# Patient Record
Sex: FEMALE | Race: BLACK OR AFRICAN AMERICAN | NOT HISPANIC OR LATINO
[De-identification: names, ages, dates, MRNs, and addresses within clinical notes are randomized per-mention and may not be internally consistent; named-entity substitution may affect disease eponyms.]

---

## 2017-08-30 ENCOUNTER — OTHER (OUTPATIENT)
Age: 77
End: 2017-08-30

## 2017-08-30 DIAGNOSIS — M25.561 PAIN IN RIGHT KNEE: ICD-10-CM

## 2017-08-30 DIAGNOSIS — M25.562 PAIN IN RIGHT KNEE: ICD-10-CM

## 2017-09-07 ENCOUNTER — APPOINTMENT (OUTPATIENT)
Dept: ORTHOPEDIC SURGERY | Facility: CLINIC | Age: 77
End: 2017-09-07
Payer: MEDICARE

## 2017-09-07 VITALS
SYSTOLIC BLOOD PRESSURE: 177 MMHG | DIASTOLIC BLOOD PRESSURE: 92 MMHG | WEIGHT: 146.5 LBS | HEART RATE: 93 BPM | HEIGHT: 61.5 IN | BODY MASS INDEX: 27.31 KG/M2

## 2017-09-07 DIAGNOSIS — Z86.39 PERSONAL HISTORY OF OTHER ENDOCRINE, NUTRITIONAL AND METABOLIC DISEASE: ICD-10-CM

## 2017-09-07 DIAGNOSIS — M17.12 UNILATERAL PRIMARY OSTEOARTHRITIS, LEFT KNEE: ICD-10-CM

## 2017-09-07 DIAGNOSIS — M17.11 UNILATERAL PRIMARY OSTEOARTHRITIS, RIGHT KNEE: ICD-10-CM

## 2017-09-07 DIAGNOSIS — Z86.79 PERSONAL HISTORY OF OTHER DISEASES OF THE CIRCULATORY SYSTEM: ICD-10-CM

## 2017-09-07 DIAGNOSIS — Z87.39 PERSONAL HISTORY OF OTHER DISEASES OF THE MUSCULOSKELETAL SYSTEM AND CONNECTIVE TISSUE: ICD-10-CM

## 2017-09-07 DIAGNOSIS — Z82.61 FAMILY HISTORY OF ARTHRITIS: ICD-10-CM

## 2017-09-07 DIAGNOSIS — Z80.8 FAMILY HISTORY OF MALIGNANT NEOPLASM OF OTHER ORGANS OR SYSTEMS: ICD-10-CM

## 2017-09-07 DIAGNOSIS — Z78.9 OTHER SPECIFIED HEALTH STATUS: ICD-10-CM

## 2017-09-07 DIAGNOSIS — Z87.09 PERSONAL HISTORY OF OTHER DISEASES OF THE RESPIRATORY SYSTEM: ICD-10-CM

## 2017-09-07 PROCEDURE — 73564 X-RAY EXAM KNEE 4 OR MORE: CPT | Mod: LT

## 2017-09-07 PROCEDURE — 99205 OFFICE O/P NEW HI 60 MIN: CPT

## 2017-09-07 RX ORDER — MECLIZINE HYDROCHLORIDE 12.5 MG/1
12.5 TABLET ORAL
Refills: 0 | Status: ACTIVE | COMMUNITY

## 2017-09-07 RX ORDER — FLUTICASONE PROPIONATE AND SALMETEROL 500; 50 UG/1; UG/1
POWDER RESPIRATORY (INHALATION)
Refills: 0 | Status: ACTIVE | COMMUNITY

## 2017-09-07 RX ORDER — FEXOFENADINE HYDROCHLORIDE 180 MG/1
180 TABLET ORAL
Refills: 0 | Status: ACTIVE | COMMUNITY

## 2017-09-07 RX ORDER — ALBUTEROL 90 MCG
AEROSOL (GRAM) INHALATION
Refills: 0 | Status: ACTIVE | COMMUNITY

## 2017-09-07 RX ORDER — MULTIVIT-MIN/FA/LYCOPEN/LUTEIN .4-300-25
TABLET ORAL
Refills: 0 | Status: ACTIVE | COMMUNITY

## 2017-09-07 RX ORDER — LOSARTAN POTASSIUM 100 MG/1
100 TABLET, FILM COATED ORAL
Refills: 0 | Status: ACTIVE | COMMUNITY

## 2017-09-17 ENCOUNTER — TRANSCRIPTION ENCOUNTER (OUTPATIENT)
Age: 77
End: 2017-09-17

## 2017-09-21 ENCOUNTER — OUTPATIENT (OUTPATIENT)
Dept: OUTPATIENT SERVICES | Facility: HOSPITAL | Age: 77
LOS: 1 days | End: 2017-09-21
Payer: COMMERCIAL

## 2017-09-21 VITALS
HEIGHT: 61.5 IN | WEIGHT: 149.91 LBS | SYSTOLIC BLOOD PRESSURE: 156 MMHG | DIASTOLIC BLOOD PRESSURE: 81 MMHG | TEMPERATURE: 98 F | HEART RATE: 71 BPM | RESPIRATION RATE: 16 BRPM

## 2017-09-21 DIAGNOSIS — I10 ESSENTIAL (PRIMARY) HYPERTENSION: ICD-10-CM

## 2017-09-21 DIAGNOSIS — M17.12 UNILATERAL PRIMARY OSTEOARTHRITIS, LEFT KNEE: ICD-10-CM

## 2017-09-21 DIAGNOSIS — Z90.89 ACQUIRED ABSENCE OF OTHER ORGANS: Chronic | ICD-10-CM

## 2017-09-21 DIAGNOSIS — Z01.818 ENCOUNTER FOR OTHER PREPROCEDURAL EXAMINATION: ICD-10-CM

## 2017-09-21 DIAGNOSIS — Z90.710 ACQUIRED ABSENCE OF BOTH CERVIX AND UTERUS: Chronic | ICD-10-CM

## 2017-09-21 DIAGNOSIS — J45.909 UNSPECIFIED ASTHMA, UNCOMPLICATED: ICD-10-CM

## 2017-09-21 DIAGNOSIS — E11.9 TYPE 2 DIABETES MELLITUS WITHOUT COMPLICATIONS: ICD-10-CM

## 2017-09-21 DIAGNOSIS — Z98.41 CATARACT EXTRACTION STATUS, RIGHT EYE: Chronic | ICD-10-CM

## 2017-09-21 LAB
ANION GAP SERPL CALC-SCNC: 15 MMOL/L — SIGNIFICANT CHANGE UP (ref 5–17)
APTT BLD: 36.5 SEC — SIGNIFICANT CHANGE UP (ref 27.5–37.4)
BASOPHILS # BLD AUTO: 0 K/UL — SIGNIFICANT CHANGE UP (ref 0–0.2)
BASOPHILS NFR BLD AUTO: 0.1 % — SIGNIFICANT CHANGE UP (ref 0–2)
BLD GP AB SCN SERPL QL: SIGNIFICANT CHANGE UP
BUN SERPL-MCNC: 8 MG/DL — SIGNIFICANT CHANGE UP (ref 8–20)
CALCIUM SERPL-MCNC: 9.9 MG/DL — SIGNIFICANT CHANGE UP (ref 8.6–10.2)
CHLORIDE SERPL-SCNC: 100 MMOL/L — SIGNIFICANT CHANGE UP (ref 98–107)
CO2 SERPL-SCNC: 26 MMOL/L — SIGNIFICANT CHANGE UP (ref 22–29)
CREAT SERPL-MCNC: 0.49 MG/DL — LOW (ref 0.5–1.3)
EOSINOPHIL # BLD AUTO: 0.3 K/UL — SIGNIFICANT CHANGE UP (ref 0–0.5)
EOSINOPHIL NFR BLD AUTO: 4.7 % — SIGNIFICANT CHANGE UP (ref 0–6)
GLUCOSE SERPL-MCNC: 80 MG/DL — SIGNIFICANT CHANGE UP (ref 70–115)
HBA1C BLD-MCNC: 5.5 % — SIGNIFICANT CHANGE UP (ref 4–5.6)
HCT VFR BLD CALC: 38.3 % — SIGNIFICANT CHANGE UP (ref 37–47)
HGB BLD-MCNC: 13.1 G/DL — SIGNIFICANT CHANGE UP (ref 12–16)
INR BLD: 0.99 RATIO — SIGNIFICANT CHANGE UP (ref 0.88–1.16)
LYMPHOCYTES # BLD AUTO: 2.3 K/UL — SIGNIFICANT CHANGE UP (ref 1–4.8)
LYMPHOCYTES # BLD AUTO: 32.3 % — SIGNIFICANT CHANGE UP (ref 20–55)
MCHC RBC-ENTMCNC: 32.2 PG — HIGH (ref 27–31)
MCHC RBC-ENTMCNC: 34.2 G/DL — SIGNIFICANT CHANGE UP (ref 32–36)
MCV RBC AUTO: 94.1 FL — SIGNIFICANT CHANGE UP (ref 81–99)
MONOCYTES # BLD AUTO: 0.6 K/UL — SIGNIFICANT CHANGE UP (ref 0–0.8)
MONOCYTES NFR BLD AUTO: 8.9 % — SIGNIFICANT CHANGE UP (ref 3–10)
MRSA PCR RESULT.: SIGNIFICANT CHANGE UP
NEUTROPHILS # BLD AUTO: 3.9 K/UL — SIGNIFICANT CHANGE UP (ref 1.8–8)
NEUTROPHILS NFR BLD AUTO: 53.9 % — SIGNIFICANT CHANGE UP (ref 37–73)
PLATELET # BLD AUTO: 390 K/UL — SIGNIFICANT CHANGE UP (ref 150–400)
POTASSIUM SERPL-MCNC: 3.6 MMOL/L — SIGNIFICANT CHANGE UP (ref 3.5–5.3)
POTASSIUM SERPL-SCNC: 3.6 MMOL/L — SIGNIFICANT CHANGE UP (ref 3.5–5.3)
PROTHROM AB SERPL-ACNC: 10.9 SEC — SIGNIFICANT CHANGE UP (ref 9.8–12.7)
RBC # BLD: 4.07 M/UL — LOW (ref 4.4–5.2)
RBC # FLD: 14.6 % — SIGNIFICANT CHANGE UP (ref 11–15.6)
S AUREUS DNA NOSE QL NAA+PROBE: SIGNIFICANT CHANGE UP
SODIUM SERPL-SCNC: 141 MMOL/L — SIGNIFICANT CHANGE UP (ref 135–145)
TYPE + AB SCN PNL BLD: SIGNIFICANT CHANGE UP
WBC # BLD: 7.2 K/UL — SIGNIFICANT CHANGE UP (ref 4.8–10.8)
WBC # FLD AUTO: 7.2 K/UL — SIGNIFICANT CHANGE UP (ref 4.8–10.8)

## 2017-09-21 PROCEDURE — 87640 STAPH A DNA AMP PROBE: CPT

## 2017-09-21 PROCEDURE — 86900 BLOOD TYPING SEROLOGIC ABO: CPT

## 2017-09-21 PROCEDURE — 83036 HEMOGLOBIN GLYCOSYLATED A1C: CPT

## 2017-09-21 PROCEDURE — 80048 BASIC METABOLIC PNL TOTAL CA: CPT

## 2017-09-21 PROCEDURE — 87641 MR-STAPH DNA AMP PROBE: CPT

## 2017-09-21 PROCEDURE — 86901 BLOOD TYPING SEROLOGIC RH(D): CPT

## 2017-09-21 PROCEDURE — 36415 COLL VENOUS BLD VENIPUNCTURE: CPT

## 2017-09-21 PROCEDURE — 85027 COMPLETE CBC AUTOMATED: CPT

## 2017-09-21 PROCEDURE — 85730 THROMBOPLASTIN TIME PARTIAL: CPT

## 2017-09-21 PROCEDURE — 86850 RBC ANTIBODY SCREEN: CPT

## 2017-09-21 PROCEDURE — 93005 ELECTROCARDIOGRAM TRACING: CPT

## 2017-09-21 PROCEDURE — 85610 PROTHROMBIN TIME: CPT

## 2017-09-21 PROCEDURE — 93010 ELECTROCARDIOGRAM REPORT: CPT

## 2017-09-21 PROCEDURE — G0463: CPT

## 2017-09-21 RX ORDER — GABAPENTIN 400 MG/1
1 CAPSULE ORAL
Qty: 0 | Refills: 0 | COMMUNITY

## 2017-09-21 RX ORDER — METFORMIN HYDROCHLORIDE 850 MG/1
1 TABLET ORAL
Qty: 0 | Refills: 0 | COMMUNITY

## 2017-09-21 RX ORDER — SODIUM CHLORIDE 9 MG/ML
3 INJECTION INTRAMUSCULAR; INTRAVENOUS; SUBCUTANEOUS EVERY 8 HOURS
Qty: 0 | Refills: 0 | Status: DISCONTINUED | OUTPATIENT
Start: 2017-10-06 | End: 2017-10-07

## 2017-09-21 NOTE — PATIENT PROFILE ADULT. - FAMILY HISTORY
Mother  Still living? No  Family history of bone cancer, Age at diagnosis: 71-80     Father  Still living? No  Family history of prostate cancer, Age at diagnosis:   Family history of dementia, Age at diagnosis: Age Unknown     Sibling  Still living? No  Family history of AIDS, Age at diagnosis: 21-30     Child  Still living? Yes, Estimated age: Age Unknown  Family history of cerebral palsy, Age at diagnosis: Age Unknown

## 2017-09-21 NOTE — H&P PST ADULT - ASSESSMENT
77F PMH HTN, Asthma, DM, Colon Polyps, Neuropathy, Fibromyalgia, GERD, Glaucoma and Anemia with left knee osteoarthritis for Left Knee Total Joint Replacement.

## 2017-09-21 NOTE — PATIENT PROFILE ADULT. - LEARNING ASSESSMENT (PATIENT) ADDITIONAL COMMENTS
Educated on pain scale and management.  Tips for safer surgery  and pre op instructions given.  Verbalized understanding.

## 2017-09-21 NOTE — H&P PST ADULT - PMH
Anemia    Asthma    Colon polyps    Diabetes mellitus    Fibromyalgia    GERD (gastroesophageal reflux disease)    Glaucoma    Hypertension    Neuropathy    Osteoarthritis

## 2017-09-21 NOTE — H&P PST ADULT - NSANTHOSAYNRD_GEN_A_CORE
No. NALLELY screening performed.  STOP BANG Legend: 0-2 = LOW Risk; 3-4 = INTERMEDIATE Risk; 5-8 = HIGH Risk

## 2017-09-21 NOTE — H&P PST ADULT - PSH
History of bilateral cataract extraction    History of hysterectomy    S/P tonsillectomy and adenoidectomy

## 2017-09-26 RX ORDER — GABAPENTIN 400 MG/1
300 CAPSULE ORAL ONCE
Qty: 0 | Refills: 0 | Status: COMPLETED | OUTPATIENT
Start: 2017-10-06 | End: 2017-10-06

## 2017-09-26 RX ORDER — OXYCODONE HYDROCHLORIDE 5 MG/1
10 TABLET ORAL ONCE
Qty: 0 | Refills: 0 | Status: DISCONTINUED | OUTPATIENT
Start: 2017-10-06 | End: 2017-10-06

## 2017-09-26 RX ORDER — CELECOXIB 200 MG/1
400 CAPSULE ORAL ONCE
Qty: 0 | Refills: 0 | Status: COMPLETED | OUTPATIENT
Start: 2017-10-06 | End: 2017-10-06

## 2017-09-26 RX ORDER — SCOPALAMINE 1 MG/3D
1.5 PATCH, EXTENDED RELEASE TRANSDERMAL ONCE
Qty: 0 | Refills: 0 | Status: COMPLETED | OUTPATIENT
Start: 2017-10-06 | End: 2017-10-06

## 2017-10-05 ENCOUNTER — FORM ENCOUNTER (OUTPATIENT)
Age: 77
End: 2017-10-05

## 2017-10-06 ENCOUNTER — RESULT REVIEW (OUTPATIENT)
Age: 77
End: 2017-10-06

## 2017-10-06 ENCOUNTER — APPOINTMENT (OUTPATIENT)
Dept: ORTHOPEDIC SURGERY | Facility: HOSPITAL | Age: 77
End: 2017-10-06

## 2017-10-06 ENCOUNTER — TRANSCRIPTION ENCOUNTER (OUTPATIENT)
Age: 77
End: 2017-10-06

## 2017-10-06 ENCOUNTER — INPATIENT (INPATIENT)
Facility: HOSPITAL | Age: 77
LOS: 0 days | Discharge: HOME | DRG: 470 | End: 2017-10-07
Attending: ORTHOPAEDIC SURGERY | Admitting: ORTHOPAEDIC SURGERY
Payer: COMMERCIAL

## 2017-10-06 VITALS
OXYGEN SATURATION: 97 % | WEIGHT: 149.91 LBS | SYSTOLIC BLOOD PRESSURE: 141 MMHG | DIASTOLIC BLOOD PRESSURE: 73 MMHG | HEART RATE: 74 BPM | HEIGHT: 61.5 IN | RESPIRATION RATE: 14 BRPM | TEMPERATURE: 98 F

## 2017-10-06 DIAGNOSIS — Z90.89 ACQUIRED ABSENCE OF OTHER ORGANS: Chronic | ICD-10-CM

## 2017-10-06 DIAGNOSIS — M17.12 UNILATERAL PRIMARY OSTEOARTHRITIS, LEFT KNEE: ICD-10-CM

## 2017-10-06 DIAGNOSIS — Z90.710 ACQUIRED ABSENCE OF BOTH CERVIX AND UTERUS: Chronic | ICD-10-CM

## 2017-10-06 DIAGNOSIS — Z98.41 CATARACT EXTRACTION STATUS, RIGHT EYE: Chronic | ICD-10-CM

## 2017-10-06 LAB — ABO RH CONFIRMATION: SIGNIFICANT CHANGE UP

## 2017-10-06 PROCEDURE — 88305 TISSUE EXAM BY PATHOLOGIST: CPT | Mod: 26

## 2017-10-06 PROCEDURE — 88311 DECALCIFY TISSUE: CPT | Mod: 26

## 2017-10-06 PROCEDURE — 73560 X-RAY EXAM OF KNEE 1 OR 2: CPT | Mod: 26,LT

## 2017-10-06 PROCEDURE — 20985 CPTR-ASST DIR MS PX: CPT

## 2017-10-06 PROCEDURE — 20985 CPTR-ASST DIR MS PX: CPT | Mod: AS

## 2017-10-06 PROCEDURE — 27447 TOTAL KNEE ARTHROPLASTY: CPT | Mod: AS,LT

## 2017-10-06 PROCEDURE — 27447 TOTAL KNEE ARTHROPLASTY: CPT | Mod: LT

## 2017-10-06 RX ORDER — THEOPHYLLINE ANHYDROUS 200 MG
300 TABLET, EXTENDED RELEASE 12 HR ORAL EVERY 12 HOURS
Qty: 0 | Refills: 0 | Status: DISCONTINUED | OUTPATIENT
Start: 2017-10-06 | End: 2017-10-07

## 2017-10-06 RX ORDER — DEXTROSE 50 % IN WATER 50 %
15 SYRINGE (ML) INTRAVENOUS ONCE
Qty: 0 | Refills: 0 | Status: COMPLETED | OUTPATIENT
Start: 2017-10-06 | End: 2017-10-06

## 2017-10-06 RX ORDER — DEXTROSE 50 % IN WATER 50 %
25 SYRINGE (ML) INTRAVENOUS ONCE
Qty: 0 | Refills: 0 | Status: DISCONTINUED | OUTPATIENT
Start: 2017-10-06 | End: 2017-10-07

## 2017-10-06 RX ORDER — CEFAZOLIN SODIUM 1 G
2000 VIAL (EA) INJECTION ONCE
Qty: 0 | Refills: 0 | Status: DISCONTINUED | OUTPATIENT
Start: 2017-10-06 | End: 2017-10-06

## 2017-10-06 RX ORDER — KETOROLAC TROMETHAMINE 30 MG/ML
15 SYRINGE (ML) INJECTION EVERY 6 HOURS
Qty: 0 | Refills: 0 | Status: DISCONTINUED | OUTPATIENT
Start: 2017-10-06 | End: 2017-10-07

## 2017-10-06 RX ORDER — SODIUM CHLORIDE 9 MG/ML
1000 INJECTION, SOLUTION INTRAVENOUS
Qty: 0 | Refills: 0 | Status: DISCONTINUED | OUTPATIENT
Start: 2017-10-06 | End: 2017-10-07

## 2017-10-06 RX ORDER — LOSARTAN POTASSIUM 100 MG/1
100 TABLET, FILM COATED ORAL DAILY
Qty: 0 | Refills: 0 | Status: DISCONTINUED | OUTPATIENT
Start: 2017-10-07 | End: 2017-10-07

## 2017-10-06 RX ORDER — LORATADINE 10 MG/1
10 TABLET ORAL DAILY
Qty: 0 | Refills: 0 | Status: DISCONTINUED | OUTPATIENT
Start: 2017-10-06 | End: 2017-10-07

## 2017-10-06 RX ORDER — OXYCODONE HYDROCHLORIDE 5 MG/1
5 TABLET ORAL
Qty: 0 | Refills: 0 | Status: DISCONTINUED | OUTPATIENT
Start: 2017-10-06 | End: 2017-10-07

## 2017-10-06 RX ORDER — TRANEXAMIC ACID 100 MG/ML
700 INJECTION, SOLUTION INTRAVENOUS ONCE
Qty: 0 | Refills: 0 | Status: DISCONTINUED | OUTPATIENT
Start: 2017-10-06 | End: 2017-10-06

## 2017-10-06 RX ORDER — DEXTROSE 50 % IN WATER 50 %
SYRINGE (ML) INTRAVENOUS
Qty: 0 | Refills: 0 | Status: DISCONTINUED | OUTPATIENT
Start: 2017-10-06 | End: 2017-10-06

## 2017-10-06 RX ORDER — HYDROMORPHONE HYDROCHLORIDE 2 MG/ML
0.5 INJECTION INTRAMUSCULAR; INTRAVENOUS; SUBCUTANEOUS EVERY 4 HOURS
Qty: 0 | Refills: 0 | Status: DISCONTINUED | OUTPATIENT
Start: 2017-10-06 | End: 2017-10-07

## 2017-10-06 RX ORDER — ACETAMINOPHEN 500 MG
1000 TABLET ORAL ONCE
Qty: 0 | Refills: 0 | Status: DISCONTINUED | OUTPATIENT
Start: 2017-10-06 | End: 2017-10-06

## 2017-10-06 RX ORDER — VANCOMYCIN HCL 1 G
1000 VIAL (EA) INTRAVENOUS ONCE
Qty: 0 | Refills: 0 | Status: COMPLETED | OUTPATIENT
Start: 2017-10-06 | End: 2017-10-06

## 2017-10-06 RX ORDER — GLUCAGON INJECTION, SOLUTION 0.5 MG/.1ML
1 INJECTION, SOLUTION SUBCUTANEOUS ONCE
Qty: 0 | Refills: 0 | Status: DISCONTINUED | OUTPATIENT
Start: 2017-10-06 | End: 2017-10-07

## 2017-10-06 RX ORDER — VANCOMYCIN HCL 1 G
1000 VIAL (EA) INTRAVENOUS
Qty: 0 | Refills: 0 | Status: COMPLETED | OUTPATIENT
Start: 2017-10-06 | End: 2017-10-06

## 2017-10-06 RX ORDER — DEXTROSE 50 % IN WATER 50 %
10 SYRINGE (ML) INTRAVENOUS ONCE
Qty: 0 | Refills: 0 | Status: DISCONTINUED | OUTPATIENT
Start: 2017-10-06 | End: 2017-10-06

## 2017-10-06 RX ORDER — SODIUM CHLORIDE 9 MG/ML
1000 INJECTION INTRAMUSCULAR; INTRAVENOUS; SUBCUTANEOUS
Qty: 0 | Refills: 0 | Status: DISCONTINUED | OUTPATIENT
Start: 2017-10-06 | End: 2017-10-06

## 2017-10-06 RX ORDER — ALBUTEROL 90 UG/1
2 AEROSOL, METERED ORAL EVERY 6 HOURS
Qty: 0 | Refills: 0 | Status: DISCONTINUED | OUTPATIENT
Start: 2017-10-06 | End: 2017-10-07

## 2017-10-06 RX ORDER — DEXTROSE 50 % IN WATER 50 %
1 SYRINGE (ML) INTRAVENOUS ONCE
Qty: 0 | Refills: 0 | Status: DISCONTINUED | OUTPATIENT
Start: 2017-10-06 | End: 2017-10-07

## 2017-10-06 RX ORDER — SENNA PLUS 8.6 MG/1
2 TABLET ORAL AT BEDTIME
Qty: 0 | Refills: 0 | Status: DISCONTINUED | OUTPATIENT
Start: 2017-10-06 | End: 2017-10-07

## 2017-10-06 RX ORDER — DOCUSATE SODIUM 100 MG
100 CAPSULE ORAL THREE TIMES A DAY
Qty: 0 | Refills: 0 | Status: DISCONTINUED | OUTPATIENT
Start: 2017-10-06 | End: 2017-10-07

## 2017-10-06 RX ORDER — DEXTROSE 50 % IN WATER 50 %
12.5 SYRINGE (ML) INTRAVENOUS ONCE
Qty: 0 | Refills: 0 | Status: DISCONTINUED | OUTPATIENT
Start: 2017-10-06 | End: 2017-10-07

## 2017-10-06 RX ORDER — MAGNESIUM HYDROXIDE 400 MG/1
30 TABLET, CHEWABLE ORAL DAILY
Qty: 0 | Refills: 0 | Status: DISCONTINUED | OUTPATIENT
Start: 2017-10-06 | End: 2017-10-07

## 2017-10-06 RX ORDER — ACETAMINOPHEN 500 MG
975 TABLET ORAL EVERY 8 HOURS
Qty: 0 | Refills: 0 | Status: DISCONTINUED | OUTPATIENT
Start: 2017-10-06 | End: 2017-10-07

## 2017-10-06 RX ORDER — ONDANSETRON 8 MG/1
4 TABLET, FILM COATED ORAL EVERY 6 HOURS
Qty: 0 | Refills: 0 | Status: DISCONTINUED | OUTPATIENT
Start: 2017-10-06 | End: 2017-10-07

## 2017-10-06 RX ORDER — MECLIZINE HCL 12.5 MG
12.5 TABLET ORAL THREE TIMES A DAY
Qty: 0 | Refills: 0 | Status: DISCONTINUED | OUTPATIENT
Start: 2017-10-06 | End: 2017-10-07

## 2017-10-06 RX ORDER — CEFAZOLIN SODIUM 1 G
2000 VIAL (EA) INJECTION
Qty: 0 | Refills: 0 | Status: COMPLETED | OUTPATIENT
Start: 2017-10-06 | End: 2017-10-07

## 2017-10-06 RX ORDER — HYDROMORPHONE HYDROCHLORIDE 2 MG/ML
0.5 INJECTION INTRAMUSCULAR; INTRAVENOUS; SUBCUTANEOUS
Qty: 0 | Refills: 0 | Status: DISCONTINUED | OUTPATIENT
Start: 2017-10-06 | End: 2017-10-06

## 2017-10-06 RX ORDER — ENOXAPARIN SODIUM 100 MG/ML
30 INJECTION SUBCUTANEOUS EVERY 12 HOURS
Qty: 0 | Refills: 0 | Status: DISCONTINUED | OUTPATIENT
Start: 2017-10-07 | End: 2017-10-07

## 2017-10-06 RX ORDER — ACETAMINOPHEN 500 MG
650 TABLET ORAL EVERY 6 HOURS
Qty: 0 | Refills: 0 | Status: DISCONTINUED | OUTPATIENT
Start: 2017-10-06 | End: 2017-10-07

## 2017-10-06 RX ORDER — INSULIN LISPRO 100/ML
VIAL (ML) SUBCUTANEOUS
Qty: 0 | Refills: 0 | Status: DISCONTINUED | OUTPATIENT
Start: 2017-10-06 | End: 2017-10-07

## 2017-10-06 RX ORDER — OXYCODONE HYDROCHLORIDE 5 MG/1
10 TABLET ORAL EVERY 12 HOURS
Qty: 0 | Refills: 0 | Status: DISCONTINUED | OUTPATIENT
Start: 2017-10-06 | End: 2017-10-07

## 2017-10-06 RX ORDER — POLYETHYLENE GLYCOL 3350 17 G/17G
17 POWDER, FOR SOLUTION ORAL DAILY
Qty: 0 | Refills: 0 | Status: DISCONTINUED | OUTPATIENT
Start: 2017-10-06 | End: 2017-10-07

## 2017-10-06 RX ORDER — OXYCODONE HYDROCHLORIDE 5 MG/1
10 TABLET ORAL
Qty: 0 | Refills: 0 | Status: DISCONTINUED | OUTPATIENT
Start: 2017-10-06 | End: 2017-10-07

## 2017-10-06 RX ORDER — BUDESONIDE AND FORMOTEROL FUMARATE DIHYDRATE 160; 4.5 UG/1; UG/1
2 AEROSOL RESPIRATORY (INHALATION)
Qty: 0 | Refills: 0 | Status: DISCONTINUED | OUTPATIENT
Start: 2017-10-06 | End: 2017-10-07

## 2017-10-06 RX ORDER — ONDANSETRON 8 MG/1
4 TABLET, FILM COATED ORAL ONCE
Qty: 0 | Refills: 0 | Status: DISCONTINUED | OUTPATIENT
Start: 2017-10-06 | End: 2017-10-06

## 2017-10-06 RX ADMIN — SODIUM CHLORIDE 100 MILLILITER(S): 9 INJECTION, SOLUTION INTRAVENOUS at 22:03

## 2017-10-06 RX ADMIN — GABAPENTIN 300 MILLIGRAM(S): 400 CAPSULE ORAL at 11:27

## 2017-10-06 RX ADMIN — SCOPALAMINE 1.5 MILLIGRAM(S): 1 PATCH, EXTENDED RELEASE TRANSDERMAL at 11:29

## 2017-10-06 RX ADMIN — OXYCODONE HYDROCHLORIDE 10 MILLIGRAM(S): 5 TABLET ORAL at 11:27

## 2017-10-06 RX ADMIN — Medication 15 MILLIGRAM(S): at 23:51

## 2017-10-06 RX ADMIN — ALBUTEROL 2 PUFF(S): 90 AEROSOL, METERED ORAL at 18:47

## 2017-10-06 RX ADMIN — Medication 250 MILLIGRAM(S): at 11:28

## 2017-10-06 RX ADMIN — Medication 250 MILLIGRAM(S): at 23:50

## 2017-10-06 RX ADMIN — BUDESONIDE AND FORMOTEROL FUMARATE DIHYDRATE 2 PUFF(S): 160; 4.5 AEROSOL RESPIRATORY (INHALATION) at 20:07

## 2017-10-06 RX ADMIN — Medication 15 MILLILITER(S): at 14:08

## 2017-10-06 RX ADMIN — Medication 100 MILLIGRAM(S): at 22:03

## 2017-10-06 RX ADMIN — CELECOXIB 400 MILLIGRAM(S): 200 CAPSULE ORAL at 11:27

## 2017-10-06 RX ADMIN — SODIUM CHLORIDE 3 MILLILITER(S): 9 INJECTION INTRAMUSCULAR; INTRAVENOUS; SUBCUTANEOUS at 22:02

## 2017-10-06 NOTE — BRIEF OPERATIVE NOTE - PROCEDURE
<<-----Click on this checkbox to enter Procedure Total knee arthroplasty  10/06/2017  Left computer assisted TKR  Active  ALL

## 2017-10-06 NOTE — DISCHARGE NOTE ADULT - CARE PLAN
Goal:	PT  Instructions for follow-up, activity and diet:	The patient will be seen in the office in 2 weeks for wound check. Tape will be removed at that time. Patient may shower after post-op day #5. The dressing is to be removed on 7. IF THE DRESSING BECOMES SOILED BEFORE THE REMOVAL DATE, CHANGE WITH A SIMILAR DRESSING. IF THE DRESSING BECOMES STAINED WITH DISCHARGE, CONTACT THE OFFICE FOR FURTHER DIRECTIONS. The patient will contact the office if the wound becomes red, has increasing pain, develops bleeding or discharge, an injury occurs, or has other concerns. The patient will continue PT consistent with total knee replacement. The patient will continue LOVENOX for 2 weeks and then begin ASPIRIN 325mg TWICE daily for 4 weeks for blood clot prevention. +++ The patient will take OXYCODONE AND TYLENOL for pain control and titrate according to prescription and patient needs. The patient will take Colace while taking oxycodone to prevent narcotic associated constipation.  Additionally, increase water intake (drink at least 8 glasses of water daily) and try adding fiber to the diet by eating fruits, vegetables and foods that are rich in grains. If constipation is experienced, contact the medical/primary care provider to discuss further treatment options. The patient is FULL weight bearing. Elevation of the lower leg is recommended to reduce swelling. Principal Discharge DX:	Primary osteoarthritis of left knee  Goal:	PT  Instructions for follow-up, activity and diet:	The patient will be seen in the office in 2 weeks for wound check. Patient may shower after post-op day #3. The dressing is to be removed on 7. IF THE DRESSING BECOMES SOILED BEFORE THE REMOVAL DATE, CHANGE WITH A SIMILAR DRESSING. IF THE DRESSING BECOMES STAINED WITH DISCHARGE, CONTACT THE OFFICE FOR FURTHER DIRECTIONS. The patient will contact the office if the wound becomes red, has increasing pain, develops bleeding or discharge, an injury occurs, or has other concerns. The patient will continue PT consistent with total knee replacement. The patient will continue LOVENOX for 4 weeks for blood clot prevention. +++ The patient will take OXYCODONE AND TYLENOL for pain control and titrate according to prescription and patient needs. The patient will take Colace while taking oxycodone to prevent narcotic associated constipation.  Additionally, increase water intake (drink at least 8 glasses of water daily) and try adding fiber to the diet by eating fruits, vegetables and foods that are rich in grains. If constipation is experienced, contact the medical/primary care provider to discuss further treatment options. The patient is FULL weight bearing. Elevation of the lower leg is recommended to reduce swelling.    ***All prescriptions have been sent to [facility] in [place] on [address]

## 2017-10-06 NOTE — DISCHARGE NOTE ADULT - HOSPITAL COURSE
The patient underwent a Left TOTAL KNEE REPLACEMENT on 10/6/17. The patient received antibiotics consistent with SCIP guidelines. The patient underwent the procedure and had no intra-operative complications. Post-operatively, the patient was seen by medicine and PT. The patient received LOVENOX for DVTP. The patient received pain medications per orthopedic pain management protocol and the pain was appropriately controlled. The patient did not have any post-operative medical complications. The patient was discharged in stable condition.

## 2017-10-06 NOTE — DISCHARGE NOTE ADULT - ADDITIONAL INSTRUCTIONS
For Constipation :   • Increase your water intake. Drink at least 8 glasses of water daily.  • Try adding fiber to your diet by eating fruits, vegetables and foods that are rich in grains.  • If you do experience constipation, you may take an over-the-counter stool softener/laxative such as Suzanne Colace, [place] or  Milk of Magnesia.

## 2017-10-06 NOTE — DISCHARGE NOTE ADULT - PLAN OF CARE
PT The patient will be seen in the office in 2 weeks for wound check. Tape will be removed at that time. Patient may shower after post-op day #5. The dressing is to be removed on 7. IF THE DRESSING BECOMES SOILED BEFORE THE REMOVAL DATE, CHANGE WITH A SIMILAR DRESSING. IF THE DRESSING BECOMES STAINED WITH DISCHARGE, CONTACT THE OFFICE FOR FURTHER DIRECTIONS. The patient will contact the office if the wound becomes red, has increasing pain, develops bleeding or discharge, an injury occurs, or has other concerns. The patient will continue PT consistent with total knee replacement. The patient will continue LOVENOX for 2 weeks and then begin ASPIRIN 325mg TWICE daily for 4 weeks for blood clot prevention. +++ The patient will take OXYCODONE AND TYLENOL for pain control and titrate according to prescription and patient needs. The patient will take Colace while taking oxycodone to prevent narcotic associated constipation.  Additionally, increase water intake (drink at least 8 glasses of water daily) and try adding fiber to the diet by eating fruits, vegetables and foods that are rich in grains. If constipation is experienced, contact the medical/primary care provider to discuss further treatment options. The patient is FULL weight bearing. Elevation of the lower leg is recommended to reduce swelling. The patient will be seen in the office in 2 weeks for wound check. Patient may shower after post-op day #3. The dressing is to be removed on 7. IF THE DRESSING BECOMES SOILED BEFORE THE REMOVAL DATE, CHANGE WITH A SIMILAR DRESSING. IF THE DRESSING BECOMES STAINED WITH DISCHARGE, CONTACT THE OFFICE FOR FURTHER DIRECTIONS. The patient will contact the office if the wound becomes red, has increasing pain, develops bleeding or discharge, an injury occurs, or has other concerns. The patient will continue PT consistent with total knee replacement. The patient will continue LOVENOX for 4 weeks for blood clot prevention. +++ The patient will take OXYCODONE AND TYLENOL for pain control and titrate according to prescription and patient needs. The patient will take Colace while taking oxycodone to prevent narcotic associated constipation.  Additionally, increase water intake (drink at least 8 glasses of water daily) and try adding fiber to the diet by eating fruits, vegetables and foods that are rich in grains. If constipation is experienced, contact the medical/primary care provider to discuss further treatment options. The patient is FULL weight bearing. Elevation of the lower leg is recommended to reduce swelling.    ***All prescriptions have been sent to [facility] in [place] on [address]

## 2017-10-06 NOTE — DISCHARGE NOTE ADULT - MEDICATION SUMMARY - MEDICATIONS TO TAKE
I will START or STAY ON the medications listed below when I get home from the hospital:    oxyCODONE 5 mg oral tablet  -- 1 tab(s) by mouth every 3 hours, As needed, Mild to moderate pain control Pain-2 MDD:eight  -- Indication: For Pain    Tylenol 325 mg oral tablet  -- 3 tab(s) by mouth every 8 hours for supplemental pain control  -- Indication: For Pain    losartan 100 mg oral tablet  -- 1 tab(s) by mouth once a day  -- Indication: For Home med    enoxaparin 30 mg/0.3 mL injectable solution  -- 30 milligram(s) injectable every 12 hours     DISP: 60 injections  -- Indication: For Clot prevention    gabapentin 300 mg oral capsule  -- 1 cap(s) by mouth once a day (at bedtime)  -- Indication: For Home med    metFORMIN 500 mg oral tablet  -- 1 tab(s) by mouth once a day (at bedtime)  -- Indication: For Home med    meclizine 12.5 mg oral tablet  -- 1 tab(s) by mouth once a day, As Needed  -- Indication: For Home med    fexofenadine 180 mg oral tablet  -- 1 tab(s) by mouth once a day, As Needed - for allergy symptoms  -- Indication: For Home med    theophylline 300 mg oral tablet, extended release  -- 1 tab(s) by mouth every 12 hours  -- Indication: For Home med    Advair Diskus 250 mcg-50 mcg inhalation powder  -- 1 puff(s) inhaled 2 times a day, As Needed - for shortness of breath and/or wheezing  -- Indication: For Home med    Proventil HFA 90 mcg/inh inhalation aerosol  -- 2 puff(s) inhaled 4 times a day, As Needed - for shortness of breath and/or wheezing  -- Indication: For Home med    docusate sodium 100 mg oral capsule  -- 1 cap(s) by mouth 3 times a day  -- Indication: For Constipation    Citrucel 2 g/19 g oral powder for reconstitution  -- 1 dose(s) by mouth once a day  -- Indication: For Home med    azelastine 137 mcg/inh (0.1%) nasal spray  -- 2 spray(s) into nose 2 times a day, As Needed  -- Indication: For Home med    Centrum Silver oral tablet  -- 1 tab(s) by mouth once a day  -- Indication: For Home med    Calcium 600+D oral tablet  -- 1 tab(s) by mouth once a day  -- Indication: For Home med    Vitamin B-12 1000 mcg oral tablet  -- 1 tab(s) by mouth once a day  -- Indication: For Home med

## 2017-10-06 NOTE — DISCHARGE NOTE ADULT - PATIENT PORTAL LINK FT
“You can access the [employer] Patient Portal, offered by [facility], by registering with the following website: [email or web address]”

## 2017-10-07 VITALS
TEMPERATURE: 99 F | OXYGEN SATURATION: 96 % | DIASTOLIC BLOOD PRESSURE: 62 MMHG | RESPIRATION RATE: 18 BRPM | HEART RATE: 71 BPM | SYSTOLIC BLOOD PRESSURE: 119 MMHG

## 2017-10-07 LAB
ANION GAP SERPL CALC-SCNC: 10 MMOL/L — SIGNIFICANT CHANGE UP (ref 5–17)
BUN SERPL-MCNC: 11 MG/DL — SIGNIFICANT CHANGE UP (ref 8–20)
CALCIUM SERPL-MCNC: 8.4 MG/DL — LOW (ref 8.6–10.2)
CHLORIDE SERPL-SCNC: 104 MMOL/L — SIGNIFICANT CHANGE UP (ref 98–107)
CO2 SERPL-SCNC: 26 MMOL/L — SIGNIFICANT CHANGE UP (ref 22–29)
CREAT SERPL-MCNC: 0.46 MG/DL — LOW (ref 0.5–1.3)
GLUCOSE SERPL-MCNC: 108 MG/DL — SIGNIFICANT CHANGE UP (ref 70–115)
HCT VFR BLD CALC: 36.2 % — LOW (ref 37–47)
HGB BLD-MCNC: 12 G/DL — SIGNIFICANT CHANGE UP (ref 12–16)
MCHC RBC-ENTMCNC: 31.6 PG — HIGH (ref 27–31)
MCHC RBC-ENTMCNC: 33.1 G/DL — SIGNIFICANT CHANGE UP (ref 32–36)
MCV RBC AUTO: 95.3 FL — SIGNIFICANT CHANGE UP (ref 81–99)
PLATELET # BLD AUTO: 267 K/UL — SIGNIFICANT CHANGE UP (ref 150–400)
POTASSIUM SERPL-MCNC: 3.8 MMOL/L — SIGNIFICANT CHANGE UP (ref 3.5–5.3)
POTASSIUM SERPL-SCNC: 3.8 MMOL/L — SIGNIFICANT CHANGE UP (ref 3.5–5.3)
RBC # BLD: 3.8 M/UL — LOW (ref 4.4–5.2)
RBC # FLD: 14.9 % — SIGNIFICANT CHANGE UP (ref 11–15.6)
SODIUM SERPL-SCNC: 140 MMOL/L — SIGNIFICANT CHANGE UP (ref 135–145)
WBC # BLD: 11 K/UL — HIGH (ref 4.8–10.8)
WBC # FLD AUTO: 11 K/UL — HIGH (ref 4.8–10.8)

## 2017-10-07 PROCEDURE — 88311 DECALCIFY TISSUE: CPT

## 2017-10-07 PROCEDURE — 99222 1ST HOSP IP/OBS MODERATE 55: CPT

## 2017-10-07 PROCEDURE — 85027 COMPLETE CBC AUTOMATED: CPT

## 2017-10-07 PROCEDURE — C1776: CPT

## 2017-10-07 PROCEDURE — 73560 X-RAY EXAM OF KNEE 1 OR 2: CPT

## 2017-10-07 PROCEDURE — 94640 AIRWAY INHALATION TREATMENT: CPT

## 2017-10-07 PROCEDURE — 97116 GAIT TRAINING THERAPY: CPT

## 2017-10-07 PROCEDURE — 88305 TISSUE EXAM BY PATHOLOGIST: CPT

## 2017-10-07 PROCEDURE — 97110 THERAPEUTIC EXERCISES: CPT

## 2017-10-07 PROCEDURE — 80048 BASIC METABOLIC PNL TOTAL CA: CPT

## 2017-10-07 PROCEDURE — 36415 COLL VENOUS BLD VENIPUNCTURE: CPT

## 2017-10-07 PROCEDURE — C1713: CPT

## 2017-10-07 PROCEDURE — 97163 PT EVAL HIGH COMPLEX 45 MIN: CPT

## 2017-10-07 RX ORDER — ACETAMINOPHEN 500 MG
3 TABLET ORAL
Qty: 0 | Refills: 0 | COMMUNITY
Start: 2017-10-07

## 2017-10-07 RX ORDER — OXYCODONE HYDROCHLORIDE 5 MG/1
1 TABLET ORAL
Qty: 50 | Refills: 0 | OUTPATIENT
Start: 2017-10-07

## 2017-10-07 RX ORDER — ACETAMINOPHEN 500 MG
2 TABLET ORAL
Qty: 0 | Refills: 0 | COMMUNITY

## 2017-10-07 RX ORDER — ENOXAPARIN SODIUM 100 MG/ML
30 INJECTION SUBCUTANEOUS
Qty: 60 | Refills: 0 | OUTPATIENT
Start: 2017-10-07 | End: 2017-12-16

## 2017-10-07 RX ORDER — DOCUSATE SODIUM 100 MG
1 CAPSULE ORAL
Qty: 30 | Refills: 0 | OUTPATIENT
Start: 2017-10-07 | End: 2017-10-17

## 2017-10-07 RX ORDER — SODIUM CHLORIDE 9 MG/ML
1000 INJECTION, SOLUTION INTRAVENOUS
Qty: 0 | Refills: 0 | Status: DISCONTINUED | OUTPATIENT
Start: 2017-10-07 | End: 2017-10-07

## 2017-10-07 RX ORDER — ENOXAPARIN SODIUM 100 MG/ML
30 INJECTION SUBCUTANEOUS
Qty: 60 | Refills: 0 | OUTPATIENT
Start: 2017-10-07 | End: 2017-11-06

## 2017-10-07 RX ADMIN — Medication 300 MILLIGRAM(S): at 05:47

## 2017-10-07 RX ADMIN — Medication 100 MILLIGRAM(S): at 05:47

## 2017-10-07 RX ADMIN — SODIUM CHLORIDE 3 MILLILITER(S): 9 INJECTION INTRAMUSCULAR; INTRAVENOUS; SUBCUTANEOUS at 13:16

## 2017-10-07 RX ADMIN — Medication 975 MILLIGRAM(S): at 13:12

## 2017-10-07 RX ADMIN — OXYCODONE HYDROCHLORIDE 10 MILLIGRAM(S): 5 TABLET ORAL at 05:50

## 2017-10-07 RX ADMIN — LOSARTAN POTASSIUM 100 MILLIGRAM(S): 100 TABLET, FILM COATED ORAL at 05:47

## 2017-10-07 RX ADMIN — Medication 15 MILLIGRAM(S): at 05:47

## 2017-10-07 RX ADMIN — POLYETHYLENE GLYCOL 3350 17 GRAM(S): 17 POWDER, FOR SOLUTION ORAL at 13:12

## 2017-10-07 RX ADMIN — Medication 15 MILLIGRAM(S): at 06:05

## 2017-10-07 RX ADMIN — SODIUM CHLORIDE 3 MILLILITER(S): 9 INJECTION INTRAMUSCULAR; INTRAVENOUS; SUBCUTANEOUS at 05:34

## 2017-10-07 RX ADMIN — LORATADINE 10 MILLIGRAM(S): 10 TABLET ORAL at 13:12

## 2017-10-07 RX ADMIN — Medication 100 MILLIGRAM(S): at 13:13

## 2017-10-07 RX ADMIN — BUDESONIDE AND FORMOTEROL FUMARATE DIHYDRATE 2 PUFF(S): 160; 4.5 AEROSOL RESPIRATORY (INHALATION) at 09:18

## 2017-10-07 RX ADMIN — OXYCODONE HYDROCHLORIDE 10 MILLIGRAM(S): 5 TABLET ORAL at 06:50

## 2017-10-07 RX ADMIN — Medication 975 MILLIGRAM(S): at 14:00

## 2017-10-07 NOTE — CONSULT NOTE ADULT - ASSESSMENT
77F PMH HTN, Asthma, DM, Colon Polyps, Neuropathy, Fibromyalgia, GERD, Glaucoma and Anemia with left knee osteoarthritis , now s/p L TKA , POD # 1     Problem/Plan - 1:  ·  Problem: Unilateral primary osteoarthritis, left knee.  Plan: s/p L TKA ,  PT/OT/pain mgmt  DVT prophylaxis- as per ortho  Abx as per SCIP  Incentive spirometry  Prophylaxis of opioid  induced constipation       Problem/Plan - 2:  ·  Problem: Diabetes mellitus - type 2 .  Plan: continue ADA diet , accu check , may restart home meds on discharge      Problem/Plan - 3:  ·  Problem: Hypertension.  Plan: continue home meds with parameters      Problem/Plan - 4:  ·  Problem: Asthma.  Plan: Stable - continue home meds 77F PMH HTN, Asthma, DM, Colon Polyps, Neuropathy, Fibromyalgia, GERD, Glaucoma and Anemia with left knee osteoarthritis , now s/p L TKA , POD # 1     Problem/Plan - 1:  ·  Problem: Unilateral primary osteoarthritis, left knee.  Plan: s/p L TKA , POD # 1  PT/OT/pain mgmt  DVT prophylaxis- as per ortho  Abx as per SCIP  Incentive spirometry  Prophylaxis of opioid  induced constipation       Problem/Plan - 2:  ·  Problem: Diabetes mellitus - type 2 .  Plan: continue ADA diet , accu check , may restart home meds on discharge      Problem/Plan - 3:  ·  Problem: Hypertension.  Plan: continue home meds with parameters      Problem/Plan - 4:  ·  Problem: Asthma.  Plan: Stable - continue home meds   Problem / Plan - -5: Mild post op leukocytosis - no signs or symptoms of infection - likely reactive     Problem / Plan - 5: Hx of chronic anemia - stable .

## 2017-10-07 NOTE — PROGRESS NOTE ADULT - SUBJECTIVE AND OBJECTIVE BOX
JAIME HODGE  [id number]    History: 77y Female is status post left total knee arthroplasty on 10/6/2017, POD # 1. Patient is doing well and is comfortable. The patient's pain is controlled using the prescribed pain medications. The patient has been out of bed to bathroom. Denies nausea, vomiting, chest pain, shortness of breath, abdominal pain or fever. No new complaints.                          12.0   11.0  )-----------( 267      ( 07 Oct 2017 05:13 )             36.2     10-07    140  |  104  |  11.0  ----------------------------<  108  3.8   |  26.0  |  0.46<L>    Ca    8.4<L>      07 Oct 2017 05:14        MEDICATIONS  (STANDING):  acetaminophen   Tablet. 975 milliGRAM(s) Oral every 8 hours  buDESOnide 160 MICROgram(s)/formoterol 4.5 MICROgram(s) Inhaler 2 Puff(s) Inhalation two times a day  dextrose 5%. 1000 milliLiter(s) (50 mL/Hr) IV Continuous <Continuous>  dextrose 50% Injectable 12.5 Gram(s) IV Push once  dextrose 50% Injectable 25 Gram(s) IV Push once  dextrose 50% Injectable 25 Gram(s) IV Push once  docusate sodium 100 milliGRAM(s) Oral three times a day  enoxaparin Injectable 30 milliGRAM(s) SubCutaneous every 12 hours  insulin lispro (HumaLOG) corrective regimen sliding scale   SubCutaneous three times a day before meals  lactated ringers. 1000 milliLiter(s) (100 mL/Hr) IV Continuous <Continuous>  loratadine 10 milliGRAM(s) Oral daily  losartan 100 milliGRAM(s) Oral daily  oxyCODONE  ER Tablet 10 milliGRAM(s) Oral every 12 hours  polyethylene glycol 3350 17 Gram(s) Oral daily  sodium chloride 0.9% lock flush 3 milliLiter(s) IV Push every 8 hours  theophylline SR Tablet 300 milliGRAM(s) Oral every 12 hours    MEDICATIONS  (PRN):  acetaminophen   Tablet 650 milliGRAM(s) Oral every 6 hours PRN For Temp over 38.3 C (100.94 F)  ALBUTerol    90 MICROgram(s) HFA Inhaler 2 Puff(s) Inhalation every 6 hours PRN for shortness of breath and/or wheezing  aluminum hydroxide/magnesium hydroxide/simethicone Suspension 30 milliLiter(s) Oral four times a day PRN Indigestion  dextrose Gel 1 Dose(s) Oral once PRN Blood Glucose LESS THAN 70 milliGRAM(s)/deciliter  glucagon  Injectable 1 milliGRAM(s) IntraMuscular once PRN Glucose LESS THAN 70 milligrams/deciliter  HYDROmorphone  Injectable 0.5 milliGRAM(s) IV Push every 4 hours PRN breakthrough  magnesium hydroxide Suspension 30 milliLiter(s) Oral daily PRN Constipation  meclizine 12.5 milliGRAM(s) Oral three times a day PRN Dizziness  ondansetron Injectable 4 milliGRAM(s) IV Push every 6 hours PRN Nausea and/or Vomiting  oxyCODONE    IR 5 milliGRAM(s) Oral every 3 hours PRN Mild Pain  oxyCODONE    IR 10 milliGRAM(s) Oral every 3 hours PRN Moderate Pain  senna 2 Tablet(s) Oral at bedtime PRN Constipation      Physical exam: The left knee dressing is clean, dry and intact. No drainage or discharge. No erythema is noted. No blistering. No ecchymosis. The calf is supple nontender. Passive range of motion is acceptable to due postoperative pain. No calf tenderness. Sensation to light touch is grossly intact distally. Motor function distally is 5/5. Extensor hallucis longus and flexor hallucis longus are intact. No foot drop. 2+ dorsalis pedis pulse. Capillary refill is less than 2 seconds. No cyanosis.    Primary Orthopedic Assessment:  • s/p LEFT total knee replacement    Secondary  Orthopedic Assessment(s):   •     Secondary  Medical Assessment(s):   Asthma: Asthma  Hypertension: Hypertension  Diabetes mellitus: Diabetes mellitus        Plan:   • DVT prophylaxis as prescribed, including use of compression devices and ankle pumps  • Continue physical therapy  • Weightbearing as tolerated of the left lower extremity with assistance of a walker  • Incentive spirometry encouraged  • Pain control as clinically indicated  • Discharge planning – anticipated discharge is Home TODAY or TOMORROW
Orthopedic PA Postop Note  Patient S/P Left TKA  Patient in bed comfortable   Left Leg  Dressing C/D/I   Pulse intact   Calf Soft NT  Dorsi/Plantar Flexion intact     Vital Signs Last 24 Hrs  T(C): 36.9 (06 Oct 2017 10:39), Max: 36.9 (06 Oct 2017 10:39)  T(F): 98.4 (06 Oct 2017 10:39), Max: 98.4 (06 Oct 2017 10:39)  HR: 44 (06 Oct 2017 17:46) (43 - 74)  BP: 128/51 (06 Oct 2017 17:46) (128/51 - 146/58)  BP(mean): --  RR: 12 (06 Oct 2017 17:46) (12 - 18)  SpO2: 100% (06 Oct 2017 17:30) (97% - 100%)

## 2017-10-13 LAB — SURGICAL PATHOLOGY FINAL REPORT - CH: SIGNIFICANT CHANGE UP

## 2017-10-16 ENCOUNTER — OTHER (OUTPATIENT)
Age: 77
End: 2017-10-16

## 2017-10-23 ENCOUNTER — APPOINTMENT (OUTPATIENT)
Dept: ORTHOPEDIC SURGERY | Facility: CLINIC | Age: 77
End: 2017-10-23
Payer: MEDICARE

## 2017-10-23 VITALS
TEMPERATURE: 98.4 F | WEIGHT: 146 LBS | DIASTOLIC BLOOD PRESSURE: 83 MMHG | SYSTOLIC BLOOD PRESSURE: 148 MMHG | BODY MASS INDEX: 27.21 KG/M2 | HEIGHT: 61.5 IN | HEART RATE: 88 BPM

## 2017-10-23 PROCEDURE — 99214 OFFICE O/P EST MOD 30 MIN: CPT | Mod: 24

## 2017-10-23 PROCEDURE — 73562 X-RAY EXAM OF KNEE 3: CPT | Mod: LT

## 2017-11-01 ENCOUNTER — OUTPATIENT (OUTPATIENT)
Dept: OUTPATIENT SERVICES | Facility: HOSPITAL | Age: 77
LOS: 1 days | End: 2017-11-01
Payer: COMMERCIAL

## 2017-11-01 VITALS
WEIGHT: 142.42 LBS | TEMPERATURE: 98 F | HEIGHT: 61 IN | SYSTOLIC BLOOD PRESSURE: 141 MMHG | RESPIRATION RATE: 16 BRPM | DIASTOLIC BLOOD PRESSURE: 77 MMHG | HEART RATE: 87 BPM

## 2017-11-01 DIAGNOSIS — Z90.710 ACQUIRED ABSENCE OF BOTH CERVIX AND UTERUS: Chronic | ICD-10-CM

## 2017-11-01 DIAGNOSIS — Z90.89 ACQUIRED ABSENCE OF OTHER ORGANS: Chronic | ICD-10-CM

## 2017-11-01 DIAGNOSIS — Z98.41 CATARACT EXTRACTION STATUS, RIGHT EYE: Chronic | ICD-10-CM

## 2017-11-01 DIAGNOSIS — Z98.890 OTHER SPECIFIED POSTPROCEDURAL STATES: Chronic | ICD-10-CM

## 2017-11-01 DIAGNOSIS — Z96.652 PRESENCE OF LEFT ARTIFICIAL KNEE JOINT: Chronic | ICD-10-CM

## 2017-11-01 DIAGNOSIS — M17.11 UNILATERAL PRIMARY OSTEOARTHRITIS, RIGHT KNEE: ICD-10-CM

## 2017-11-01 DIAGNOSIS — I10 ESSENTIAL (PRIMARY) HYPERTENSION: ICD-10-CM

## 2017-11-01 DIAGNOSIS — E11.9 TYPE 2 DIABETES MELLITUS WITHOUT COMPLICATIONS: ICD-10-CM

## 2017-11-01 DIAGNOSIS — Z01.818 ENCOUNTER FOR OTHER PREPROCEDURAL EXAMINATION: ICD-10-CM

## 2017-11-01 LAB
ANION GAP SERPL CALC-SCNC: 13 MMOL/L — SIGNIFICANT CHANGE UP (ref 5–17)
APTT BLD: 40.1 SEC — HIGH (ref 27.5–37.4)
BLD GP AB SCN SERPL QL: SIGNIFICANT CHANGE UP
BUN SERPL-MCNC: 7 MG/DL — LOW (ref 8–20)
CALCIUM SERPL-MCNC: 9.9 MG/DL — SIGNIFICANT CHANGE UP (ref 8.6–10.2)
CHLORIDE SERPL-SCNC: 104 MMOL/L — SIGNIFICANT CHANGE UP (ref 98–107)
CO2 SERPL-SCNC: 27 MMOL/L — SIGNIFICANT CHANGE UP (ref 22–29)
CREAT SERPL-MCNC: 0.47 MG/DL — LOW (ref 0.5–1.3)
GLUCOSE SERPL-MCNC: 148 MG/DL — HIGH (ref 70–115)
HBA1C BLD-MCNC: 5.4 % — SIGNIFICANT CHANGE UP (ref 4–5.6)
HCT VFR BLD CALC: 39.3 % — SIGNIFICANT CHANGE UP (ref 37–47)
HGB BLD-MCNC: 13.3 G/DL — SIGNIFICANT CHANGE UP (ref 12–16)
INR BLD: 1.03 RATIO — SIGNIFICANT CHANGE UP (ref 0.88–1.16)
MCHC RBC-ENTMCNC: 31.8 PG — HIGH (ref 27–31)
MCHC RBC-ENTMCNC: 33.8 G/DL — SIGNIFICANT CHANGE UP (ref 32–36)
MCV RBC AUTO: 94 FL — SIGNIFICANT CHANGE UP (ref 81–99)
MRSA PCR RESULT.: SIGNIFICANT CHANGE UP
PLATELET # BLD AUTO: 396 K/UL — SIGNIFICANT CHANGE UP (ref 150–400)
POTASSIUM SERPL-MCNC: 4.1 MMOL/L — SIGNIFICANT CHANGE UP (ref 3.5–5.3)
POTASSIUM SERPL-SCNC: 4.1 MMOL/L — SIGNIFICANT CHANGE UP (ref 3.5–5.3)
PROTHROM AB SERPL-ACNC: 11.3 SEC — SIGNIFICANT CHANGE UP (ref 9.8–12.7)
RBC # BLD: 4.18 M/UL — LOW (ref 4.4–5.2)
RBC # FLD: 15.6 % — SIGNIFICANT CHANGE UP (ref 11–15.6)
S AUREUS DNA NOSE QL NAA+PROBE: SIGNIFICANT CHANGE UP
SODIUM SERPL-SCNC: 144 MMOL/L — SIGNIFICANT CHANGE UP (ref 135–145)
TYPE + AB SCN PNL BLD: SIGNIFICANT CHANGE UP
WBC # BLD: 5.9 K/UL — SIGNIFICANT CHANGE UP (ref 4.8–10.8)
WBC # FLD AUTO: 5.9 K/UL — SIGNIFICANT CHANGE UP (ref 4.8–10.8)

## 2017-11-01 PROCEDURE — 86901 BLOOD TYPING SEROLOGIC RH(D): CPT

## 2017-11-01 PROCEDURE — G0463: CPT

## 2017-11-01 PROCEDURE — 86850 RBC ANTIBODY SCREEN: CPT

## 2017-11-01 PROCEDURE — 85027 COMPLETE CBC AUTOMATED: CPT

## 2017-11-01 PROCEDURE — 80048 BASIC METABOLIC PNL TOTAL CA: CPT

## 2017-11-01 PROCEDURE — 86900 BLOOD TYPING SEROLOGIC ABO: CPT

## 2017-11-01 PROCEDURE — 85610 PROTHROMBIN TIME: CPT

## 2017-11-01 PROCEDURE — 85730 THROMBOPLASTIN TIME PARTIAL: CPT

## 2017-11-01 PROCEDURE — 83036 HEMOGLOBIN GLYCOSYLATED A1C: CPT

## 2017-11-01 PROCEDURE — 87640 STAPH A DNA AMP PROBE: CPT

## 2017-11-01 PROCEDURE — 36415 COLL VENOUS BLD VENIPUNCTURE: CPT

## 2017-11-01 RX ORDER — SODIUM CHLORIDE 9 MG/ML
3 INJECTION INTRAMUSCULAR; INTRAVENOUS; SUBCUTANEOUS EVERY 8 HOURS
Qty: 0 | Refills: 0 | Status: DISCONTINUED | OUTPATIENT
Start: 2017-11-15 | End: 2017-11-16

## 2017-11-01 NOTE — H&P PST ADULT - HISTORY OF PRESENT ILLNESS
This is a 77 y.o female who presents to PST today.  The pt reports a chronic history of right knee pain.  She has undergone prior injections and PT with some improvement.  She is scheduled for a right knee replacement in the near future.

## 2017-11-01 NOTE — PATIENT PROFILE ADULT. - LEARNING ASSESSMENT (PATIENT) ADDITIONAL COMMENTS
pre surgical, surgical scrub instructions, pain management education given - verbalized understanding.

## 2017-11-01 NOTE — H&P PST ADULT - RS GEN PE MLT RESP DETAILS PC
respirations non-labored/diminished breath sounds, L/normal/airway patent/diminished breath sounds, R

## 2017-11-01 NOTE — H&P PST ADULT - FAMILY HISTORY
Mother  Still living? No  Family history of bone cancer, Age at diagnosis: 71-80     Father  Still living? No  Family history of prostate cancer, Age at diagnosis:   Family history of dementia, Age at diagnosis: Age Unknown  Family history of hypertension, Age at diagnosis: Age Unknown     Sibling  Still living? No  Family history of AIDS, Age at diagnosis: 21-30     Child  Still living? Yes, Estimated age: Age Unknown  Family history of cerebral palsy, Age at diagnosis: Age Unknown

## 2017-11-01 NOTE — H&P PST ADULT - MUSCULOSKELETAL COMMENTS
Bilateral knee pain Pt has scar to left knee secondary to recent left knee replacement October 6, 2017

## 2017-11-01 NOTE — H&P PST ADULT - PSH
History of bilateral cataract extraction    History of carpal tunnel surgery of left wrist    History of hysterectomy    History of left knee replacement    S/P tonsillectomy and adenoidectomy

## 2017-11-02 ENCOUNTER — OTHER (OUTPATIENT)
Age: 77
End: 2017-11-02

## 2017-11-06 ENCOUNTER — OTHER (OUTPATIENT)
Age: 77
End: 2017-11-06

## 2017-11-07 RX ORDER — OXYCODONE HYDROCHLORIDE 5 MG/1
10 TABLET ORAL ONCE
Qty: 0 | Refills: 0 | Status: DISCONTINUED | OUTPATIENT
Start: 2017-11-15 | End: 2017-11-15

## 2017-11-07 RX ORDER — GABAPENTIN 400 MG/1
300 CAPSULE ORAL ONCE
Qty: 0 | Refills: 0 | Status: COMPLETED | OUTPATIENT
Start: 2017-11-15 | End: 2017-11-15

## 2017-11-07 RX ORDER — CELECOXIB 200 MG/1
400 CAPSULE ORAL ONCE
Qty: 0 | Refills: 0 | Status: COMPLETED | OUTPATIENT
Start: 2017-11-15 | End: 2017-11-15

## 2017-11-07 RX ORDER — SCOPALAMINE 1 MG/3D
1.5 PATCH, EXTENDED RELEASE TRANSDERMAL ONCE
Qty: 0 | Refills: 0 | Status: COMPLETED | OUTPATIENT
Start: 2017-11-15 | End: 2017-11-15

## 2017-11-13 ENCOUNTER — APPOINTMENT (OUTPATIENT)
Dept: ORTHOPEDIC SURGERY | Facility: CLINIC | Age: 77
End: 2017-11-13
Payer: MEDICARE

## 2017-11-13 VITALS
HEIGHT: 61.5 IN | SYSTOLIC BLOOD PRESSURE: 177 MMHG | HEART RATE: 73 BPM | DIASTOLIC BLOOD PRESSURE: 83 MMHG | BODY MASS INDEX: 27.21 KG/M2 | WEIGHT: 146 LBS

## 2017-11-13 DIAGNOSIS — Z96.652 AFTERCARE FOLLOWING JOINT REPLACEMENT SURGERY: ICD-10-CM

## 2017-11-13 DIAGNOSIS — Z47.1 AFTERCARE FOLLOWING JOINT REPLACEMENT SURGERY: ICD-10-CM

## 2017-11-13 PROCEDURE — 99024 POSTOP FOLLOW-UP VISIT: CPT

## 2017-11-13 PROCEDURE — 73562 X-RAY EXAM OF KNEE 3: CPT | Mod: LT

## 2017-11-13 RX ORDER — DOCUSATE SODIUM 100 MG/1
100 CAPSULE, LIQUID FILLED ORAL
Qty: 30 | Refills: 0 | Status: DISCONTINUED | COMMUNITY
Start: 2017-10-07 | End: 2017-11-13

## 2017-11-13 RX ORDER — ENOXAPARIN SODIUM 100 MG/ML
30 INJECTION SUBCUTANEOUS
Qty: 60 | Refills: 0 | Status: DISCONTINUED | COMMUNITY
Start: 2017-10-07 | End: 2017-11-13

## 2017-11-13 RX ORDER — OXYCODONE 5 MG/1
5 TABLET ORAL
Qty: 50 | Refills: 0 | Status: DISCONTINUED | COMMUNITY
Start: 2017-10-07 | End: 2017-11-13

## 2017-11-15 ENCOUNTER — TRANSCRIPTION ENCOUNTER (OUTPATIENT)
Age: 77
End: 2017-11-15

## 2017-11-15 ENCOUNTER — RESULT REVIEW (OUTPATIENT)
Age: 77
End: 2017-11-15

## 2017-11-15 ENCOUNTER — INPATIENT (INPATIENT)
Facility: HOSPITAL | Age: 77
LOS: 0 days | Discharge: HOME | DRG: 470 | End: 2017-11-16
Attending: ORTHOPAEDIC SURGERY | Admitting: ORTHOPAEDIC SURGERY
Payer: COMMERCIAL

## 2017-11-15 ENCOUNTER — APPOINTMENT (OUTPATIENT)
Dept: ORTHOPEDIC SURGERY | Facility: HOSPITAL | Age: 77
End: 2017-11-15

## 2017-11-15 VITALS
HEIGHT: 61 IN | TEMPERATURE: 98 F | DIASTOLIC BLOOD PRESSURE: 65 MMHG | RESPIRATION RATE: 16 BRPM | OXYGEN SATURATION: 98 % | WEIGHT: 142.42 LBS | SYSTOLIC BLOOD PRESSURE: 169 MMHG | HEART RATE: 65 BPM

## 2017-11-15 DIAGNOSIS — Z98.890 OTHER SPECIFIED POSTPROCEDURAL STATES: Chronic | ICD-10-CM

## 2017-11-15 DIAGNOSIS — Z98.41 CATARACT EXTRACTION STATUS, RIGHT EYE: Chronic | ICD-10-CM

## 2017-11-15 DIAGNOSIS — Z90.89 ACQUIRED ABSENCE OF OTHER ORGANS: Chronic | ICD-10-CM

## 2017-11-15 DIAGNOSIS — Z96.652 PRESENCE OF LEFT ARTIFICIAL KNEE JOINT: Chronic | ICD-10-CM

## 2017-11-15 DIAGNOSIS — Z90.710 ACQUIRED ABSENCE OF BOTH CERVIX AND UTERUS: Chronic | ICD-10-CM

## 2017-11-15 DIAGNOSIS — M17.11 UNILATERAL PRIMARY OSTEOARTHRITIS, RIGHT KNEE: ICD-10-CM

## 2017-11-15 LAB
APTT BLD: 35.8 SEC — SIGNIFICANT CHANGE UP (ref 27.5–37.4)
BLD GP AB SCN SERPL QL: SIGNIFICANT CHANGE UP
GLUCOSE BLDC GLUCOMTR-MCNC: 120 MG/DL — HIGH (ref 70–99)
GLUCOSE BLDC GLUCOMTR-MCNC: 126 MG/DL — HIGH (ref 70–99)
GLUCOSE BLDC GLUCOMTR-MCNC: 140 MG/DL — HIGH (ref 70–99)
GLUCOSE BLDC GLUCOMTR-MCNC: 87 MG/DL — SIGNIFICANT CHANGE UP (ref 70–99)
INR BLD: 1 RATIO — SIGNIFICANT CHANGE UP (ref 0.88–1.16)
PROTHROM AB SERPL-ACNC: 11 SEC — SIGNIFICANT CHANGE UP (ref 9.8–12.7)
TYPE + AB SCN PNL BLD: SIGNIFICANT CHANGE UP

## 2017-11-15 PROCEDURE — 20985 CPTR-ASST DIR MS PX: CPT

## 2017-11-15 PROCEDURE — 99222 1ST HOSP IP/OBS MODERATE 55: CPT

## 2017-11-15 PROCEDURE — 27447 TOTAL KNEE ARTHROPLASTY: CPT | Mod: AS,79,RT

## 2017-11-15 PROCEDURE — 20985 CPTR-ASST DIR MS PX: CPT | Mod: AS

## 2017-11-15 PROCEDURE — 73560 X-RAY EXAM OF KNEE 1 OR 2: CPT | Mod: 26,RT

## 2017-11-15 PROCEDURE — 27447 TOTAL KNEE ARTHROPLASTY: CPT | Mod: 79,RT

## 2017-11-15 PROCEDURE — 88311 DECALCIFY TISSUE: CPT | Mod: 26

## 2017-11-15 PROCEDURE — 88305 TISSUE EXAM BY PATHOLOGIST: CPT | Mod: 26

## 2017-11-15 RX ORDER — ENOXAPARIN SODIUM 100 MG/ML
30 INJECTION SUBCUTANEOUS EVERY 12 HOURS
Qty: 0 | Refills: 0 | Status: DISCONTINUED | OUTPATIENT
Start: 2017-11-16 | End: 2017-11-16

## 2017-11-15 RX ORDER — ACETAMINOPHEN 500 MG
1000 TABLET ORAL ONCE
Qty: 0 | Refills: 0 | Status: DISCONTINUED | OUTPATIENT
Start: 2017-11-15 | End: 2017-11-15

## 2017-11-15 RX ORDER — ONDANSETRON 8 MG/1
4 TABLET, FILM COATED ORAL EVERY 6 HOURS
Qty: 0 | Refills: 0 | Status: DISCONTINUED | OUTPATIENT
Start: 2017-11-15 | End: 2017-11-16

## 2017-11-15 RX ORDER — DEXTROSE 50 % IN WATER 50 %
25 SYRINGE (ML) INTRAVENOUS ONCE
Qty: 0 | Refills: 0 | Status: DISCONTINUED | OUTPATIENT
Start: 2017-11-15 | End: 2017-11-16

## 2017-11-15 RX ORDER — DOCUSATE SODIUM 100 MG
100 CAPSULE ORAL THREE TIMES A DAY
Qty: 0 | Refills: 0 | Status: DISCONTINUED | OUTPATIENT
Start: 2017-11-15 | End: 2017-11-16

## 2017-11-15 RX ORDER — SODIUM CHLORIDE 9 MG/ML
1000 INJECTION, SOLUTION INTRAVENOUS
Qty: 0 | Refills: 0 | Status: DISCONTINUED | OUTPATIENT
Start: 2017-11-15 | End: 2017-11-15

## 2017-11-15 RX ORDER — INSULIN LISPRO 100/ML
VIAL (ML) SUBCUTANEOUS
Qty: 0 | Refills: 0 | Status: DISCONTINUED | OUTPATIENT
Start: 2017-11-15 | End: 2017-11-16

## 2017-11-15 RX ORDER — FENTANYL CITRATE 50 UG/ML
50 INJECTION INTRAVENOUS
Qty: 0 | Refills: 0 | Status: DISCONTINUED | OUTPATIENT
Start: 2017-11-15 | End: 2017-11-15

## 2017-11-15 RX ORDER — CEFAZOLIN SODIUM 1 G
2000 VIAL (EA) INJECTION
Qty: 0 | Refills: 0 | Status: COMPLETED | OUTPATIENT
Start: 2017-11-15 | End: 2017-11-15

## 2017-11-15 RX ORDER — TRANEXAMIC ACID 100 MG/ML
650 INJECTION, SOLUTION INTRAVENOUS ONCE
Qty: 0 | Refills: 0 | Status: DISCONTINUED | OUTPATIENT
Start: 2017-11-15 | End: 2017-11-15

## 2017-11-15 RX ORDER — ONDANSETRON 8 MG/1
4 TABLET, FILM COATED ORAL ONCE
Qty: 0 | Refills: 0 | Status: DISCONTINUED | OUTPATIENT
Start: 2017-11-15 | End: 2017-11-15

## 2017-11-15 RX ORDER — GLUCAGON INJECTION, SOLUTION 0.5 MG/.1ML
1 INJECTION, SOLUTION SUBCUTANEOUS ONCE
Qty: 0 | Refills: 0 | Status: DISCONTINUED | OUTPATIENT
Start: 2017-11-15 | End: 2017-11-16

## 2017-11-15 RX ORDER — VANCOMYCIN HCL 1 G
1000 VIAL (EA) INTRAVENOUS
Qty: 0 | Refills: 0 | Status: COMPLETED | OUTPATIENT
Start: 2017-11-15 | End: 2017-11-15

## 2017-11-15 RX ORDER — DEXTROSE 50 % IN WATER 50 %
1 SYRINGE (ML) INTRAVENOUS ONCE
Qty: 0 | Refills: 0 | Status: DISCONTINUED | OUTPATIENT
Start: 2017-11-15 | End: 2017-11-16

## 2017-11-15 RX ORDER — DEXTROSE 50 % IN WATER 50 %
12.5 SYRINGE (ML) INTRAVENOUS ONCE
Qty: 0 | Refills: 0 | Status: DISCONTINUED | OUTPATIENT
Start: 2017-11-15 | End: 2017-11-16

## 2017-11-15 RX ORDER — ACETAMINOPHEN 500 MG
1000 TABLET ORAL
Qty: 0 | Refills: 0 | Status: COMPLETED | OUTPATIENT
Start: 2017-11-15 | End: 2017-11-15

## 2017-11-15 RX ORDER — OXYCODONE HYDROCHLORIDE 5 MG/1
5 TABLET ORAL
Qty: 0 | Refills: 0 | Status: DISCONTINUED | OUTPATIENT
Start: 2017-11-15 | End: 2017-11-16

## 2017-11-15 RX ORDER — SODIUM CHLORIDE 9 MG/ML
1000 INJECTION, SOLUTION INTRAVENOUS
Qty: 0 | Refills: 0 | Status: DISCONTINUED | OUTPATIENT
Start: 2017-11-15 | End: 2017-11-16

## 2017-11-15 RX ORDER — MAGNESIUM HYDROXIDE 400 MG/1
30 TABLET, CHEWABLE ORAL DAILY
Qty: 0 | Refills: 0 | Status: DISCONTINUED | OUTPATIENT
Start: 2017-11-15 | End: 2017-11-16

## 2017-11-15 RX ORDER — LOSARTAN POTASSIUM 100 MG/1
100 TABLET, FILM COATED ORAL DAILY
Qty: 0 | Refills: 0 | Status: DISCONTINUED | OUTPATIENT
Start: 2017-11-15 | End: 2017-11-16

## 2017-11-15 RX ORDER — BUDESONIDE AND FORMOTEROL FUMARATE DIHYDRATE 160; 4.5 UG/1; UG/1
2 AEROSOL RESPIRATORY (INHALATION)
Qty: 0 | Refills: 0 | Status: DISCONTINUED | OUTPATIENT
Start: 2017-11-15 | End: 2017-11-16

## 2017-11-15 RX ORDER — GABAPENTIN 400 MG/1
300 CAPSULE ORAL AT BEDTIME
Qty: 0 | Refills: 0 | Status: DISCONTINUED | OUTPATIENT
Start: 2017-11-15 | End: 2017-11-16

## 2017-11-15 RX ORDER — ALBUTEROL 90 UG/1
2 AEROSOL, METERED ORAL EVERY 6 HOURS
Qty: 0 | Refills: 0 | Status: DISCONTINUED | OUTPATIENT
Start: 2017-11-15 | End: 2017-11-16

## 2017-11-15 RX ORDER — ACETAMINOPHEN 500 MG
975 TABLET ORAL EVERY 8 HOURS
Qty: 0 | Refills: 0 | Status: DISCONTINUED | OUTPATIENT
Start: 2017-11-16 | End: 2017-11-16

## 2017-11-15 RX ORDER — CEFAZOLIN SODIUM 1 G
2000 VIAL (EA) INJECTION ONCE
Qty: 0 | Refills: 0 | Status: DISCONTINUED | OUTPATIENT
Start: 2017-11-15 | End: 2017-11-15

## 2017-11-15 RX ORDER — SENNA PLUS 8.6 MG/1
2 TABLET ORAL AT BEDTIME
Qty: 0 | Refills: 0 | Status: DISCONTINUED | OUTPATIENT
Start: 2017-11-15 | End: 2017-11-16

## 2017-11-15 RX ORDER — VANCOMYCIN HCL 1 G
1000 VIAL (EA) INTRAVENOUS ONCE
Qty: 0 | Refills: 0 | Status: COMPLETED | OUTPATIENT
Start: 2017-11-15 | End: 2017-11-15

## 2017-11-15 RX ORDER — KETOROLAC TROMETHAMINE 30 MG/ML
15 SYRINGE (ML) INJECTION EVERY 6 HOURS
Qty: 0 | Refills: 0 | Status: DISCONTINUED | OUTPATIENT
Start: 2017-11-15 | End: 2017-11-16

## 2017-11-15 RX ORDER — OXYCODONE HYDROCHLORIDE 5 MG/1
10 TABLET ORAL
Qty: 0 | Refills: 0 | Status: DISCONTINUED | OUTPATIENT
Start: 2017-11-15 | End: 2017-11-16

## 2017-11-15 RX ADMIN — Medication 100 MILLIGRAM(S): at 20:51

## 2017-11-15 RX ADMIN — CELECOXIB 400 MILLIGRAM(S): 200 CAPSULE ORAL at 07:40

## 2017-11-15 RX ADMIN — Medication 400 MILLIGRAM(S): at 17:23

## 2017-11-15 RX ADMIN — OXYCODONE HYDROCHLORIDE 10 MILLIGRAM(S): 5 TABLET ORAL at 17:23

## 2017-11-15 RX ADMIN — Medication 250 MILLIGRAM(S): at 07:56

## 2017-11-15 RX ADMIN — OXYCODONE HYDROCHLORIDE 10 MILLIGRAM(S): 5 TABLET ORAL at 17:45

## 2017-11-15 RX ADMIN — FENTANYL CITRATE 50 MICROGRAM(S): 50 INJECTION INTRAVENOUS at 13:47

## 2017-11-15 RX ADMIN — Medication 2: at 17:25

## 2017-11-15 RX ADMIN — FENTANYL CITRATE 50 MICROGRAM(S): 50 INJECTION INTRAVENOUS at 13:50

## 2017-11-15 RX ADMIN — GABAPENTIN 300 MILLIGRAM(S): 400 CAPSULE ORAL at 21:42

## 2017-11-15 RX ADMIN — OXYCODONE HYDROCHLORIDE 10 MILLIGRAM(S): 5 TABLET ORAL at 07:41

## 2017-11-15 RX ADMIN — Medication 100 MILLIGRAM(S): at 21:42

## 2017-11-15 RX ADMIN — SCOPALAMINE 1.5 MILLIGRAM(S): 1 PATCH, EXTENDED RELEASE TRANSDERMAL at 07:41

## 2017-11-15 RX ADMIN — OXYCODONE HYDROCHLORIDE 10 MILLIGRAM(S): 5 TABLET ORAL at 14:59

## 2017-11-15 RX ADMIN — GABAPENTIN 300 MILLIGRAM(S): 400 CAPSULE ORAL at 07:41

## 2017-11-15 RX ADMIN — SODIUM CHLORIDE 3 MILLILITER(S): 9 INJECTION INTRAMUSCULAR; INTRAVENOUS; SUBCUTANEOUS at 21:43

## 2017-11-15 RX ADMIN — Medication 250 MILLIGRAM(S): at 20:04

## 2017-11-15 RX ADMIN — Medication 1000 MILLIGRAM(S): at 17:45

## 2017-11-15 NOTE — BRIEF OPERATIVE NOTE - PROCEDURE
<<-----Click on this checkbox to enter Procedure Total knee arthroplasty  11/15/2017  Right computer assisted TKR  Active  ALL

## 2017-11-15 NOTE — PHYSICAL THERAPY INITIAL EVALUATION ADULT - ADDITIONAL COMMENTS
per pt, she ambulates with the use of a RW and owns DME from a previous TKA. pt has ~4 steps to enter with a single hand rail and 12 to the bedroom. Her  is home to help as needed.

## 2017-11-15 NOTE — PHYSICAL THERAPY INITIAL EVALUATION ADULT - PERTINENT HX OF CURRENT PROBLEM, REHAB EVAL
Pt with recent left TKA presents to [facility] with reports of worsening right knee pain and difficulty ambulating

## 2017-11-15 NOTE — CONSULT NOTE ADULT - ASSESSMENT
77F PMH HTN, Asthma, DM, Colon Polyps, Neuropathy, Fibromyalgia, GERD, Glaucoma, prior left knee replacement 10/17,  and Anemia with right knee osteoarthritis , now s/p R TKA , POD # 0     Problem/Plan - 1:  ·  Problem: Unilateral primary osteoarthritis, right knee.  Plan: s/p R TKA , POD # 0  PT/OT/pain mgmt  DVT prophylaxis- as per ortho  Abx as per SCIP  Incentive spirometry  Prophylaxis of opioid  induced constipation       Problem/Plan - 2:  ·  Problem: Diabetes mellitus - type 2 .  Plan: continue ADA diet , accu check , may restart home meds on discharge      Problem/Plan - 3:  ·  Problem: Hypertension.  Plan: continue home meds with parameters      Problem/Plan - 4:  ·  Problem: Asthma.  Plan: Stable - continue home meds     Problem / Plan - -6:  Problem: Dizziness. Plan: Upon sitting post op with PT. Continue IV fluids,  if re occurs check orthostatic BP    Problem / Plan - 7: Hx of chronic anemia - monitor labs

## 2017-11-15 NOTE — CONSULT NOTE ADULT - SUBJECTIVE AND OBJECTIVE BOX
PMD: Pierce  CC: Right knee pain    History of Present Illness	  77F PMH HTN, Asthma, DM, Colon Polyps, Neuropathy, Fibromyalgia, GERD, Glaucoma , chronic  Anemia ,  right  knee osteoarthritis , now s/p right TKA , POD # 0    PAST MEDICAL & SURGICAL HISTORY:  Neuropathy  Fibromyalgia  GERD (gastroesophageal reflux disease)  Glaucoma  Asthma  Colon polyps  Anemia  Hypertension  Osteoarthritis  Diabetes mellitus  History of carpal tunnel surgery of left wrist  History of left knee replacement  History of bilateral cataract extraction  S/P tonsillectomy and adenoidectomy  History of hysterectomy    Home Medications:  · 	Tylenol 325 mg oral tablet 3 tab(s) orally every 8 hours for supplemental pain control  · 	oxyCODONE 5 mg oral tablet  , 1 tab(s) orally every 3 hours, As needed, Mild to moderate pain control Pain-2 MDD:eight  · 	docusate sodium 100 mg oral capsule  1 cap(s) orally 3 times a day  · 	meclizine 12.5 mg oral tablet  , 1 tab(s) orally once a day, As Needed  · 	losartan 100 mg oral tablet 1 tab(s) orally once a day  · 	theophylline 300 mg oral tablet, extended release , 1 tab(s) orally every 12 hours  · 	Advair Diskus 250 mcg-50 mcg inhalation powder 1 puff(s) inhaled 2 times a day, As Needed - for shortness of breath and/or wheezing  · 	Proventil HFA 90 mcg/inh inhalation aerosol 2 puff(s) inhaled 4 times a day, As Needed - for shortness of breath and/or wheezing  · 	fexofenadine 180 mg oral tablet, 1 tab(s) orally once a day, As Needed - for allergy symptoms  · 	azelastine 137 mcg/inh (0.1%) nasal spray 2 spray(s) nasal 2 times a day, As Needed  · 	Centrum Silver oral tablet 1 tab(s) orally once a day  · 	Vitamin B-12 1000 mcg oral tablet  , 1 tab(s) orally once a day  · 	Calcium 600+D oral tablet , 1 tab(s) orally once a day  · 	Citrucel 2 g/19 g oral powder for reconstitution , 1 dose(s) orally once a day  · 	gabapentin 300 mg oral capsule 1 cap(s) orally once a day (at bedtime)  · 	metFORMIN 500 mg oral tablet        MEDICATIONS  (STANDING):  lactated ringers. 1000 milliLiter(s) (125 mL/Hr) IV Continuous <Continuous>    MEDICATIONS  (PRN):  fentaNYL    Injectable 50 MICROGram(s) IV Push every 5 minutes PRN Severe Pain  ondansetron Injectable 4 milliGRAM(s) IV Push once PRN Nausea and/or Vomiting      Allergies    morphine (Unknown)    Intolerances        SOCIAL HISTORY:    former smoker  denies etoh/IVDA    FAMILY HISTORY:  Family history of hypertension (Father)  Family history of cerebral palsy (Child)  Family history of AIDS (Sibling)  Family history of dementia (Father)  Family history of prostate cancer (Father)  Family history of bone cancer (Mother)      Vital Signs Last 24 Hrs  T(C): 36.7 (15 Nov 2017 11:00), Max: 36.7 (15 Nov 2017 11:00)  T(F): 98.1 (15 Nov 2017 11:00), Max: 98.1 (15 Nov 2017 11:00)  HR: 61 (15 Nov 2017 12:15) (58 - 69)  BP: 122/51 (15 Nov 2017 12:15) (121/72 - 169/65)  BP(mean): --  RR: 19 (15 Nov 2017 12:15) (15 - 21)  SpO2: 96% (15 Nov 2017 12:15) (96% - 100%)    LABS:          PT/INR - ( 15 Nov 2017 08:20 )   PT: 11.0 sec;   INR: 1.00 ratio         PTT - ( 15 Nov 2017 08:20 )  PTT:35.8 sec        RADIOLOGY & ADDITIONAL STUDIES:   EXAM:  KNEE-RIGHT                          PROCEDURE DATE:  11/15/2017          INTERPRETATION:  Right knee    Indication: Status post arthroplasty, assess positioning    Comparison: 10/6    IMPRESSION: 2 portable views demonstrate baseline exam status post total   knee arthroplasty and patellar resurfacing in good alignment.                ADY SALEEM M.D., ATTENDING RADIOLOGIST  This document has been electronically signed. Nov 15 2017 11:28AM PMD: Pierce  CC: Right knee pain    History of Present Illness	  77F PMH HTN, Asthma, DM, Colon Polyps, Neuropathy, Fibromyalgia, GERD, Glaucoma , chronic  Anemia , prior left knee replacement,   right  knee osteoarthritis , now s/p right TKA , POD # 0    PAST MEDICAL & SURGICAL HISTORY:  Neuropathy  Fibromyalgia  GERD (gastroesophageal reflux disease)  Glaucoma  Asthma  Colon polyps  Anemia  Hypertension  Osteoarthritis  Diabetes mellitus  History of carpal tunnel surgery of left wrist  History of left knee replacement  History of bilateral cataract extraction  S/P tonsillectomy and adenoidectomy  History of hysterectomy    Home Medications:  · 	Tylenol 325 mg oral tablet 3 tab(s) orally every 8 hours for supplemental pain control  · 	oxyCODONE 5 mg oral tablet  , 1 tab(s) orally every 3 hours, As needed, Mild to moderate pain control Pain-2 MDD:eight  · 	docusate sodium 100 mg oral capsule  1 cap(s) orally 3 times a day  · 	meclizine 12.5 mg oral tablet  , 1 tab(s) orally once a day, As Needed  · 	losartan 100 mg oral tablet 1 tab(s) orally once a day  · 	theophylline 300 mg oral tablet, extended release , 1 tab(s) orally every 12 hours  · 	Advair Diskus 250 mcg-50 mcg inhalation powder 1 puff(s) inhaled 2 times a day, As Needed - for shortness of breath and/or wheezing  · 	Proventil HFA 90 mcg/inh inhalation aerosol 2 puff(s) inhaled 4 times a day, As Needed - for shortness of breath and/or wheezing  · 	fexofenadine 180 mg oral tablet, 1 tab(s) orally once a day, As Needed - for allergy symptoms  · 	azelastine 137 mcg/inh (0.1%) nasal spray 2 spray(s) nasal 2 times a day, As Needed  · 	Centrum Silver oral tablet 1 tab(s) orally once a day  · 	Vitamin B-12 1000 mcg oral tablet  , 1 tab(s) orally once a day  · 	Calcium 600+D oral tablet , 1 tab(s) orally once a day  · 	Citrucel 2 g/19 g oral powder for reconstitution , 1 dose(s) orally once a day  · 	gabapentin 300 mg oral capsule 1 cap(s) orally once a day (at bedtime)  · 	metFORMIN 500 mg oral tablet        MEDICATIONS  (STANDING):  lactated ringers. 1000 milliLiter(s) (125 mL/Hr) IV Continuous <Continuous>    MEDICATIONS  (PRN):  fentaNYL    Injectable 50 MICROGram(s) IV Push every 5 minutes PRN Severe Pain  ondansetron Injectable 4 milliGRAM(s) IV Push once PRN Nausea and/or Vomiting      Allergies    morphine (Unknown)    Intolerances        SOCIAL HISTORY:    former smoker  denies etoh/IVDA    FAMILY HISTORY:  Family history of hypertension (Father)  Family history of cerebral palsy (Child)  Family history of AIDS (Sibling)  Family history of dementia (Father)  Family history of prostate cancer (Father)  Family history of bone cancer (Mother)    ROS: +dizziness with sitting, right knee pain  Constitutional, Eyes, ENT, Cardiovascular, Respiratory,  Gastrointestinal, Genitourinary, Neurological,  Integumentary, Psychiatric, Endocrine, Heme/Lymph are otherwise negative.    Vital Signs Last 24 Hrs  T(C): 36.7 (15 Nov 2017 11:00), Max: 36.7 (15 Nov 2017 11:00)  T(F): 98.1 (15 Nov 2017 11:00), Max: 98.1 (15 Nov 2017 11:00)  HR: 61 (15 Nov 2017 12:15) (58 - 69)  BP: 122/51 (15 Nov 2017 12:15) (121/72 - 169/65)  BP(mean): --  RR: 19 (15 Nov 2017 12:15) (15 - 21)  SpO2: 96% (15 Nov 2017 12:15) (96% - 100%)    LABS:          PT/INR - ( 15 Nov 2017 08:20 )   PT: 11.0 sec;   INR: 1.00 ratio         PTT - ( 15 Nov 2017 08:20 )  PTT:35.8 sec        RADIOLOGY & ADDITIONAL STUDIES:   EXAM:  KNEE-RIGHT                          PROCEDURE DATE:  11/15/2017          INTERPRETATION:  Right knee    Indication: Status post arthroplasty, assess positioning    Comparison: 10/6    IMPRESSION: 2 portable views demonstrate baseline exam status post total   knee arthroplasty and patellar resurfacing in good alignment.                ADY SALEEM M.D., ATTENDING RADIOLOGIST  This document has been electronically signed. Nov 15 2017 11:28AM        PHYSICAL EXAM:    General: Well developed; well nourished; in no acute distress  Respiratory: No wheezes, rales or rhonchi  Cardiovascular: Regular rate and rhythm. S1 and S2 Normal; No murmurs, gallops or rubs  Gastrointestinal: Soft non-tender non-distended; Normal bowel sounds; No hepatosplenomegaly  Extremities: Right knee ACE wrap C/D/I  Vascular: Peripheral pulses palpable 2+ bilaterally  Neurological: Alert and oriented x4, no focal deficits  Skin: Warm and dry. No acute rash  Psychiatric: Cooperative and appropriate PMD: Pierce  CC: Right knee pain    History of Present Illness	  77F PMH HTN, Asthma, DM, Colon Polyps, Neuropathy, Fibromyalgia, GERD, Glaucoma , chronic  Anemia , prior left knee replacement,   right  knee osteoarthritis for many yrs , had 2-3 cortisone inj , was taking Tylenol for pain , lately pain was getting worst  , now s/p right TKA , POD # 0    PAST MEDICAL & SURGICAL HISTORY:  Neuropathy  Fibromyalgia  GERD (gastroesophageal reflux disease)  Glaucoma  Asthma  Colon polyps  Anemia  Hypertension  Osteoarthritis  Diabetes mellitus  History of carpal tunnel surgery of left wrist  History of left knee replacement  History of bilateral cataract extraction  S/P tonsillectomy and adenoidectomy  History of hysterectomy    Home Medications:  · 	Tylenol 325 mg oral tablet 3 tab(s) orally every 8 hours for supplemental pain control  · 	oxyCODONE 5 mg oral tablet  , 1 tab(s) orally every 3 hours, As needed, Mild to moderate pain control Pain-2 MDD:eight  · 	docusate sodium 100 mg oral capsule  1 cap(s) orally 3 times a day  · 	meclizine 12.5 mg oral tablet  , 1 tab(s) orally once a day, As Needed  · 	losartan 100 mg oral tablet 1 tab(s) orally once a day  · 	theophylline 300 mg oral tablet, extended release , 1 tab(s) orally every 12 hours  · 	Advair Diskus 250 mcg-50 mcg inhalation powder 1 puff(s) inhaled 2 times a day, As Needed - for shortness of breath and/or wheezing  · 	Proventil HFA 90 mcg/inh inhalation aerosol 2 puff(s) inhaled 4 times a day, As Needed - for shortness of breath and/or wheezing  · 	fexofenadine 180 mg oral tablet, 1 tab(s) orally once a day, As Needed - for allergy symptoms  · 	azelastine 137 mcg/inh (0.1%) nasal spray 2 spray(s) nasal 2 times a day, As Needed  · 	Centrum Silver oral tablet 1 tab(s) orally once a day  · 	Vitamin B-12 1000 mcg oral tablet  , 1 tab(s) orally once a day  · 	Calcium 600+D oral tablet , 1 tab(s) orally once a day  · 	Citrucel 2 g/19 g oral powder for reconstitution , 1 dose(s) orally once a day  · 	gabapentin 300 mg oral capsule 1 cap(s) orally once a day (at bedtime)  · 	metFORMIN 500 mg oral tablet        MEDICATIONS  (STANDING):  lactated ringers. 1000 milliLiter(s) (125 mL/Hr) IV Continuous <Continuous>    MEDICATIONS  (PRN):  fentaNYL    Injectable 50 MICROGram(s) IV Push every 5 minutes PRN Severe Pain  ondansetron Injectable 4 milliGRAM(s) IV Push once PRN Nausea and/or Vomiting      Allergies    morphine (Unknown)    Intolerances        SOCIAL HISTORY:    former smoker  denies etoh/IVDA    FAMILY HISTORY:  Family history of hypertension (Father)  Family history of cerebral palsy (Child)  Family history of AIDS (Sibling)  Family history of dementia (Father)  Family history of prostate cancer (Father)  Family history of bone cancer (Mother)    ROS: +dizziness with sitting, right knee pain  Constitutional, Eyes, ENT, Cardiovascular, Respiratory,  Gastrointestinal, Genitourinary, Neurological,  Integumentary, Psychiatric, Endocrine, Heme/Lymph are otherwise negative.    Vital Signs Last 24 Hrs  T(C): 36.7 (15 Nov 2017 11:00), Max: 36.7 (15 Nov 2017 11:00)  T(F): 98.1 (15 Nov 2017 11:00), Max: 98.1 (15 Nov 2017 11:00)  HR: 61 (15 Nov 2017 12:15) (58 - 69)  BP: 122/51 (15 Nov 2017 12:15) (121/72 - 169/65)  BP(mean): --  RR: 19 (15 Nov 2017 12:15) (15 - 21)  SpO2: 96% (15 Nov 2017 12:15) (96% - 100%)    LABS:          PT/INR - ( 15 Nov 2017 08:20 )   PT: 11.0 sec;   INR: 1.00 ratio         PTT - ( 15 Nov 2017 08:20 )  PTT:35.8 sec        RADIOLOGY & ADDITIONAL STUDIES:   EXAM:  KNEE-RIGHT                          PROCEDURE DATE:  11/15/2017          INTERPRETATION:  Right knee    Indication: Status post arthroplasty, assess positioning    Comparison: 10/6    IMPRESSION: 2 portable views demonstrate baseline exam status post total   knee arthroplasty and patellar resurfacing in good alignment.                ADY SALEEM M.D., ATTENDING RADIOLOGIST  This document has been electronically signed. Nov 15 2017 11:28AM        PHYSICAL EXAM:    General: Well developed; well nourished; in no acute distress  Respiratory: No wheezes, rales or rhonchi  Cardiovascular: Regular rate and rhythm. S1 and S2 Normal; No murmurs, gallops or rubs  Gastrointestinal: Soft non-tender non-distended; Normal bowel sounds; No hepatosplenomegaly  Extremities: Right knee ACE wrap C/D/I , no edema   Vascular: Peripheral pulses palpable 2+ bilaterally  Neurological: Alert and oriented x4, no focal deficits  Skin: Warm and dry. No acute rash  Psychiatric: Cooperative and appropriate

## 2017-11-15 NOTE — CONSULT NOTE ADULT - CONSULT REASON
post operative medical management requested post operative medical management requested    S/P R TKA , POD # 0

## 2017-11-16 VITALS
SYSTOLIC BLOOD PRESSURE: 102 MMHG | TEMPERATURE: 98 F | RESPIRATION RATE: 18 BRPM | HEART RATE: 61 BPM | DIASTOLIC BLOOD PRESSURE: 55 MMHG | OXYGEN SATURATION: 96 %

## 2017-11-16 LAB
ANION GAP SERPL CALC-SCNC: 12 MMOL/L — SIGNIFICANT CHANGE UP (ref 5–17)
BUN SERPL-MCNC: 9 MG/DL — SIGNIFICANT CHANGE UP (ref 8–20)
CALCIUM SERPL-MCNC: 8.9 MG/DL — SIGNIFICANT CHANGE UP (ref 8.6–10.2)
CHLORIDE SERPL-SCNC: 102 MMOL/L — SIGNIFICANT CHANGE UP (ref 98–107)
CO2 SERPL-SCNC: 26 MMOL/L — SIGNIFICANT CHANGE UP (ref 22–29)
CREAT SERPL-MCNC: 0.45 MG/DL — LOW (ref 0.5–1.3)
GLUCOSE BLDC GLUCOMTR-MCNC: 119 MG/DL — HIGH (ref 70–99)
GLUCOSE SERPL-MCNC: 120 MG/DL — HIGH (ref 70–115)
HCT VFR BLD CALC: 34.7 % — LOW (ref 37–47)
HGB BLD-MCNC: 11.6 G/DL — LOW (ref 12–16)
MCHC RBC-ENTMCNC: 31.6 PG — HIGH (ref 27–31)
MCHC RBC-ENTMCNC: 33.4 G/DL — SIGNIFICANT CHANGE UP (ref 32–36)
MCV RBC AUTO: 94.6 FL — SIGNIFICANT CHANGE UP (ref 81–99)
PLATELET # BLD AUTO: 265 K/UL — SIGNIFICANT CHANGE UP (ref 150–400)
POTASSIUM SERPL-MCNC: 4.1 MMOL/L — SIGNIFICANT CHANGE UP (ref 3.5–5.3)
POTASSIUM SERPL-SCNC: 4.1 MMOL/L — SIGNIFICANT CHANGE UP (ref 3.5–5.3)
RBC # BLD: 3.67 M/UL — LOW (ref 4.4–5.2)
RBC # FLD: 16 % — HIGH (ref 11–15.6)
SODIUM SERPL-SCNC: 140 MMOL/L — SIGNIFICANT CHANGE UP (ref 135–145)
WBC # BLD: 9.8 K/UL — SIGNIFICANT CHANGE UP (ref 4.8–10.8)
WBC # FLD AUTO: 9.8 K/UL — SIGNIFICANT CHANGE UP (ref 4.8–10.8)

## 2017-11-16 PROCEDURE — C1713: CPT

## 2017-11-16 PROCEDURE — 36415 COLL VENOUS BLD VENIPUNCTURE: CPT

## 2017-11-16 PROCEDURE — 85730 THROMBOPLASTIN TIME PARTIAL: CPT

## 2017-11-16 PROCEDURE — 88311 DECALCIFY TISSUE: CPT

## 2017-11-16 PROCEDURE — 97163 PT EVAL HIGH COMPLEX 45 MIN: CPT

## 2017-11-16 PROCEDURE — 97110 THERAPEUTIC EXERCISES: CPT

## 2017-11-16 PROCEDURE — C1776: CPT

## 2017-11-16 PROCEDURE — 85027 COMPLETE CBC AUTOMATED: CPT

## 2017-11-16 PROCEDURE — 82962 GLUCOSE BLOOD TEST: CPT

## 2017-11-16 PROCEDURE — 88305 TISSUE EXAM BY PATHOLOGIST: CPT

## 2017-11-16 PROCEDURE — 99233 SBSQ HOSP IP/OBS HIGH 50: CPT

## 2017-11-16 PROCEDURE — 86901 BLOOD TYPING SEROLOGIC RH(D): CPT

## 2017-11-16 PROCEDURE — 97116 GAIT TRAINING THERAPY: CPT

## 2017-11-16 PROCEDURE — 73560 X-RAY EXAM OF KNEE 1 OR 2: CPT

## 2017-11-16 PROCEDURE — 80048 BASIC METABOLIC PNL TOTAL CA: CPT

## 2017-11-16 PROCEDURE — 86900 BLOOD TYPING SEROLOGIC ABO: CPT

## 2017-11-16 PROCEDURE — 85610 PROTHROMBIN TIME: CPT

## 2017-11-16 PROCEDURE — 86850 RBC ANTIBODY SCREEN: CPT

## 2017-11-16 RX ORDER — OXYCODONE HYDROCHLORIDE 5 MG/1
1 TABLET ORAL
Qty: 55 | Refills: 0 | OUTPATIENT
Start: 2017-11-16

## 2017-11-16 RX ORDER — METHYLCELLULOSE 500 MG
1 TABLET ORAL
Qty: 0 | Refills: 0 | COMMUNITY

## 2017-11-16 RX ORDER — SENNOSIDES/DOCUSATE SODIUM 8.6MG-50MG
2 TABLET ORAL
Qty: 40 | Refills: 0 | OUTPATIENT
Start: 2017-11-16 | End: 2017-11-26

## 2017-11-16 RX ORDER — FLUTICASONE PROPIONATE AND SALMETEROL 50; 250 UG/1; UG/1
1 POWDER ORAL; RESPIRATORY (INHALATION)
Qty: 0 | Refills: 0 | COMMUNITY

## 2017-11-16 RX ADMIN — SODIUM CHLORIDE 3 MILLILITER(S): 9 INJECTION INTRAMUSCULAR; INTRAVENOUS; SUBCUTANEOUS at 06:58

## 2017-11-16 RX ADMIN — LOSARTAN POTASSIUM 100 MILLIGRAM(S): 100 TABLET, FILM COATED ORAL at 06:18

## 2017-11-16 RX ADMIN — OXYCODONE HYDROCHLORIDE 10 MILLIGRAM(S): 5 TABLET ORAL at 06:18

## 2017-11-16 RX ADMIN — SODIUM CHLORIDE 80 MILLILITER(S): 9 INJECTION, SOLUTION INTRAVENOUS at 00:17

## 2017-11-16 RX ADMIN — Medication 100 MILLIGRAM(S): at 06:18

## 2017-11-16 RX ADMIN — Medication 975 MILLIGRAM(S): at 06:18

## 2017-11-16 RX ADMIN — OXYCODONE HYDROCHLORIDE 10 MILLIGRAM(S): 5 TABLET ORAL at 06:59

## 2017-11-16 RX ADMIN — ENOXAPARIN SODIUM 30 MILLIGRAM(S): 100 INJECTION SUBCUTANEOUS at 06:17

## 2017-11-16 NOTE — DISCHARGE NOTE ADULT - MEDICATION SUMMARY - MEDICATIONS TO CHANGE
I will SWITCH the dose or number of times a day I take the medications listed below when I get home from the hospital:    oxyCODONE 5 mg oral tablet  -- 1 tab(s) by mouth every 3 hours, As needed, Mild to moderate pain control Pain-2 MDD:eight

## 2017-11-16 NOTE — PROGRESS NOTE ADULT - SUBJECTIVE AND OBJECTIVE BOX
CC: Right knee pain    History of Present Illness	  77F PMH HTN, Asthma, DM, Colon Polyps, Neuropathy, Fibromyalgia, GERD, Glaucoma , chronic  Anemia , prior left knee replacement,   right  knee osteoarthritis for many yrs , had 2-3 cortisone inj , was taking Tylenol for pain , lately pain was getting worst  , now s/p right TKA , POD # 1    Interval Events:     Vital Signs Last 24 Hrs  T(C): 37.5 (16 Nov 2017 05:16), Max: 37.5 (16 Nov 2017 05:16)  T(F): 99.5 (16 Nov 2017 05:16), Max: 99.5 (16 Nov 2017 05:16)  HR: 77 (16 Nov 2017 05:16) (52 - 77)  BP: 122/68 (16 Nov 2017 05:16) (119/55 - 160/77)  BP(mean): --  RR: 20 (16 Nov 2017 05:16) (15 - 21)  SpO2: 98% (16 Nov 2017 05:16) (95% - 100%)  I&O's Detail    15 Nov 2017 07:01  -  16 Nov 2017 07:00  --------------------------------------------------------  IN:    lactated ringers.: 1200 mL    Oral Fluid: 240 mL    Solution: 50 mL    Solution: 250 mL  Total IN: 1740 mL    OUT:    Voided: 350 mL  Total OUT: 350 mL    Total NET: 1390 mL                                    11.6   9.8   )-----------( 265      ( 16 Nov 2017 06:20 )             34.7     16 Nov 2017 06:20    140    |  102    |  9.0    ----------------------------<  120    4.1     |  26.0   |  0.45     Ca    8.9        16 Nov 2017 06:20      PT/INR - ( 15 Nov 2017 08:20 )   PT: 11.0 sec;   INR: 1.00 ratio         PTT - ( 15 Nov 2017 08:20 )  PTT:35.8 sec  CAPILLARY BLOOD GLUCOSE      POCT Blood Glucose.: 119 mg/dL (16 Nov 2017 08:31)  POCT Blood Glucose.: 140 mg/dL (15 Nov 2017 21:47)  POCT Blood Glucose.: 126 mg/dL (15 Nov 2017 16:48)  POCT Blood Glucose.: 87 mg/dL (15 Nov 2017 11:15)          MEDICATIONS  (STANDING):  acetaminophen   Tablet 975 milliGRAM(s) Oral every 8 hours  dextrose 5%. 1000 milliLiter(s) (50 mL/Hr) IV Continuous <Continuous>  dextrose 50% Injectable 12.5 Gram(s) IV Push once  dextrose 50% Injectable 25 Gram(s) IV Push once  dextrose 50% Injectable 25 Gram(s) IV Push once  docusate sodium 100 milliGRAM(s) Oral three times a day  enoxaparin Injectable 30 milliGRAM(s) SubCutaneous every 12 hours  gabapentin 300 milliGRAM(s) Oral at bedtime  insulin lispro (HumaLOG) corrective regimen sliding scale   SubCutaneous three times a day before meals  losartan 100 milliGRAM(s) Oral daily  multivitamin 1 Tablet(s) Oral daily  sodium chloride 0.9% lock flush 3 milliLiter(s) IV Push every 8 hours    MEDICATIONS  (PRN):  ALBUTerol    90 MICROgram(s) HFA Inhaler 2 Puff(s) Inhalation every 6 hours PRN Bronchospasm  aluminum hydroxide/magnesium hydroxide/simethicone Suspension 30 milliLiter(s) Oral four times a day PRN Indigestion  buDESOnide 160 MICROgram(s)/formoterol 4.5 MICROgram(s) Inhaler 2 Puff(s) Inhalation two times a day PRN Bronchospasm  dextrose Gel 1 Dose(s) Oral once PRN Blood Glucose LESS THAN 70 milliGRAM(s)/deciliter  glucagon  Injectable 1 milliGRAM(s) IntraMuscular once PRN Glucose LESS THAN 70 milligrams/deciliter  ketorolac   Injectable 15 milliGRAM(s) IV Push every 6 hours PRN Severe Pain (7 - 10)  magnesium hydroxide Suspension 30 milliLiter(s) Oral daily PRN Constipation  ondansetron Injectable 4 milliGRAM(s) IV Push every 6 hours PRN Nausea and/or Vomiting  oxyCODONE    IR 5 milliGRAM(s) Oral every 3 hours PRN Mild Pain  oxyCODONE    IR 10 milliGRAM(s) Oral every 3 hours PRN Moderate Pain  senna 2 Tablet(s) Oral at bedtime PRN Constipation      RADIOLOGY & ADDITIONAL TESTS: CC: Right knee pain    History of Present Illness	  77F PMH HTN, Asthma, DM, Colon Polyps, Neuropathy, Fibromyalgia, GERD, Glaucoma , chronic  Anemia , prior left knee replacement,   right  knee osteoarthritis for many yrs , had 2-3 cortisone inj , was taking Tylenol for pain , lately pain was getting worst  , now s/p right TKA , POD # 1    Interval Events: No acute events overnight. Denies chest pain, SOB, dizziness, lightheadedness, nausea, vomiting, fever, chills. Pain controlled on current RX. Ambulating with PT.     Vital Signs Last 24 Hrs  T(C): 37.5 (16 Nov 2017 05:16), Max: 37.5 (16 Nov 2017 05:16)  T(F): 99.5 (16 Nov 2017 05:16), Max: 99.5 (16 Nov 2017 05:16)  HR: 77 (16 Nov 2017 05:16) (52 - 77)  BP: 122/68 (16 Nov 2017 05:16) (119/55 - 160/77)  BP(mean): --  RR: 20 (16 Nov 2017 05:16) (15 - 21)  SpO2: 98% (16 Nov 2017 05:16) (95% - 100%)  I&O's Detail    15 Nov 2017 07:01  -  16 Nov 2017 07:00  --------------------------------------------------------  IN:    lactated ringers.: 1200 mL    Oral Fluid: 240 mL    Solution: 50 mL    Solution: 250 mL  Total IN: 1740 mL    OUT:    Voided: 350 mL  Total OUT: 350 mL    Total NET: 1390 mL    PHYSICAL EXAM:    General: Well developed; well nourished; in no acute distress  Respiratory: No wheezes, rales or rhonchi  Cardiovascular: Regular rate and rhythm. S1 and S2 Normal; No murmurs, gallops or rubs  Gastrointestinal: Soft non-tender non-distended; Normal bowel sounds; No hepatosplenomegaly  Extremities: Right knee ACE wrap C/D/I , no edema   Vascular: Peripheral pulses palpable 2+ bilaterally  Neurological: Alert and oriented x4, no focal deficits  Skin: Warm and dry. No acute rash  Psychiatric: Cooperative and appropriate                                  11.6   9.8   )-----------( 265      ( 16 Nov 2017 06:20 )             34.7     16 Nov 2017 06:20    140    |  102    |  9.0    ----------------------------<  120    4.1     |  26.0   |  0.45     Ca    8.9        16 Nov 2017 06:20      PT/INR - ( 15 Nov 2017 08:20 )   PT: 11.0 sec;   INR: 1.00 ratio         PTT - ( 15 Nov 2017 08:20 )  PTT:35.8 sec  CAPILLARY BLOOD GLUCOSE      POCT Blood Glucose.: 119 mg/dL (16 Nov 2017 08:31)  POCT Blood Glucose.: 140 mg/dL (15 Nov 2017 21:47)  POCT Blood Glucose.: 126 mg/dL (15 Nov 2017 16:48)  POCT Blood Glucose.: 87 mg/dL (15 Nov 2017 11:15)          MEDICATIONS  (STANDING):  acetaminophen   Tablet 975 milliGRAM(s) Oral every 8 hours  dextrose 5%. 1000 milliLiter(s) (50 mL/Hr) IV Continuous <Continuous>  dextrose 50% Injectable 12.5 Gram(s) IV Push once  dextrose 50% Injectable 25 Gram(s) IV Push once  dextrose 50% Injectable 25 Gram(s) IV Push once  docusate sodium 100 milliGRAM(s) Oral three times a day  enoxaparin Injectable 30 milliGRAM(s) SubCutaneous every 12 hours  gabapentin 300 milliGRAM(s) Oral at bedtime  insulin lispro (HumaLOG) corrective regimen sliding scale   SubCutaneous three times a day before meals  losartan 100 milliGRAM(s) Oral daily  multivitamin 1 Tablet(s) Oral daily  sodium chloride 0.9% lock flush 3 milliLiter(s) IV Push every 8 hours    MEDICATIONS  (PRN):  ALBUTerol    90 MICROgram(s) HFA Inhaler 2 Puff(s) Inhalation every 6 hours PRN Bronchospasm  aluminum hydroxide/magnesium hydroxide/simethicone Suspension 30 milliLiter(s) Oral four times a day PRN Indigestion  buDESOnide 160 MICROgram(s)/formoterol 4.5 MICROgram(s) Inhaler 2 Puff(s) Inhalation two times a day PRN Bronchospasm  dextrose Gel 1 Dose(s) Oral once PRN Blood Glucose LESS THAN 70 milliGRAM(s)/deciliter  glucagon  Injectable 1 milliGRAM(s) IntraMuscular once PRN Glucose LESS THAN 70 milligrams/deciliter  ketorolac   Injectable 15 milliGRAM(s) IV Push every 6 hours PRN Severe Pain (7 - 10)  magnesium hydroxide Suspension 30 milliLiter(s) Oral daily PRN Constipation  ondansetron Injectable 4 milliGRAM(s) IV Push every 6 hours PRN Nausea and/or Vomiting  oxyCODONE    IR 5 milliGRAM(s) Oral every 3 hours PRN Mild Pain  oxyCODONE    IR 10 milliGRAM(s) Oral every 3 hours PRN Moderate Pain  senna 2 Tablet(s) Oral at bedtime PRN Constipation      RADIOLOGY & ADDITIONAL TESTS:

## 2017-11-16 NOTE — DISCHARGE NOTE ADULT - PLAN OF CARE
Decrease Pain, Improve Ambulation and Activities of Daily Living The patient will be seen in the office between 2-3 weeks for wound check. Patient may shower after post-op day #5. The dressing is to be removed on day #10 (11/26/2017).  The patient will contact the office if the wound becomes red, has increasing pain, develops bleeding or discharge, an injury occurs, or has other concerns. The patient will continue PT consistent with total knee replacement. The patient will continue LOVENOX for 2 weeks and then begin ASPIRIN for DVTP. The patient will take OXYCODONE and TYLENOL for pain control and titrate according to prescription and patient needs. The patient is FULL weight bearing. Elevation of the lower leg is recommended to reduce swelling. The patient will be seen in the office between 2-3 weeks for wound check. Patient may shower after post-op day #5. The dressing is to be removed on day #10 (11/26/2017).  The patient will contact the office if the wound becomes red, has increasing pain, develops bleeding or discharge, an injury occurs, or has other concerns. The patient will continue PT consistent with total knee replacement. The patient will continue LOVENOX for 4 weeks for DVTP. The patient will take OXYCODONE and TYLENOL for pain control and titrate according to prescription and patient needs. The patient is FULL weight bearing. Elevation of the lower leg is recommended to reduce swelling.

## 2017-11-16 NOTE — PROGRESS NOTE ADULT - SUBJECTIVE AND OBJECTIVE BOX
PA - Note    Ortho Post Op Check    Name: JAIME HODGE    MR #: [id number]    Procedure:  Surgeon:    Pt comfortable without complaints, pain controlled, found walking back from bathroom, doing well  Denies CP, SOB, N/V, numbness/tingling     General Exam:  Vital Signs Last 24 Hrs  T(C): 37 (11-16-17 @ 00:00), Max: 37 (11-16-17 @ 00:00)  T(F): 98.6 (11-16-17 @ 00:00), Max: 98.6 (11-16-17 @ 00:00)  HR: 62 (11-16-17 @ 00:00) (62 - 68)  BP: 119/55 (11-16-17 @ 00:00) (119/55 - 147/63)  BP(mean): --  RR: 20 (11-16-17 @ 00:00) (20 - 20)  SpO2: 98% (11-16-17 @ 00:00) (97% - 98%)    General: Pt Alert and oriented, NAD, controlled pain.  Dressings C/D/I. No bleeding.  Pulses: 2+ dorsalis pedis pulse. Cap refill < 2 sec.  Sensation: Grossly intact to light touch without deficit.  Motor: + ROM of toes, + Dorsal/Plantar Flexion of foot    Post-op X-Ray:   EXAM:  KNEE-RIGHT                          PROCEDURE DATE:  11/15/2017          INTERPRETATION:  Right knee    Indication: Status post arthroplasty, assess positioning    Comparison: 10/6    IMPRESSION: 2 portable views demonstrate baseline exam status post total   knee arthroplasty and patellar resurfacing in good alignment.                ADY SALEEM M.D., ATTENDING RADIOLOGIST  This document has been electronically signed. Nov 15 2017 11:28AM      A/P: 77yFemale POD#0 s/p Right Total Knee Arthroplasty   - Stable  - Pain Control  - DVT ppx: Lovenox 30mg BID, SCDs  - Post op abx: Ancef, Vancomycin  - Continue with PT protocol for Total Knee Arthroplasty  - Weight bearing status: WBAT  - D/C Planning (Home)

## 2017-11-16 NOTE — PROGRESS NOTE ADULT - SUBJECTIVE AND OBJECTIVE BOX
Ortho Post Op Check    Name: JAIME HODGE    MR #: [id number]    Procedure: Right total knee arthroplasty   Surgeon: Dr Sewell    PAST MEDICAL & SURGICAL HISTORY:  Neuropathy  Fibromyalgia  GERD (gastroesophageal reflux disease)  Glaucoma  Asthma  Colon polyps  Anemia  Hypertension  Osteoarthritis  Diabetes mellitus  History of carpal tunnel surgery of left wrist  History of left knee replacement  History of bilateral cataract extraction  S/P tonsillectomy and adenoidectomy  History of hysterectomy      Pt comfortable without complaints, pain controlled  Denies CP, SOB, N/V, numbness/tingling     General Exam:  Vital Signs Last 24 Hrs  T(C): 37.5 (11-16-17 @ 05:16), Max: 37.5 (11-16-17 @ 05:16)  T(F): 99.5 (11-16-17 @ 05:16), Max: 99.5 (11-16-17 @ 05:16)  HR: 77 (11-16-17 @ 05:16) (77 - 77)  BP: 122/68 (11-16-17 @ 05:16) (122/68 - 122/68)  BP(mean): --  RR: 20 (11-16-17 @ 05:16) (20 - 20)  SpO2: 98% (11-16-17 @ 05:16) (98% - 98%)    General: Pt Alert and oriented, NAD, controlled pain.  Right knee Dressings C/D/I. No bleeding.  Pulses: 2+ dorsalis pedis pulse. Cap refill < 2 sec.  Sensation: Grossly intact to light touch without deficit.  Motor: + EHL/FHL                          11.6   9.8   )-----------( 265      ( 16 Nov 2017 06:20 )             34.7   16 Nov 2017 06:20    140    |  102    |  9.0    ----------------------------<  120    4.1     |  26.0   |  0.45     Ca    8.9        16 Nov 2017 06:20        Post-op X-Ray:  EXAM:  KNEE-RIGHT                          PROCEDURE DATE:  11/15/2017          INTERPRETATION:  Right knee    Indication: Status post arthroplasty, assess positioning    Comparison: 10/6    IMPRESSION: 2 portable views demonstrate baseline exam status post total   knee arthroplasty and patellar resurfacing in good alignment.      ADY SALEEM M.D., ATTENDING RADIOLOGIST  This document has been electronically signed. Nov 15 2017 11:28AM      MEDICATIONS  (STANDING):  acetaminophen   Tablet 975 milliGRAM(s) Oral every 8 hours  dextrose 5%. 1000 milliLiter(s) (50 mL/Hr) IV Continuous <Continuous>  dextrose 50% Injectable 12.5 Gram(s) IV Push once  dextrose 50% Injectable 25 Gram(s) IV Push once  dextrose 50% Injectable 25 Gram(s) IV Push once  docusate sodium 100 milliGRAM(s) Oral three times a day  enoxaparin Injectable 30 milliGRAM(s) SubCutaneous every 12 hours  gabapentin 300 milliGRAM(s) Oral at bedtime  insulin lispro (HumaLOG) corrective regimen sliding scale   SubCutaneous three times a day before meals  lactated ringers. 1000 milliLiter(s) (80 mL/Hr) IV Continuous <Continuous>  losartan 100 milliGRAM(s) Oral daily  multivitamin 1 Tablet(s) Oral daily  sodium chloride 0.9% lock flush 3 milliLiter(s) IV Push every 8 hours    MEDICATIONS  (PRN):  ALBUTerol    90 MICROgram(s) HFA Inhaler 2 Puff(s) Inhalation every 6 hours PRN Bronchospasm  aluminum hydroxide/magnesium hydroxide/simethicone Suspension 30 milliLiter(s) Oral four times a day PRN Indigestion  buDESOnide 160 MICROgram(s)/formoterol 4.5 MICROgram(s) Inhaler 2 Puff(s) Inhalation two times a day PRN Bronchospasm  dextrose Gel 1 Dose(s) Oral once PRN Blood Glucose LESS THAN 70 milliGRAM(s)/deciliter  glucagon  Injectable 1 milliGRAM(s) IntraMuscular once PRN Glucose LESS THAN 70 milligrams/deciliter  ketorolac   Injectable 15 milliGRAM(s) IV Push every 6 hours PRN Severe Pain (7 - 10)  magnesium hydroxide Suspension 30 milliLiter(s) Oral daily PRN Constipation  ondansetron Injectable 4 milliGRAM(s) IV Push every 6 hours PRN Nausea and/or Vomiting  oxyCODONE    IR 5 milliGRAM(s) Oral every 3 hours PRN Mild Pain  oxyCODONE    IR 10 milliGRAM(s) Oral every 3 hours PRN Moderate Pain  senna 2 Tablet(s) Oral at bedtime PRN Constipation      A/P: 77yFemale POD#1 s/p Right TKA  - Stable  - Pain Control  - DVT ppx: Lovenox  -\Weight bearing status: WBAT  - DC to home today

## 2017-11-16 NOTE — DISCHARGE NOTE ADULT - CARE PLAN
Principal Discharge DX:	Primary osteoarthritis of right knee  Goal:	Decrease Pain, Improve Ambulation and Activities of Daily Living  Instructions for follow-up, activity and diet:	The patient will be seen in the office between 2-3 weeks for wound check. Patient may shower after post-op day #5. The dressing is to be removed on day #10 (11/26/2017).  The patient will contact the office if the wound becomes red, has increasing pain, develops bleeding or discharge, an injury occurs, or has other concerns. The patient will continue PT consistent with total knee replacement. The patient will continue LOVENOX for 2 weeks and then begin ASPIRIN for DVTP. The patient will take OXYCODONE and TYLENOL for pain control and titrate according to prescription and patient needs. The patient is FULL weight bearing. Elevation of the lower leg is recommended to reduce swelling. Principal Discharge DX:	Primary osteoarthritis of right knee  Goal:	Decrease Pain, Improve Ambulation and Activities of Daily Living  Instructions for follow-up, activity and diet:	The patient will be seen in the office between 2-3 weeks for wound check. Patient may shower after post-op day #5. The dressing is to be removed on day #10 (11/26/2017).  The patient will contact the office if the wound becomes red, has increasing pain, develops bleeding or discharge, an injury occurs, or has other concerns. The patient will continue PT consistent with total knee replacement. The patient will continue LOVENOX for 4 weeks for DVTP. The patient will take OXYCODONE and TYLENOL for pain control and titrate according to prescription and patient needs. The patient is FULL weight bearing. Elevation of the lower leg is recommended to reduce swelling.

## 2017-11-16 NOTE — PROGRESS NOTE ADULT - ASSESSMENT
77F PMH HTN, Asthma, DM, Colon Polyps, Neuropathy, Fibromyalgia, GERD, Glaucoma, prior left knee replacement 10/17,  and Anemia with right knee osteoarthritis , now s/p R TKA , POD # 1     Problem/Plan - 1:  ·  Problem: Unilateral primary osteoarthritis, right knee.  Plan: s/p R TKA , POD # 1  PT/OT/pain mgmt  DVT prophylaxis- as per ortho  Incentive spirometry  Prophylaxis of opioid  induced constipation       Problem/Plan - 2:  ·  Problem: Diabetes mellitus - type 2 .  Plan: continue ADA diet , accu check , may restart home meds on discharge      Problem/Plan - 3:  ·  Problem: Hypertension.  Plan: continue home meds with parameters      Problem/Plan - 4:  ·  Problem: Asthma.  Plan: Stable - continue home meds     Problem / Plan - -6:  Problem: Dizziness. Plan: Upon sitting post op with PT. Resolved    Problem / Plan - 7: Hx of chronic anemia - H&H stable    Medically stable for discharge home if cleared by PT.

## 2017-11-16 NOTE — DISCHARGE NOTE ADULT - MEDICATION SUMMARY - MEDICATIONS TO TAKE
I will START or STAY ON the medications listed below when I get home from the hospital:    Tylenol 325 mg oral tablet  -- 3 tab(s) by mouth every 8 hours for supplemental pain control  -- Indication: For Pr pain    oxyCODONE 10 mg oral tablet  -- 1 tab(s) by mouth every 3 hours, As needed for Pain MDD:8 pt may halve tablets  -- Indication: For Prn pain    losartan 100 mg oral tablet  -- 1 tab(s) by mouth once a day  -- Indication: For home med    enoxaparin 30 mg/0.3 mL injectable solution  -- 30 milligram(s) injectable every 12 hours     DISP: 60 injections  -- Indication: For DVTP    gabapentin 300 mg oral capsule  -- 1 cap(s) by mouth once a day (at bedtime)  -- Indication: For home med    metFORMIN 500 mg oral tablet  -- 1 tab(s) by mouth once a day (at bedtime)  -- Indication: For home med    meclizine 12.5 mg oral tablet  -- 1 tab(s) by mouth once a day, As Needed  -- Indication: For home med    fexofenadine 180 mg oral tablet  -- 1 tab(s) by mouth once a day, As Needed - for allergy symptoms  -- Indication: For home med    theophylline 300 mg oral tablet, extended release  -- 1 tab(s) by mouth every 12 hours  -- Indication: For home med    Proventil HFA 90 mcg/inh inhalation aerosol  -- 2 puff(s) inhaled 4 times a day, As Needed - for shortness of breath and/or wheezing  -- Indication: For home med    Senna S 50 mg-8.6 mg oral tablet  -- 2 tab(s) by mouth once a day (at bedtime)   -- Medication should be taken with plenty of water.    -- Indication: For opiod constipation prevention    azelastine 137 mcg/inh (0.1%) nasal spray  -- 2 spray(s) into nose 2 times a day, As Needed  -- Indication: For home med    Centrum Silver oral tablet  -- 1 tab(s) by mouth once a day  -- Indication: For home med    Calcium 600+D oral tablet  -- 1 tab(s) by mouth once a day  -- Indication: For home med    Vitamin B-12 1000 mcg oral tablet  -- 1 tab(s) by mouth once a day  -- Indication: For home med

## 2017-11-16 NOTE — DISCHARGE NOTE ADULT - HOSPITAL COURSE
The patient underwent a RIGHT TOTAL KNEE REPLACEMENT on 11/15/2017. The patient received antibiotics consistent with SCIP guidelines. The patient underwent the procedure and had no intra-operative complications. Post-operatively, the patient was seen by medicine and PT. The patient received LOVENOX for DVTP. The patient received pain medications per orthopedic pain management protocol and the pain was appropriately controlled. The patient did not have any post-operative medical complications. The patient was discharged in stable condition.

## 2017-11-16 NOTE — DISCHARGE NOTE ADULT - CARE PROVIDER_API CALL
Salo Sewell (MD), Orthopaedic Surgery  [address]  [place] [address]  Phone: [phone]  Fax: [phone]

## 2017-11-21 ENCOUNTER — OTHER (OUTPATIENT)
Age: 77
End: 2017-11-21

## 2017-11-21 ENCOUNTER — APPOINTMENT (OUTPATIENT)
Dept: ORTHOPEDIC SURGERY | Facility: CLINIC | Age: 77
End: 2017-11-21
Payer: MEDICARE

## 2017-11-21 VITALS
WEIGHT: 146 LBS | HEIGHT: 61.5 IN | TEMPERATURE: 98.2 F | BODY MASS INDEX: 27.21 KG/M2 | SYSTOLIC BLOOD PRESSURE: 159 MMHG | HEART RATE: 82 BPM | DIASTOLIC BLOOD PRESSURE: 78 MMHG

## 2017-11-21 PROCEDURE — 99024 POSTOP FOLLOW-UP VISIT: CPT

## 2017-11-21 RX ORDER — AZELASTINE HYDROCHLORIDE 137 UG/1
0.1 SPRAY, METERED NASAL
Refills: 0 | Status: DISCONTINUED | COMMUNITY
End: 2017-11-21

## 2017-11-22 LAB — SURGICAL PATHOLOGY FINAL REPORT - CH: SIGNIFICANT CHANGE UP

## 2017-11-30 ENCOUNTER — APPOINTMENT (OUTPATIENT)
Dept: ORTHOPEDIC SURGERY | Facility: CLINIC | Age: 77
End: 2017-11-30
Payer: MEDICARE

## 2017-11-30 VITALS
DIASTOLIC BLOOD PRESSURE: 81 MMHG | BODY MASS INDEX: 27.21 KG/M2 | WEIGHT: 146 LBS | HEIGHT: 61.5 IN | SYSTOLIC BLOOD PRESSURE: 155 MMHG | HEART RATE: 93 BPM

## 2017-11-30 PROCEDURE — 73562 X-RAY EXAM OF KNEE 3: CPT | Mod: RT

## 2017-11-30 PROCEDURE — 99024 POSTOP FOLLOW-UP VISIT: CPT

## 2017-12-18 ENCOUNTER — OTHER (OUTPATIENT)
Age: 77
End: 2017-12-18

## 2017-12-27 ENCOUNTER — APPOINTMENT (OUTPATIENT)
Dept: ORTHOPEDIC SURGERY | Facility: CLINIC | Age: 77
End: 2017-12-27
Payer: MEDICARE

## 2017-12-27 VITALS
SYSTOLIC BLOOD PRESSURE: 147 MMHG | DIASTOLIC BLOOD PRESSURE: 73 MMHG | WEIGHT: 146 LBS | HEIGHT: 61.5 IN | BODY MASS INDEX: 27.21 KG/M2 | HEART RATE: 76 BPM

## 2017-12-27 PROCEDURE — 73562 X-RAY EXAM OF KNEE 3: CPT | Mod: RT

## 2017-12-27 PROCEDURE — 99024 POSTOP FOLLOW-UP VISIT: CPT

## 2017-12-28 ENCOUNTER — OTHER (OUTPATIENT)
Age: 77
End: 2017-12-28

## 2018-01-29 ENCOUNTER — OTHER (OUTPATIENT)
Age: 78
End: 2018-01-29

## 2018-02-06 ENCOUNTER — APPOINTMENT (OUTPATIENT)
Dept: ORTHOPEDIC SURGERY | Facility: CLINIC | Age: 78
End: 2018-02-06
Payer: MEDICARE

## 2018-02-06 VITALS
SYSTOLIC BLOOD PRESSURE: 143 MMHG | BODY MASS INDEX: 27.21 KG/M2 | HEIGHT: 61.5 IN | DIASTOLIC BLOOD PRESSURE: 83 MMHG | WEIGHT: 146 LBS | HEART RATE: 68 BPM

## 2018-02-06 DIAGNOSIS — Z96.651 AFTERCARE FOLLOWING JOINT REPLACEMENT SURGERY: ICD-10-CM

## 2018-02-06 DIAGNOSIS — Z47.1 AFTERCARE FOLLOWING JOINT REPLACEMENT SURGERY: ICD-10-CM

## 2018-02-06 DIAGNOSIS — Z96.659 PRESENCE OF UNSPECIFIED ARTIFICIAL KNEE JOINT: ICD-10-CM

## 2018-02-06 PROCEDURE — 73562 X-RAY EXAM OF KNEE 3: CPT | Mod: LT

## 2018-02-06 PROCEDURE — 99024 POSTOP FOLLOW-UP VISIT: CPT

## 2018-02-20 ENCOUNTER — OTHER (OUTPATIENT)
Age: 78
End: 2018-02-20

## 2018-02-21 ENCOUNTER — APPOINTMENT (OUTPATIENT)
Dept: ORTHOPEDIC SURGERY | Facility: CLINIC | Age: 78
End: 2018-02-21
Payer: MEDICARE

## 2018-02-21 VITALS
SYSTOLIC BLOOD PRESSURE: 133 MMHG | HEART RATE: 85 BPM | HEIGHT: 61.5 IN | DIASTOLIC BLOOD PRESSURE: 72 MMHG | BODY MASS INDEX: 27.21 KG/M2 | WEIGHT: 146 LBS

## 2018-02-21 PROCEDURE — 73562 X-RAY EXAM OF KNEE 3: CPT | Mod: RT

## 2018-02-21 PROCEDURE — 99213 OFFICE O/P EST LOW 20 MIN: CPT

## 2018-02-22 ENCOUNTER — OTHER (OUTPATIENT)
Age: 78
End: 2018-02-22

## 2018-03-13 ENCOUNTER — APPOINTMENT (OUTPATIENT)
Dept: ORTHOPEDIC SURGERY | Facility: CLINIC | Age: 78
End: 2018-03-13

## 2018-06-19 NOTE — DISCHARGE NOTE ADULT - NSFTFSERV1RD_GEN_ALL_CORE
rehabilitation services Z Plasty Text: The lesion was extirpated to the level of the fat with a #15 scalpel blade.  Given the location of the defect, shape of the defect and the proximity to free margins a Z-plasty was deemed most appropriate for repair.  Using a sterile surgical marker, the appropriate transposition arms of the Z-plasty were drawn incorporating the defect and placing the expected incisions within the relaxed skin tension lines where possible.    The area thus outlined was incised deep to adipose tissue with a #15 scalpel blade.  The skin margins were undermined to an appropriate distance in all directions utilizing iris scissors.  The opposing transposition arms were then transposed into place in opposite direction and anchored with interrupted buried subcutaneous sutures.

## 2018-10-22 ENCOUNTER — OTHER (OUTPATIENT)
Age: 78
End: 2018-10-22

## 2018-10-29 ENCOUNTER — APPOINTMENT (OUTPATIENT)
Dept: ORTHOPEDIC SURGERY | Facility: CLINIC | Age: 78
End: 2018-10-29
Payer: MEDICARE

## 2018-10-29 VITALS
DIASTOLIC BLOOD PRESSURE: 85 MMHG | BODY MASS INDEX: 27.21 KG/M2 | SYSTOLIC BLOOD PRESSURE: 154 MMHG | WEIGHT: 146 LBS | HEART RATE: 76 BPM | HEIGHT: 61.5 IN

## 2018-10-29 DIAGNOSIS — Z96.652 PRESENCE OF LEFT ARTIFICIAL KNEE JOINT: ICD-10-CM

## 2018-10-29 DIAGNOSIS — Z96.651 PRESENCE OF RIGHT ARTIFICIAL KNEE JOINT: ICD-10-CM

## 2018-10-29 DIAGNOSIS — M16.11 UNILATERAL PRIMARY OSTEOARTHRITIS, RIGHT HIP: ICD-10-CM

## 2018-10-29 DIAGNOSIS — M25.559 PAIN IN UNSPECIFIED HIP: ICD-10-CM

## 2018-10-29 PROCEDURE — 73562 X-RAY EXAM OF KNEE 3: CPT | Mod: 50

## 2018-10-29 PROCEDURE — 73502 X-RAY EXAM HIP UNI 2-3 VIEWS: CPT | Mod: RT

## 2018-10-29 PROCEDURE — 99215 OFFICE O/P EST HI 40 MIN: CPT

## 2018-12-02 PROBLEM — D64.9 ANEMIA, UNSPECIFIED: Chronic | Status: ACTIVE | Noted: 2017-09-21

## 2018-12-02 PROBLEM — J45.909 UNSPECIFIED ASTHMA, UNCOMPLICATED: Chronic | Status: ACTIVE | Noted: 2017-09-21

## 2018-12-02 PROBLEM — I10 ESSENTIAL (PRIMARY) HYPERTENSION: Chronic | Status: ACTIVE | Noted: 2017-09-21

## 2018-12-02 PROBLEM — K21.9 GASTRO-ESOPHAGEAL REFLUX DISEASE WITHOUT ESOPHAGITIS: Chronic | Status: ACTIVE | Noted: 2017-09-21

## 2018-12-02 PROBLEM — H40.9 UNSPECIFIED GLAUCOMA: Chronic | Status: ACTIVE | Noted: 2017-09-21

## 2018-12-02 PROBLEM — M79.7 FIBROMYALGIA: Chronic | Status: ACTIVE | Noted: 2017-09-21

## 2018-12-02 PROBLEM — E11.9 TYPE 2 DIABETES MELLITUS WITHOUT COMPLICATIONS: Chronic | Status: ACTIVE | Noted: 2017-09-21

## 2018-12-02 PROBLEM — G62.9 POLYNEUROPATHY, UNSPECIFIED: Chronic | Status: ACTIVE | Noted: 2017-09-21

## 2018-12-02 PROBLEM — K63.5 POLYP OF COLON: Chronic | Status: ACTIVE | Noted: 2017-09-21

## 2019-01-03 ENCOUNTER — OUTPATIENT (OUTPATIENT)
Dept: OUTPATIENT SERVICES | Facility: HOSPITAL | Age: 79
LOS: 1 days | End: 2019-01-03
Payer: COMMERCIAL

## 2019-01-03 VITALS
SYSTOLIC BLOOD PRESSURE: 134 MMHG | HEART RATE: 80 BPM | RESPIRATION RATE: 16 BRPM | WEIGHT: 144.62 LBS | DIASTOLIC BLOOD PRESSURE: 84 MMHG | HEIGHT: 61.5 IN | TEMPERATURE: 98 F

## 2019-01-03 DIAGNOSIS — I10 ESSENTIAL (PRIMARY) HYPERTENSION: ICD-10-CM

## 2019-01-03 DIAGNOSIS — M16.11 UNILATERAL PRIMARY OSTEOARTHRITIS, RIGHT HIP: ICD-10-CM

## 2019-01-03 DIAGNOSIS — Z90.89 ACQUIRED ABSENCE OF OTHER ORGANS: Chronic | ICD-10-CM

## 2019-01-03 DIAGNOSIS — E11.9 TYPE 2 DIABETES MELLITUS WITHOUT COMPLICATIONS: ICD-10-CM

## 2019-01-03 DIAGNOSIS — M19.90 UNSPECIFIED OSTEOARTHRITIS, UNSPECIFIED SITE: ICD-10-CM

## 2019-01-03 DIAGNOSIS — Z96.659 PRESENCE OF UNSPECIFIED ARTIFICIAL KNEE JOINT: Chronic | ICD-10-CM

## 2019-01-03 DIAGNOSIS — Z96.652 PRESENCE OF LEFT ARTIFICIAL KNEE JOINT: Chronic | ICD-10-CM

## 2019-01-03 DIAGNOSIS — Z90.710 ACQUIRED ABSENCE OF BOTH CERVIX AND UTERUS: Chronic | ICD-10-CM

## 2019-01-03 DIAGNOSIS — Z98.41 CATARACT EXTRACTION STATUS, RIGHT EYE: Chronic | ICD-10-CM

## 2019-01-03 DIAGNOSIS — Z01.818 ENCOUNTER FOR OTHER PREPROCEDURAL EXAMINATION: ICD-10-CM

## 2019-01-03 DIAGNOSIS — Z98.890 OTHER SPECIFIED POSTPROCEDURAL STATES: Chronic | ICD-10-CM

## 2019-01-03 DIAGNOSIS — Z29.9 ENCOUNTER FOR PROPHYLACTIC MEASURES, UNSPECIFIED: ICD-10-CM

## 2019-01-03 LAB
ALBUMIN SERPL ELPH-MCNC: 3.9 G/DL — SIGNIFICANT CHANGE UP (ref 3.3–5.2)
ALP SERPL-CCNC: 80 U/L — SIGNIFICANT CHANGE UP (ref 40–120)
ALT FLD-CCNC: 12 U/L — SIGNIFICANT CHANGE UP
ANION GAP SERPL CALC-SCNC: 14 MMOL/L — SIGNIFICANT CHANGE UP (ref 5–17)
APTT BLD: 35.5 SEC — SIGNIFICANT CHANGE UP (ref 27.5–36.3)
AST SERPL-CCNC: 13 U/L — SIGNIFICANT CHANGE UP
BASOPHILS # BLD AUTO: 0 K/UL — SIGNIFICANT CHANGE UP (ref 0–0.2)
BASOPHILS NFR BLD AUTO: 0.2 % — SIGNIFICANT CHANGE UP (ref 0–2)
BILIRUB SERPL-MCNC: 0.3 MG/DL — LOW (ref 0.4–2)
BLD GP AB SCN SERPL QL: SIGNIFICANT CHANGE UP
BUN SERPL-MCNC: 11 MG/DL — SIGNIFICANT CHANGE UP (ref 8–20)
CALCIUM SERPL-MCNC: 9.9 MG/DL — SIGNIFICANT CHANGE UP (ref 8.6–10.2)
CHLORIDE SERPL-SCNC: 104 MMOL/L — SIGNIFICANT CHANGE UP (ref 98–107)
CO2 SERPL-SCNC: 25 MMOL/L — SIGNIFICANT CHANGE UP (ref 22–29)
CREAT SERPL-MCNC: 0.47 MG/DL — LOW (ref 0.5–1.3)
EOSINOPHIL # BLD AUTO: 0.3 K/UL — SIGNIFICANT CHANGE UP (ref 0–0.5)
EOSINOPHIL NFR BLD AUTO: 3.2 % — SIGNIFICANT CHANGE UP (ref 0–6)
GLUCOSE SERPL-MCNC: 100 MG/DL — SIGNIFICANT CHANGE UP (ref 70–115)
HBA1C BLD-MCNC: 5.5 % — SIGNIFICANT CHANGE UP (ref 4–5.6)
HCT VFR BLD CALC: 39.3 % — SIGNIFICANT CHANGE UP (ref 37–47)
HGB BLD-MCNC: 13.2 G/DL — SIGNIFICANT CHANGE UP (ref 12–16)
INR BLD: 0.93 RATIO — SIGNIFICANT CHANGE UP (ref 0.88–1.16)
LYMPHOCYTES # BLD AUTO: 1.8 K/UL — SIGNIFICANT CHANGE UP (ref 1–4.8)
LYMPHOCYTES # BLD AUTO: 20.4 % — SIGNIFICANT CHANGE UP (ref 20–55)
MCHC RBC-ENTMCNC: 31.7 PG — HIGH (ref 27–31)
MCHC RBC-ENTMCNC: 33.6 G/DL — SIGNIFICANT CHANGE UP (ref 32–36)
MCV RBC AUTO: 94.5 FL — SIGNIFICANT CHANGE UP (ref 81–99)
MONOCYTES # BLD AUTO: 0.6 K/UL — SIGNIFICANT CHANGE UP (ref 0–0.8)
MONOCYTES NFR BLD AUTO: 6.9 % — SIGNIFICANT CHANGE UP (ref 3–10)
MRSA PCR RESULT.: SIGNIFICANT CHANGE UP
NEUTROPHILS # BLD AUTO: 6 K/UL — SIGNIFICANT CHANGE UP (ref 1.8–8)
NEUTROPHILS NFR BLD AUTO: 69.2 % — SIGNIFICANT CHANGE UP (ref 37–73)
PLATELET # BLD AUTO: 308 K/UL — SIGNIFICANT CHANGE UP (ref 150–400)
POTASSIUM SERPL-MCNC: 3.8 MMOL/L — SIGNIFICANT CHANGE UP (ref 3.5–5.3)
POTASSIUM SERPL-SCNC: 3.8 MMOL/L — SIGNIFICANT CHANGE UP (ref 3.5–5.3)
PROT SERPL-MCNC: 6.9 G/DL — SIGNIFICANT CHANGE UP (ref 6.6–8.7)
PROTHROM AB SERPL-ACNC: 10.7 SEC — SIGNIFICANT CHANGE UP (ref 10–12.9)
RBC # BLD: 4.16 M/UL — LOW (ref 4.4–5.2)
RBC # FLD: 15.1 % — SIGNIFICANT CHANGE UP (ref 11–15.6)
S AUREUS DNA NOSE QL NAA+PROBE: SIGNIFICANT CHANGE UP
SODIUM SERPL-SCNC: 143 MMOL/L — SIGNIFICANT CHANGE UP (ref 135–145)
TYPE + AB SCN PNL BLD: SIGNIFICANT CHANGE UP
WBC # BLD: 8.7 K/UL — SIGNIFICANT CHANGE UP (ref 4.8–10.8)
WBC # FLD AUTO: 8.7 K/UL — SIGNIFICANT CHANGE UP (ref 4.8–10.8)

## 2019-01-03 PROCEDURE — 86900 BLOOD TYPING SEROLOGIC ABO: CPT

## 2019-01-03 PROCEDURE — 85730 THROMBOPLASTIN TIME PARTIAL: CPT

## 2019-01-03 PROCEDURE — 85610 PROTHROMBIN TIME: CPT

## 2019-01-03 PROCEDURE — 85027 COMPLETE CBC AUTOMATED: CPT

## 2019-01-03 PROCEDURE — 86850 RBC ANTIBODY SCREEN: CPT

## 2019-01-03 PROCEDURE — 93010 ELECTROCARDIOGRAM REPORT: CPT

## 2019-01-03 PROCEDURE — 36415 COLL VENOUS BLD VENIPUNCTURE: CPT

## 2019-01-03 PROCEDURE — 86901 BLOOD TYPING SEROLOGIC RH(D): CPT

## 2019-01-03 PROCEDURE — 87640 STAPH A DNA AMP PROBE: CPT

## 2019-01-03 PROCEDURE — 83036 HEMOGLOBIN GLYCOSYLATED A1C: CPT

## 2019-01-03 PROCEDURE — G0463: CPT

## 2019-01-03 PROCEDURE — 93005 ELECTROCARDIOGRAM TRACING: CPT

## 2019-01-03 PROCEDURE — 80053 COMPREHEN METABOLIC PANEL: CPT

## 2019-01-03 RX ORDER — FEXOFENADINE HCL 30 MG
1 TABLET ORAL
Qty: 0 | Refills: 0 | COMMUNITY

## 2019-01-03 RX ORDER — AZELASTINE 137 UG/1
2 SPRAY, METERED NASAL
Qty: 0 | Refills: 0 | COMMUNITY

## 2019-01-03 RX ORDER — SODIUM CHLORIDE 9 MG/ML
3 INJECTION INTRAMUSCULAR; INTRAVENOUS; SUBCUTANEOUS EVERY 8 HOURS
Qty: 0 | Refills: 0 | Status: DISCONTINUED | OUTPATIENT
Start: 2019-01-23 | End: 2019-01-25

## 2019-01-03 NOTE — H&P PST ADULT - PMH
Anemia    Asthma    Colon polyps    Diabetes mellitus    Fibromyalgia    GERD (gastroesophageal reflux disease)    Glaucoma    Hypertension    Neuropathy    Osteoarthritis  hip  Vertigo

## 2019-01-03 NOTE — H&P PST ADULT - PSH
H/O total knee replacement  bilateral 2017  History of bilateral cataract extraction    History of carpal tunnel surgery of left wrist    History of hysterectomy    History of left knee replacement    S/P tonsillectomy and adenoidectomy

## 2019-01-03 NOTE — PATIENT PROFILE ADULT - NSPROEDALEARNPREF_GEN_A_NUR
pre op instructions given pre op scrub instructions given to patient/individual instruction/verbal instruction

## 2019-01-03 NOTE — H&P PST ADULT - HISTORY OF PRESENT ILLNESS
78 year old female with arthritis presents with c/o right hip pain that radiates down leg . Uses cane for ambulation . PAin with walking and sleep . Takes tylenol for pain little relief. Pt scheduled for right hip replacement .

## 2019-01-03 NOTE — H&P PST ADULT - ASSESSMENT
Pleasant 78 year old female with right hip pain presents for right hip replacement with DR. Sewell.  KIRKI SCORE [CLOT]    AGE RELATED RISK FACTORS                                                       MOBILITY RELATED FACTORS  [ ] Age 41-60 years                                            (1 Point)                  [ ] Bed rest                                                        (1 Point)  [ ] Age: 61-74 years                                           (2 Points)                 [ ] Plaster cast                                                   (2 Points)  [X ] Age= 75 years                                              (3 Points)                 [ ] Bed bound for more than 72 hours                 (2 Points)    DISEASE RELATED RISK FACTORS                                               GENDER SPECIFIC FACTORS  [ ] Edema in the lower extremities                       (1 Point)                  [ ] Pregnancy                                                     (1 Point)  [ ] Varicose veins                                               (1 Point)                  [ ] Post-partum < 6 weeks                                   (1 Point)             [ ] BMI > 25 Kg/m2                                            (1 Point)                  [ ] Hormonal therapy  or oral contraception          (1 Point)                 [ ] Sepsis (in the previous month)                        (1 Point)                  [ ] History of pregnancy complications                 (1 point)  [ ] Pneumonia or serious lung disease                                               [ ] Unexplained or recurrent                     (1 Point)           (in the previous month)                               (1 Point)  [ ] Abnormal pulmonary function test                     (1 Point)                 SURGERY RELATED RISK FACTORS  [ ] Acute myocardial infarction                              (1 Point)                 [ ]  Section                                             (1 Point)  [ ] Congestive heart failure (in the previous month)  (1 Point)               [ ] Minor surgery                                                  (1 Point)   [ ] Inflammatory bowel disease                             (1 Point)                 [ ] Arthroscopic surgery                                        (2 Points)  [ ] Central venous access                                      (2 Points)                [ ] General surgery lasting more than 45 minutes   (2 Points)       [ ] Stroke (in the previous month)                          (5 Points)               [ X] Elective arthroplasty                                         (5 Points)                                                                                                                                               HEMATOLOGY RELATED FACTORS                                                 TRAUMA RELATED RISK FACTORS  [ ] Prior episodes of VTE                                     (3 Points)                 [ ] Fracture of the hip, pelvis, or leg                       (5 Points)  [ ] Positive family history for VTE                         (3 Points)                 [ ] Acute spinal cord injury (in the previous month)  (5 Points)  [ ] Prothrombin 78283 A                                     (3 Points)                 [ ] Paralysis  (less than 1 month)                             (5 Points)  [ ] Factor V Leiden                                             (3 Points)                  [ ] Multiple Trauma within 1 month                        (5 Points)  [ ] Lupus anticoagulants                                     (3 Points)                                                           [ ] Anticardiolipin antibodies                               (3 Points)                                                       [ ] High homocysteine in the blood                      (3 Points)                                             [ ] Other congenital or acquired thrombophilia      (3 Points)                                                [ ] Heparin induced thrombocytopenia                  (3 Points)                                          Total Score [      8    ]  OPIOID RISK TOOL    NEENA EACH BOX THAT APPLIES AND ADD TOTALS AT THE END    FAMILY HISTORY OF SUBSTANCE ABUSE                 FEMALE         MALE                                                Alcohol                             [  ]1 pt          [  ]3pts                                               Illegal Durgs                     [  ]2 pts        [  ]3pts                                               Rx Drugs                           [  ]4 pts        [  ]4 pts    PERSONAL HISTORY OF SUBSTANCE ABUSE                                                                                          Alcohol                             [  ]3 pts       [  ]3 pts                                               Illegal Durgs                     [  ]4 pts        [  ]4 pts                                               Rx Drugs                           [  ]5 pts        [  ]5 pts    AGE BETWEEN 16-45 YEARS                                      [  ]1 pt         [  ]1 pt    HISTORY OF PREADOLESCENT   SEXUAL ABUSE                                                             [  ]3 pts        [  ]0pts    PSYCHOLOGICAL DISEASE                     ADD, OCD, Bipolar, Schizophrenia        [  ]2 pts         [  ]2 pts                      Depression                                               [  ]1 pt           [  ]1 pt           SCORING TOTAL   (add numbers and type here)              (*0**)                                     A score of 3 or lower indicated LOW risk for future opiod abuse  A score of 4 to 7 indicated moderate risk for future opiod abuse  A score of 8 or higher indicates a high risk for opiod abuse

## 2019-01-04 PROBLEM — M19.90 UNSPECIFIED OSTEOARTHRITIS, UNSPECIFIED SITE: Chronic | Status: ACTIVE | Noted: 2017-09-21

## 2019-01-22 ENCOUNTER — TRANSCRIPTION ENCOUNTER (OUTPATIENT)
Age: 79
End: 2019-01-22

## 2019-01-23 ENCOUNTER — INPATIENT (INPATIENT)
Facility: HOSPITAL | Age: 79
LOS: 1 days | Discharge: HOME | DRG: 470 | End: 2019-01-25
Attending: ORTHOPAEDIC SURGERY | Admitting: ORTHOPAEDIC SURGERY
Payer: COMMERCIAL

## 2019-01-23 ENCOUNTER — TRANSCRIPTION ENCOUNTER (OUTPATIENT)
Age: 79
End: 2019-01-23

## 2019-01-23 ENCOUNTER — APPOINTMENT (OUTPATIENT)
Dept: ORTHOPEDIC SURGERY | Facility: HOSPITAL | Age: 79
End: 2019-01-23

## 2019-01-23 VITALS
RESPIRATION RATE: 16 BRPM | HEART RATE: 98 BPM | TEMPERATURE: 99 F | HEIGHT: 61.5 IN | DIASTOLIC BLOOD PRESSURE: 63 MMHG | WEIGHT: 144.62 LBS | OXYGEN SATURATION: 100 % | SYSTOLIC BLOOD PRESSURE: 136 MMHG

## 2019-01-23 DIAGNOSIS — Z98.41 CATARACT EXTRACTION STATUS, RIGHT EYE: Chronic | ICD-10-CM

## 2019-01-23 DIAGNOSIS — Z96.652 PRESENCE OF LEFT ARTIFICIAL KNEE JOINT: Chronic | ICD-10-CM

## 2019-01-23 DIAGNOSIS — Z98.890 OTHER SPECIFIED POSTPROCEDURAL STATES: Chronic | ICD-10-CM

## 2019-01-23 DIAGNOSIS — Z90.89 ACQUIRED ABSENCE OF OTHER ORGANS: Chronic | ICD-10-CM

## 2019-01-23 DIAGNOSIS — M16.11 UNILATERAL PRIMARY OSTEOARTHRITIS, RIGHT HIP: ICD-10-CM

## 2019-01-23 DIAGNOSIS — Z90.710 ACQUIRED ABSENCE OF BOTH CERVIX AND UTERUS: Chronic | ICD-10-CM

## 2019-01-23 DIAGNOSIS — Z96.659 PRESENCE OF UNSPECIFIED ARTIFICIAL KNEE JOINT: Chronic | ICD-10-CM

## 2019-01-23 LAB
GLUCOSE BLDC GLUCOMTR-MCNC: 112 MG/DL — HIGH (ref 70–99)
GLUCOSE BLDC GLUCOMTR-MCNC: 112 MG/DL — HIGH (ref 70–99)
GLUCOSE BLDC GLUCOMTR-MCNC: 93 MG/DL — SIGNIFICANT CHANGE UP (ref 70–99)

## 2019-01-23 PROCEDURE — 27130 TOTAL HIP ARTHROPLASTY: CPT | Mod: RT

## 2019-01-23 PROCEDURE — 99222 1ST HOSP IP/OBS MODERATE 55: CPT

## 2019-01-23 PROCEDURE — 27130 TOTAL HIP ARTHROPLASTY: CPT | Mod: AS,RT

## 2019-01-23 PROCEDURE — 73502 X-RAY EXAM HIP UNI 2-3 VIEWS: CPT | Mod: 26,RT

## 2019-01-23 RX ORDER — THEOPHYLLINE ANHYDROUS 200 MG
300 TABLET, EXTENDED RELEASE 12 HR ORAL
Qty: 0 | Refills: 0 | Status: DISCONTINUED | OUTPATIENT
Start: 2019-01-23 | End: 2019-01-24

## 2019-01-23 RX ORDER — ENOXAPARIN SODIUM 100 MG/ML
40 INJECTION SUBCUTANEOUS EVERY 24 HOURS
Qty: 0 | Refills: 0 | Status: DISCONTINUED | OUTPATIENT
Start: 2019-01-24 | End: 2019-01-25

## 2019-01-23 RX ORDER — MECLIZINE HCL 12.5 MG
1 TABLET ORAL
Qty: 0 | Refills: 0 | COMMUNITY

## 2019-01-23 RX ORDER — ALBUTEROL 90 UG/1
2 AEROSOL, METERED ORAL
Qty: 0 | Refills: 0 | COMMUNITY

## 2019-01-23 RX ORDER — HYDROMORPHONE HYDROCHLORIDE 2 MG/ML
0.5 INJECTION INTRAMUSCULAR; INTRAVENOUS; SUBCUTANEOUS
Qty: 0 | Refills: 0 | Status: DISCONTINUED | OUTPATIENT
Start: 2019-01-23 | End: 2019-01-25

## 2019-01-23 RX ORDER — MULTIVIT-MIN/FERROUS GLUCONATE 9 MG/15 ML
1 LIQUID (ML) ORAL DAILY
Qty: 0 | Refills: 0 | Status: DISCONTINUED | OUTPATIENT
Start: 2019-01-23 | End: 2019-01-25

## 2019-01-23 RX ORDER — METOCLOPRAMIDE HCL 10 MG
10 TABLET ORAL ONCE
Qty: 0 | Refills: 0 | Status: DISCONTINUED | OUTPATIENT
Start: 2019-01-23 | End: 2019-01-23

## 2019-01-23 RX ORDER — HYDROMORPHONE HYDROCHLORIDE 2 MG/ML
0.5 INJECTION INTRAMUSCULAR; INTRAVENOUS; SUBCUTANEOUS EVERY 4 HOURS
Qty: 0 | Refills: 0 | Status: DISCONTINUED | OUTPATIENT
Start: 2019-01-23 | End: 2019-01-23

## 2019-01-23 RX ORDER — MAGNESIUM HYDROXIDE 400 MG/1
30 TABLET, CHEWABLE ORAL AT BEDTIME
Qty: 0 | Refills: 0 | Status: DISCONTINUED | OUTPATIENT
Start: 2019-01-23 | End: 2019-01-25

## 2019-01-23 RX ORDER — DOCUSATE SODIUM 100 MG
100 CAPSULE ORAL THREE TIMES A DAY
Qty: 0 | Refills: 0 | Status: DISCONTINUED | OUTPATIENT
Start: 2019-01-23 | End: 2019-01-25

## 2019-01-23 RX ORDER — INSULIN LISPRO 100/ML
VIAL (ML) SUBCUTANEOUS
Qty: 0 | Refills: 0 | Status: DISCONTINUED | OUTPATIENT
Start: 2019-01-23 | End: 2019-01-25

## 2019-01-23 RX ORDER — DEXTROSE 50 % IN WATER 50 %
12.5 SYRINGE (ML) INTRAVENOUS ONCE
Qty: 0 | Refills: 0 | Status: DISCONTINUED | OUTPATIENT
Start: 2019-01-23 | End: 2019-01-25

## 2019-01-23 RX ORDER — CEFAZOLIN SODIUM 1 G
2000 VIAL (EA) INJECTION ONCE
Qty: 0 | Refills: 0 | Status: DISCONTINUED | OUTPATIENT
Start: 2019-01-23 | End: 2019-01-23

## 2019-01-23 RX ORDER — OXYCODONE HYDROCHLORIDE 5 MG/1
10 TABLET ORAL
Qty: 0 | Refills: 0 | Status: DISCONTINUED | OUTPATIENT
Start: 2019-01-23 | End: 2019-01-25

## 2019-01-23 RX ORDER — CEFAZOLIN SODIUM 1 G
2000 VIAL (EA) INJECTION
Qty: 0 | Refills: 0 | Status: COMPLETED | OUTPATIENT
Start: 2019-01-23 | End: 2019-01-24

## 2019-01-23 RX ORDER — ACETAMINOPHEN 500 MG
1000 TABLET ORAL ONCE
Qty: 0 | Refills: 0 | Status: DISCONTINUED | OUTPATIENT
Start: 2019-01-23 | End: 2019-01-23

## 2019-01-23 RX ORDER — SENNA PLUS 8.6 MG/1
2 TABLET ORAL AT BEDTIME
Qty: 0 | Refills: 0 | Status: DISCONTINUED | OUTPATIENT
Start: 2019-01-23 | End: 2019-01-25

## 2019-01-23 RX ORDER — FENTANYL CITRATE 50 UG/ML
25 INJECTION INTRAVENOUS
Qty: 0 | Refills: 0 | Status: DISCONTINUED | OUTPATIENT
Start: 2019-01-23 | End: 2019-01-23

## 2019-01-23 RX ORDER — DEXTROSE 50 % IN WATER 50 %
15 SYRINGE (ML) INTRAVENOUS ONCE
Qty: 0 | Refills: 0 | Status: DISCONTINUED | OUTPATIENT
Start: 2019-01-23 | End: 2019-01-25

## 2019-01-23 RX ORDER — GABAPENTIN 400 MG/1
300 CAPSULE ORAL ONCE
Qty: 0 | Refills: 0 | Status: COMPLETED | OUTPATIENT
Start: 2019-01-23 | End: 2019-01-23

## 2019-01-23 RX ORDER — ACETAMINOPHEN 500 MG
1000 TABLET ORAL
Qty: 0 | Refills: 0 | Status: DISCONTINUED | OUTPATIENT
Start: 2019-01-23 | End: 2019-01-23

## 2019-01-23 RX ORDER — GLUCAGON INJECTION, SOLUTION 0.5 MG/.1ML
1 INJECTION, SOLUTION SUBCUTANEOUS ONCE
Qty: 0 | Refills: 0 | Status: DISCONTINUED | OUTPATIENT
Start: 2019-01-23 | End: 2019-01-25

## 2019-01-23 RX ORDER — TRANEXAMIC ACID 100 MG/ML
650 INJECTION, SOLUTION INTRAVENOUS ONCE
Qty: 0 | Refills: 0 | Status: DISCONTINUED | OUTPATIENT
Start: 2019-01-23 | End: 2019-01-23

## 2019-01-23 RX ORDER — DEXTROSE 50 % IN WATER 50 %
25 SYRINGE (ML) INTRAVENOUS ONCE
Qty: 0 | Refills: 0 | Status: DISCONTINUED | OUTPATIENT
Start: 2019-01-23 | End: 2019-01-25

## 2019-01-23 RX ORDER — SCOPALAMINE 1 MG/3D
1 PATCH, EXTENDED RELEASE TRANSDERMAL ONCE
Qty: 0 | Refills: 0 | Status: COMPLETED | OUTPATIENT
Start: 2019-01-23 | End: 2019-01-23

## 2019-01-23 RX ORDER — PREGABALIN 225 MG/1
1000 CAPSULE ORAL DAILY
Qty: 0 | Refills: 0 | Status: DISCONTINUED | OUTPATIENT
Start: 2019-01-23 | End: 2019-01-25

## 2019-01-23 RX ORDER — LOSARTAN POTASSIUM 100 MG/1
100 TABLET, FILM COATED ORAL DAILY
Qty: 0 | Refills: 0 | Status: DISCONTINUED | OUTPATIENT
Start: 2019-01-25 | End: 2019-01-25

## 2019-01-23 RX ORDER — MULTIVIT-MIN/FERROUS GLUCONATE 9 MG/15 ML
1 LIQUID (ML) ORAL
Qty: 0 | Refills: 0 | COMMUNITY

## 2019-01-23 RX ORDER — GABAPENTIN 400 MG/1
1 CAPSULE ORAL
Qty: 0 | Refills: 0 | COMMUNITY

## 2019-01-23 RX ORDER — ACETAMINOPHEN 500 MG
1000 TABLET ORAL
Qty: 0 | Refills: 0 | Status: DISCONTINUED | OUTPATIENT
Start: 2019-01-23 | End: 2019-01-25

## 2019-01-23 RX ORDER — THEOPHYLLINE ANHYDROUS 200 MG
1 TABLET, EXTENDED RELEASE 12 HR ORAL
Qty: 0 | Refills: 0 | COMMUNITY

## 2019-01-23 RX ORDER — BUDESONIDE AND FORMOTEROL FUMARATE DIHYDRATE 160; 4.5 UG/1; UG/1
2 AEROSOL RESPIRATORY (INHALATION)
Qty: 0 | Refills: 0 | Status: DISCONTINUED | OUTPATIENT
Start: 2019-01-23 | End: 2019-01-25

## 2019-01-23 RX ORDER — ONDANSETRON 8 MG/1
4 TABLET, FILM COATED ORAL ONCE
Qty: 0 | Refills: 0 | Status: DISCONTINUED | OUTPATIENT
Start: 2019-01-23 | End: 2019-01-23

## 2019-01-23 RX ORDER — POLYETHYLENE GLYCOL 3350 17 G/17G
17 POWDER, FOR SOLUTION ORAL DAILY
Qty: 0 | Refills: 0 | Status: DISCONTINUED | OUTPATIENT
Start: 2019-01-23 | End: 2019-01-25

## 2019-01-23 RX ORDER — ONDANSETRON 8 MG/1
4 TABLET, FILM COATED ORAL EVERY 6 HOURS
Qty: 0 | Refills: 0 | Status: DISCONTINUED | OUTPATIENT
Start: 2019-01-23 | End: 2019-01-25

## 2019-01-23 RX ORDER — GABAPENTIN 400 MG/1
300 CAPSULE ORAL AT BEDTIME
Qty: 0 | Refills: 0 | Status: DISCONTINUED | OUTPATIENT
Start: 2019-01-23 | End: 2019-01-25

## 2019-01-23 RX ORDER — SODIUM CHLORIDE 9 MG/ML
1000 INJECTION, SOLUTION INTRAVENOUS
Qty: 0 | Refills: 0 | Status: DISCONTINUED | OUTPATIENT
Start: 2019-01-23 | End: 2019-01-23

## 2019-01-23 RX ORDER — CELECOXIB 200 MG/1
400 CAPSULE ORAL ONCE
Qty: 0 | Refills: 0 | Status: COMPLETED | OUTPATIENT
Start: 2019-01-23 | End: 2019-01-23

## 2019-01-23 RX ORDER — SODIUM CHLORIDE 9 MG/ML
1000 INJECTION, SOLUTION INTRAVENOUS
Qty: 0 | Refills: 0 | Status: DISCONTINUED | OUTPATIENT
Start: 2019-01-23 | End: 2019-01-25

## 2019-01-23 RX ORDER — CEPHALEXIN 500 MG
500 CAPSULE ORAL
Qty: 0 | Refills: 0 | Status: DISCONTINUED | OUTPATIENT
Start: 2019-01-23 | End: 2019-01-25

## 2019-01-23 RX ORDER — VANCOMYCIN HCL 1 G
1000 VIAL (EA) INTRAVENOUS ONCE
Qty: 0 | Refills: 0 | Status: COMPLETED | OUTPATIENT
Start: 2019-01-23 | End: 2019-01-23

## 2019-01-23 RX ORDER — MECLIZINE HCL 12.5 MG
12.5 TABLET ORAL DAILY
Qty: 0 | Refills: 0 | Status: DISCONTINUED | OUTPATIENT
Start: 2019-01-23 | End: 2019-01-25

## 2019-01-23 RX ORDER — HYDROMORPHONE HYDROCHLORIDE 2 MG/ML
2 INJECTION INTRAMUSCULAR; INTRAVENOUS; SUBCUTANEOUS
Qty: 0 | Refills: 0 | Status: DISCONTINUED | OUTPATIENT
Start: 2019-01-23 | End: 2019-01-25

## 2019-01-23 RX ORDER — ALBUTEROL 90 UG/1
2 AEROSOL, METERED ORAL
Qty: 0 | Refills: 0 | Status: DISCONTINUED | OUTPATIENT
Start: 2019-01-23 | End: 2019-01-25

## 2019-01-23 RX ORDER — FLUTICASONE PROPIONATE AND SALMETEROL 50; 250 UG/1; UG/1
1 POWDER ORAL; RESPIRATORY (INHALATION)
Qty: 0 | Refills: 0 | COMMUNITY

## 2019-01-23 RX ORDER — OXYCODONE HYDROCHLORIDE 5 MG/1
10 TABLET ORAL EVERY 12 HOURS
Qty: 0 | Refills: 0 | Status: DISCONTINUED | OUTPATIENT
Start: 2019-01-23 | End: 2019-01-25

## 2019-01-23 RX ORDER — FENTANYL CITRATE 50 UG/ML
50 INJECTION INTRAVENOUS
Qty: 0 | Refills: 0 | Status: DISCONTINUED | OUTPATIENT
Start: 2019-01-23 | End: 2019-01-23

## 2019-01-23 RX ORDER — HYDROMORPHONE HYDROCHLORIDE 2 MG/ML
2 INJECTION INTRAMUSCULAR; INTRAVENOUS; SUBCUTANEOUS
Qty: 0 | Refills: 0 | Status: DISCONTINUED | OUTPATIENT
Start: 2019-01-23 | End: 2019-01-23

## 2019-01-23 RX ORDER — OXYCODONE HYDROCHLORIDE 5 MG/1
5 TABLET ORAL
Qty: 0 | Refills: 0 | Status: DISCONTINUED | OUTPATIENT
Start: 2019-01-23 | End: 2019-01-25

## 2019-01-23 RX ORDER — LOSARTAN POTASSIUM 100 MG/1
1 TABLET, FILM COATED ORAL
Qty: 0 | Refills: 0 | COMMUNITY

## 2019-01-23 RX ORDER — ACETAMINOPHEN 500 MG
975 TABLET ORAL EVERY 8 HOURS
Qty: 0 | Refills: 0 | Status: DISCONTINUED | OUTPATIENT
Start: 2019-01-23 | End: 2019-01-25

## 2019-01-23 RX ORDER — VANCOMYCIN HCL 1 G
1000 VIAL (EA) INTRAVENOUS
Qty: 0 | Refills: 0 | Status: COMPLETED | OUTPATIENT
Start: 2019-01-23 | End: 2019-01-23

## 2019-01-23 RX ORDER — PREGABALIN 225 MG/1
1 CAPSULE ORAL
Qty: 0 | Refills: 0 | COMMUNITY

## 2019-01-23 RX ADMIN — SCOPALAMINE 1 PATCH: 1 PATCH, EXTENDED RELEASE TRANSDERMAL at 17:37

## 2019-01-23 RX ADMIN — Medication 250 MILLIGRAM(S): at 20:03

## 2019-01-23 RX ADMIN — OXYCODONE HYDROCHLORIDE 10 MILLIGRAM(S): 5 TABLET ORAL at 22:35

## 2019-01-23 RX ADMIN — Medication 300 MILLIGRAM(S): at 20:07

## 2019-01-23 RX ADMIN — GABAPENTIN 300 MILLIGRAM(S): 400 CAPSULE ORAL at 08:15

## 2019-01-23 RX ADMIN — Medication 250 MILLIGRAM(S): at 08:01

## 2019-01-23 RX ADMIN — Medication 975 MILLIGRAM(S): at 21:46

## 2019-01-23 RX ADMIN — Medication 975 MILLIGRAM(S): at 22:40

## 2019-01-23 RX ADMIN — Medication 100 MILLIGRAM(S): at 21:46

## 2019-01-23 RX ADMIN — HYDROMORPHONE HYDROCHLORIDE 2 MILLIGRAM(S): 2 INJECTION INTRAMUSCULAR; INTRAVENOUS; SUBCUTANEOUS at 17:36

## 2019-01-23 RX ADMIN — FENTANYL CITRATE 50 MICROGRAM(S): 50 INJECTION INTRAVENOUS at 15:45

## 2019-01-23 RX ADMIN — Medication 100 MILLIGRAM(S): at 17:37

## 2019-01-23 RX ADMIN — Medication 500 MILLIGRAM(S): at 21:47

## 2019-01-23 RX ADMIN — GABAPENTIN 300 MILLIGRAM(S): 400 CAPSULE ORAL at 21:46

## 2019-01-23 RX ADMIN — SCOPALAMINE 1 PATCH: 1 PATCH, EXTENDED RELEASE TRANSDERMAL at 08:16

## 2019-01-23 RX ADMIN — BUDESONIDE AND FORMOTEROL FUMARATE DIHYDRATE 2 PUFF(S): 160; 4.5 AEROSOL RESPIRATORY (INHALATION) at 20:01

## 2019-01-23 RX ADMIN — SODIUM CHLORIDE 3 MILLILITER(S): 9 INJECTION INTRAMUSCULAR; INTRAVENOUS; SUBCUTANEOUS at 21:49

## 2019-01-23 RX ADMIN — FENTANYL CITRATE 50 MICROGRAM(S): 50 INJECTION INTRAVENOUS at 15:15

## 2019-01-23 RX ADMIN — OXYCODONE HYDROCHLORIDE 10 MILLIGRAM(S): 5 TABLET ORAL at 21:47

## 2019-01-23 NOTE — BRIEF OPERATIVE NOTE - PROCEDURE
<<-----Click on this checkbox to enter Procedure Total hip arthroplasty by direct anterior approach  01/23/2019  Right  Active  ALL

## 2019-01-23 NOTE — CONSULT NOTE ADULT - ASSESSMENT
78 year old female Anemia, Asthma, Colon polyps, Diabetes mellitus, Fibromyalgia, GERD, Glaucoma, Hypertension, Neuropathy, Osteoarthritis  hip, Vertigo, arthritis of the hip, has hip pain that radiates down leg, uses cane for ambulation, pain with walking and sleep, she takes tylenol for pain little relief, she came in today for Right total hip replacement, s/p procedure post op day 0.     Plan:   OA of right hip s/p RTHR: Pain meds, DVT prophylaxis, Perioperative antibiotics as per Primary team, PT eval, meds to prevent constipation, Incentive spirometry, will continue with IV fluids.    HTN:  Will continue with losartan 100 mg once a day with holding parameters.    DM: FS monitoring and coverage insulin, will hold metformin.     Fibromyalgia: Will continue with gabapentin 300 mg once a day (at bedtime)      GERD: Protonix 40mg daily while here in the hospital     Hx of Asthma: Will continue with fluticasone-salmeterol 100 mcg-50 mcg inhalation 1 puff(s) inhaled 2 times a day, theophylline 300 mg daily, Proventil HFA 90 mcg/inh inhalation 2 puff(s) inhaled 4 times a day, As Needed - for shortness of breath and/or wheezing, will also put her on duo nebs as needed.     Hx of Vertigo: Will continue with meclizine 12.5 mg once a day, As Needed    DVT prophylaxis as Ortho Service.

## 2019-01-23 NOTE — DISCHARGE NOTE ADULT - HOSPITAL COURSE
The patient underwent a RIGHT ANTERIOR TOTAL HIP REPLACEMENT on 1/23/2019. The patient received antibiotics consistent with SCIP guidelines. The patient underwent the procedure and had no intra-operative complications. Post-operatively, the patient was seen by medicine and PT. The patient received LOVENOX for DVTP. The patient received pain medications per orthopedic pain management protocol and the pain was appropriately controlled. Patient was instructed on anterior total hip precautions by PT. The patient did not have any post-operative medical complications. The patient was discharged in stable condition.

## 2019-01-23 NOTE — DISCHARGE NOTE ADULT - PLAN OF CARE
Decrease Pain, Improve Ambulation and Activities of Daily Living The patient will be seen in the office in 2 weeks for wound check. PLEASE CONTACT OFFICE TO ARRANGE FOLLOW-UP APPOINTMENT DATE. Patient may shower after post-op day #5. The dressing is to be removed on post-op day #9(2/1/2019).  IF THE DRESSING BECOMES SOILED BEFORE THE REMOVAL DATE, CHANGE WITH A SIMILAR DRESSING. IF THE DRESSING BECOMES STAINED WITH DISCHARGE, CONTACT THE OFFICE FOR FURTHER DIRECTIONS.  The patient will contact the office if the wound becomes red, has increasing pain, develops bleeding or discharge, an injury occurs, or has other concerns. The patient will continue PT consistent with anterior total hip replacement protocol. The patient will continue to practice anterior total hip precautions for a minimum of 6 week.  The patient will continue LOVENOX for 2 weeks and then begin ASPIRIN 325mg TWICE daily for 4 weeks for prevention of blood clots. The patient will take Celebrex 3 weeks for the prevention of heterotopic bone formation. The patient will take OXYCODONE AND TYLENOL for pain control and titrate according to prescription and patient needs. CONNOR was prescribe for the patient to take for 7 days following surgery.  The patient will take SENNA-S while taking oxycodone to prevent narcotic associated constipation.  Additionally, increase water intake (drink at least 8 glasses of water daily) and try adding fiber to the diet by eating fruits, vegetables and foods that are rich in grains. If constipation is experienced, contact the medical/primary care provider to discuss further treatment options. The patient is FULL weight bearing. The patient will be seen in the office in 2 weeks for wound check. PLEASE CONTACT OFFICE TO ARRANGE FOLLOW-UP APPOINTMENT DATE. Patient may shower after post-op day #5. The dressing is to be removed on post-op day #9(2/1/2019).  IF THE DRESSING BECOMES SOILED BEFORE THE REMOVAL DATE, CHANGE WITH A SIMILAR DRESSING. IF THE DRESSING BECOMES STAINED WITH DISCHARGE, CONTACT THE OFFICE FOR FURTHER DIRECTIONS.  The patient will contact the office if the wound becomes red, has increasing pain, develops bleeding or discharge, an injury occurs, or has other concerns. The patient will continue PT consistent with anterior total hip replacement protocol. The patient will continue to practice anterior total hip precautions for a minimum of 6 week.  The patient will continue LOVENOX for 4 weeks. The patient will take Celebrex 3 weeks for the prevention of heterotopic bone formation. The patient will take OXYCODONE AND TYLENOL for pain control and titrate according to prescription and patient needs. DURICEF was prescribe for the patient to take for 7 days following surgery.  The patient will take SENNA-S while taking oxycodone to prevent narcotic associated constipation.  Additionally, increase water intake (drink at least 8 glasses of water daily) and try adding fiber to the diet by eating fruits, vegetables and foods that are rich in grains. If constipation is experienced, contact the medical/primary care provider to discuss further treatment options. The patient is FULL weight bearing.

## 2019-01-23 NOTE — DISCHARGE NOTE ADULT - NS AS ACTIVITY OBS
Do not make important decisions/Do not drive or operate machinery/No Heavy lifting/straining/Showering allowed/Walking-Outdoors allowed/Stairs allowed/Walking-Indoors allowed

## 2019-01-23 NOTE — DISCHARGE NOTE ADULT - CARE PLAN
Principal Discharge DX:	Primary osteoarthritis of right hip  Goal:	Decrease Pain, Improve Ambulation and Activities of Daily Living  Assessment and plan of treatment:	The patient will be seen in the office in 2 weeks for wound check. PLEASE CONTACT OFFICE TO ARRANGE FOLLOW-UP APPOINTMENT DATE. Patient may shower after post-op day #5. The dressing is to be removed on post-op day #9(2/1/2019).  IF THE DRESSING BECOMES SOILED BEFORE THE REMOVAL DATE, CHANGE WITH A SIMILAR DRESSING. IF THE DRESSING BECOMES STAINED WITH DISCHARGE, CONTACT THE OFFICE FOR FURTHER DIRECTIONS.  The patient will contact the office if the wound becomes red, has increasing pain, develops bleeding or discharge, an injury occurs, or has other concerns. The patient will continue PT consistent with anterior total hip replacement protocol. The patient will continue to practice anterior total hip precautions for a minimum of 6 week.  The patient will continue LOVENOX for 2 weeks and then begin ASPIRIN 325mg TWICE daily for 4 weeks for prevention of blood clots. The patient will take Celebrex 3 weeks for the prevention of heterotopic bone formation. The patient will take OXYCODONE AND TYLENOL for pain control and titrate according to prescription and patient needs. DURICEF was prescribe for the patient to take for 7 days following surgery.  The patient will take SENNA-S while taking oxycodone to prevent narcotic associated constipation.  Additionally, increase water intake (drink at least 8 glasses of water daily) and try adding fiber to the diet by eating fruits, vegetables and foods that are rich in grains. If constipation is experienced, contact the medical/primary care provider to discuss further treatment options. The patient is FULL weight bearing. Principal Discharge DX:	Primary osteoarthritis of right hip  Goal:	Decrease Pain, Improve Ambulation and Activities of Daily Living  Assessment and plan of treatment:	The patient will be seen in the office in 2 weeks for wound check. PLEASE CONTACT OFFICE TO ARRANGE FOLLOW-UP APPOINTMENT DATE. Patient may shower after post-op day #5. The dressing is to be removed on post-op day #9(2/1/2019).  IF THE DRESSING BECOMES SOILED BEFORE THE REMOVAL DATE, CHANGE WITH A SIMILAR DRESSING. IF THE DRESSING BECOMES STAINED WITH DISCHARGE, CONTACT THE OFFICE FOR FURTHER DIRECTIONS.  The patient will contact the office if the wound becomes red, has increasing pain, develops bleeding or discharge, an injury occurs, or has other concerns. The patient will continue PT consistent with anterior total hip replacement protocol. The patient will continue to practice anterior total hip precautions for a minimum of 6 week.  The patient will continue LOVENOX for 4 weeks. The patient will take Celebrex 3 weeks for the prevention of heterotopic bone formation. The patient will take OXYCODONE AND TYLENOL for pain control and titrate according to prescription and patient needs. DURICEF was prescribe for the patient to take for 7 days following surgery.  The patient will take SENNA-S while taking oxycodone to prevent narcotic associated constipation.  Additionally, increase water intake (drink at least 8 glasses of water daily) and try adding fiber to the diet by eating fruits, vegetables and foods that are rich in grains. If constipation is experienced, contact the medical/primary care provider to discuss further treatment options. The patient is FULL weight bearing.

## 2019-01-23 NOTE — DISCHARGE NOTE ADULT - PATIENT PORTAL LINK FT
You can access the [facility] Patient Portal, offered by [facility], by registering with the following website: [email or web address]

## 2019-01-23 NOTE — PHYSICAL THERAPY INITIAL EVALUATION ADULT - GAIT PATTERN USED, PT EVAL
decreased gait velocity and activity tolerance, ambulation distance limited due to pain and fatigue, contact assist for safety

## 2019-01-23 NOTE — PHYSICAL THERAPY INITIAL EVALUATION ADULT - CRITERIA FOR SKILLED THERAPEUTIC INTERVENTIONS
predicted duration of therapy intervention/rehab potential/anticipated discharge recommendation/impairments found/therapy frequency/functional limitations in following categories

## 2019-01-23 NOTE — CONSULT NOTE ADULT - SUBJECTIVE AND OBJECTIVE BOX
78 year old female Anemia, Asthma, Colon polyps, Diabetes mellitus, Fibromyalgia, GERD, Glaucoma, Hypertension, Neuropathy, Osteoarthritis  hip, Vertigo, arthritis of the hip, has hip pain that radiates down leg, uses cane for ambulation, pain with walking and sleep, she takes tylenol for pain little relief, she came in today for Right total hip replacement, s/p procedure post op day 0, she tolerated the procedure, pain in hip is well controlled, denies fever, chills, chest pain, sob, nausea, vomiting or dizziness.     ROS:   CONSTITUTIONAL: No fever, some fatigue  RESPIRATORY: No cough, No shortness of breath  CARDIOVASCULAR: No chest pain, palpitations  GASTROINTESTINAL: No abdominal, no nausea, no vomiting  NEUROLOGICAL: No headaches, loss of strength  MISCELLANEOUS: Right hip pain well controlled  All systems reviewed negative except mentioned above.            Allergies/Medications:   Allergies:        Allergies:  	morphine: Drug, Unknown    Home Medications:   * Incomplete Medication History as of 03-Jan-2019 08:43 documented in Structured Notes  · 	Tylenol 325 mg oral tablet: Last Dose Taken:  , 3 tab(s) orally every 8 hours for supplemental pain control  · 	fluticasone-salmeterol 100 mcg-50 mcg inhalation powder: Last Dose Taken:  , 1 puff(s) inhaled 2 times a day  · 	meclizine 12.5 mg oral tablet: Last Dose Taken:  , 1 tab(s) orally once a day, As Needed  · 	losartan 100 mg oral tablet: Last Dose Taken:  , 1 tab(s) orally once a day  · 	theophylline 300 mg oral tablet, extended release: Last Dose Taken:  , 1 tab(s) orally every 12 hours  · 	Proventil HFA 90 mcg/inh inhalation aerosol: Last Dose Taken:  , 2 puff(s) inhaled 4 times a day, As Needed - for shortness of breath and/or wheezing  · 	Centrum Silver oral tablet: Last Dose Taken:  , 1 tab(s) orally once a day  · 	Vitamin B-12 1000 mcg oral tablet: Last Dose Taken:  , 1 tab(s) orally once a day  · 	Calcium 600+D oral tablet: Last Dose Taken:  , 1 tab(s) orally once a day  · 	gabapentin 300 mg oral capsule: Last Dose Taken:  , 1 cap(s) orally once a day (at bedtime)  · 	metFORMIN 500 mg oral tablet: Last Dose Taken:  , 1 tab(s) orally once a day (at bedtime)    PMH/PSH/FH/SH:    Past Medical History:  Anemia    Asthma    Colon polyps    Diabetes mellitus    Fibromyalgia    GERD (gastroesophageal reflux disease)    Glaucoma    Hypertension    Neuropathy    Osteoarthritis  hip  Vertigo.     Past Surgical History:  H/O total knee replacement  bilateral 2017  History of bilateral cataract extraction    History of carpal tunnel surgery of left wrist    History of hysterectomy    History of left knee replacement    S/P tonsillectomy and adenoidectomy.     Family History:  Mother  Still living? No  Family history of bone cancer, Age at diagnosis: 71-80     Father  Still living? No  Family history of prostate cancer, Age at diagnosis:   Family history of dementia, Age at diagnosis: Age Unknown  Family history of hypertension, Age at diagnosis: Age Unknown     Sibling  Still living? No  Family history of AIDS, Age at diagnosis: 21-30     Child  Still living? Yes, Estimated age: Age Unknown  Family history of cerebral palsy, Age at diagnosis: Age Unknown.     Social History:  · Marital Status	  · Occupation	retired  · Lives With	spouse     Substance Use History:  · Substance Use	caffeine  denies drug use  · Caffeine Type	coffee  · Caffeine Amount/Frequency	1-2 cups/cans per day     Alcohol Use History:  · Have you ever consumed alcohol	yes...  · Alcohol Type	wine  · Alcohol Frequency	monthly or less  · Alcohol Amount	1-2 drinks  · Problems Related to Alcohol Use	no  · Alcohol Use Comment	rarely drinks  history social  · 1. Have you felt you ought to CUT down on your drinking?	no  · 2. Have people ANNOYED you by criticizing your drinking?	no  · 3. Have you ever felt bad or GUILTY about your drinking?	no  · 4. Have you ever needed an "EYE OPENER", a drink first thing in the morning to steady your nerves or get rid of a hangover?	no     Tobacco Usage:  · Tobacco Usage: Former smoker  · Tobacco Type: cigarettes  · Number of Packs per Day: 1  · Number of yrs: 40  · Pack yrs: 40  · Longest Period Tobacco-Free: quit 2005     Passive Smoke Exposure:  · Passive Smoke Exposure	No    Vital Signs Last 24 Hrs  T(C): 36.1 (23 Jan 2019 12:12), Max: 37.3 (23 Jan 2019 07:28)  T(F): 97 (23 Jan 2019 12:12), Max: 99.1 (23 Jan 2019 07:28)  HR: 52 (23 Jan 2019 14:00) (50 - 98)  BP: 138/59 (23 Jan 2019 14:00) (120/99 - 140/62)  RR: 19 (23 Jan 2019 14:00) (12 - 20)  SpO2: 100% (23 Jan 2019 14:00) (100% - 100%)    PHYSICAL EXAM:    GENERAL: Elderly female looking comfortable  HEENT: PERRL, +EOMI  NECK: soft, Supple, No JVD, 	  CHEST/LUNG: Clear to auscultate bilaterally; No wheezing  HEART: S1S2+, Regular rate and rhythm; No murmurs  ABDOMEN: Soft, Non tender, Nondistended; Bowel sounds present  EXTREMITIES:  2+ Peripheral Pulses, No edema, Right hip surgical wound with dressings on, no bleeding or soaking.   SKIN: No rashes or lesions  NEURO: AAOX3, no focal deficits, no motor r sensory loss  PSYCH: normal mood

## 2019-01-23 NOTE — DISCHARGE NOTE ADULT - CARE PROVIDER_API CALL
Salo Sewell (MD), Orthopaedic Surgery  [address]  [place] [address]  Phone: [phone]  Fax: [phone]

## 2019-01-23 NOTE — DISCHARGE NOTE ADULT - MEDICATION SUMMARY - MEDICATIONS TO TAKE
I will START or STAY ON the medications listed below when I get home from the hospital:    oxyCODONE 5 mg oral tablet  -- 1-2  tab(s) by mouth every 4 to 6 hours MDD:8   -- Caution federal law prohibits the transfer of this drug to any person other  than the person for whom it was prescribed.  It is very important that you take or use this exactly as directed.  Do not skip doses or discontinue unless directed by your doctor.  May cause drowsiness.  Alcohol may intensify this effect.  Use care when operating dangerous machinery.  This prescription cannot be refilled.  Using more of this medication than prescribed may cause serious breathing problems.    -- Indication: For Pain    Aspirin Enteric Coated 325 mg oral delayed release tablet  -- 1 tab(s) by mouth 2 times a day MDD:2  -- Swallow whole.  Do not crush.  Take with food or milk.    -- Indication: For dvt prophylaxis for 4 weeks once lovenox is completed (2/7/19-3/7/19)    losartan 100 mg oral tablet  -- 1 tab(s) by mouth once a day  -- Indication: For Home    Lovenox 40 mg/0.4 mL injectable solution  -- 40 milligram(s) subcutaneously once a day MDD:1  -- It is very important that you take or use this exactly as directed.  Do not skip doses or discontinue unless directed by your doctor.    -- Indication: For dvt prophylaxis for first 2 weeks (1/24/19-2/6/19) followed by 4 weeks of aspirin    gabapentin 300 mg oral capsule  -- 1 cap(s) by mouth once a day (at bedtime)  -- Indication: For Home    metFORMIN 500 mg oral tablet  -- 1 tab(s) by mouth once a day (at bedtime)  -- Indication: For Home    meclizine 12.5 mg oral tablet  -- 1 tab(s) by mouth once a day, As Needed  -- Indication: For Home    theophylline 300 mg oral tablet, extended release  -- 1 tab(s) by mouth every 12 hours  -- Indication: For Home    Proventil HFA 90 mcg/inh inhalation aerosol  -- 2 puff(s) inhaled 4 times a day, As Needed - for shortness of breath and/or wheezing  -- Indication: For Home    fluticasone-salmeterol 100 mcg-50 mcg inhalation powder  -- 1 puff(s) inhaled 2 times a day  -- Indication: For Home    cefadroxil 500 mg oral capsule  -- 1 cap(s) by mouth 2 times a day MDD:2  -- Finish all this medication unless otherwise directed by prescriber.    -- Indication: For infection prevention, two times per day for 7 days (1/24/19-1/30/19)    Senna S 50 mg-8.6 mg oral tablet  -- 2 tab(s) by mouth once a day (at bedtime) MDD:2  -- Medication should be taken with plenty of water.    -- Indication: For constipation    Centrum Silver oral tablet  -- 1 tab(s) by mouth once a day  -- Indication: For Home    Calcium 600+D oral tablet  -- 1 tab(s) by mouth once a day  -- Indication: For Home    Vitamin B-12 1000 mcg oral tablet  -- 1 tab(s) by mouth once a day  -- Indication: For Home I will START or STAY ON the medications listed below when I get home from the hospital:    oxyCODONE 5 mg oral tablet  -- 1-2  tab(s) by mouth every 4 to 6 hours MDD:8   -- Caution federal law prohibits the transfer of this drug to any person other  than the person for whom it was prescribed.  It is very important that you take or use this exactly as directed.  Do not skip doses or discontinue unless directed by your doctor.  May cause drowsiness.  Alcohol may intensify this effect.  Use care when operating dangerous machinery.  This prescription cannot be refilled.  Using more of this medication than prescribed may cause serious breathing problems.    -- Indication: For Pain    Aspirin Enteric Coated 325 mg oral delayed release tablet  -- 1 tab(s) by mouth 2 times a day MDD:2  -- Swallow whole.  Do not crush.  Take with food or milk.    -- Indication: For dvt prophylaxis for 4 weeks once lovenox is completed (2/7/19-3/7/19)    CeleBREX 200 mg oral capsule  -- 1 cap(s) by mouth 2 times a day MDD:2  -- Do not take this drug if you are pregnant.  Medication should be taken with plenty of water.  Obtain medical advice before taking any non-prescription drugs as some may affect the action of this medication.  Take with food or milk.    -- Indication: For HO prevention (3 weeks duration)    losartan 100 mg oral tablet  -- 1 tab(s) by mouth once a day  -- Indication: For Home    Lovenox 40 mg/0.4 mL injectable solution  -- 40 milligram(s) subcutaneously once a day MDD:1  -- It is very important that you take or use this exactly as directed.  Do not skip doses or discontinue unless directed by your doctor.    -- Indication: For dvt prophylaxis for first 2 weeks (1/24/19-2/6/19) followed by 4 weeks of aspirin    gabapentin 300 mg oral capsule  -- 1 cap(s) by mouth once a day (at bedtime)  -- Indication: For Home    metFORMIN 500 mg oral tablet  -- 1 tab(s) by mouth once a day (at bedtime)  -- Indication: For Home    meclizine 12.5 mg oral tablet  -- 1 tab(s) by mouth once a day, As Needed  -- Indication: For Home    theophylline 300 mg oral tablet, extended release  -- 1 tab(s) by mouth every 12 hours  -- Indication: For Home    Proventil HFA 90 mcg/inh inhalation aerosol  -- 2 puff(s) inhaled 4 times a day, As Needed - for shortness of breath and/or wheezing  -- Indication: For Home    fluticasone-salmeterol 100 mcg-50 mcg inhalation powder  -- 1 puff(s) inhaled 2 times a day  -- Indication: For Home    cefadroxil 500 mg oral capsule  -- 1 cap(s) by mouth 2 times a day MDD:2  -- Finish all this medication unless otherwise directed by prescriber.    -- Indication: For infection prevention, two times per day for 7 days (1/24/19-1/30/19)    Senna S 50 mg-8.6 mg oral tablet  -- 2 tab(s) by mouth once a day (at bedtime) MDD:2  -- Medication should be taken with plenty of water.    -- Indication: For constipation    Centrum Silver oral tablet  -- 1 tab(s) by mouth once a day  -- Indication: For Home    Calcium 600+D oral tablet  -- 1 tab(s) by mouth once a day  -- Indication: For Home    Vitamin B-12 1000 mcg oral tablet  -- 1 tab(s) by mouth once a day  -- Indication: For Home I will START or STAY ON the medications listed below when I get home from the hospital:    oxyCODONE 5 mg oral tablet  -- 1-2  tab(s) by mouth every 4 to 6 hours MDD:8   -- Caution federal law prohibits the transfer of this drug to any person other  than the person for whom it was prescribed.  It is very important that you take or use this exactly as directed.  Do not skip doses or discontinue unless directed by your doctor.  May cause drowsiness.  Alcohol may intensify this effect.  Use care when operating dangerous machinery.  This prescription cannot be refilled.  Using more of this medication than prescribed may cause serious breathing problems.    -- Indication: For Pain    CeleBREX 200 mg oral capsule  -- 1 cap(s) by mouth 2 times a day MDD:2  -- Do not take this drug if you are pregnant.  Medication should be taken with plenty of water.  Obtain medical advice before taking any non-prescription drugs as some may affect the action of this medication.  Take with food or milk.    -- Indication: For HO prevention (3 weeks duration)    losartan 100 mg oral tablet  -- 1 tab(s) by mouth once a day  -- Indication: For Home    Lovenox 40 mg/0.4 mL injectable solution  -- 40 milligram(s) subcutaneously once a day MDD:1  -- It is very important that you take or use this exactly as directed.  Do not skip doses or discontinue unless directed by your doctor.    -- Indication: For DVTP    gabapentin 300 mg oral capsule  -- 1 cap(s) by mouth once a day (at bedtime)  -- Indication: For Home    metFORMIN 500 mg oral tablet  -- 1 tab(s) by mouth once a day (at bedtime)  -- Indication: For Home    meclizine 12.5 mg oral tablet  -- 1 tab(s) by mouth once a day, As Needed  -- Indication: For Home    theophylline 300 mg oral tablet, extended release  -- 1 tab(s) by mouth every 12 hours  -- Indication: For Home    Proventil HFA 90 mcg/inh inhalation aerosol  -- 2 puff(s) inhaled 4 times a day, As Needed - for shortness of breath and/or wheezing  -- Indication: For Home    fluticasone-salmeterol 100 mcg-50 mcg inhalation powder  -- 1 puff(s) inhaled 2 times a day  -- Indication: For Home    cefadroxil 500 mg oral capsule  -- 1 cap(s) by mouth 2 times a day MDD:2  -- Finish all this medication unless otherwise directed by prescriber.    -- Indication: For infection prevention, two times per day for 7 days (1/24/19-1/30/19)    Senna S 50 mg-8.6 mg oral tablet  -- 2 tab(s) by mouth once a day (at bedtime) MDD:2  -- Medication should be taken with plenty of water.    -- Indication: For constipation    Centrum Silver oral tablet  -- 1 tab(s) by mouth once a day  -- Indication: For Home    Calcium 600+D oral tablet  -- 1 tab(s) by mouth once a day  -- Indication: For Home    Vitamin B-12 1000 mcg oral tablet  -- 1 tab(s) by mouth once a day  -- Indication: For Home

## 2019-01-24 LAB
ANION GAP SERPL CALC-SCNC: 13 MMOL/L — SIGNIFICANT CHANGE UP (ref 5–17)
BUN SERPL-MCNC: 7 MG/DL — LOW (ref 8–20)
CALCIUM SERPL-MCNC: 8.7 MG/DL — SIGNIFICANT CHANGE UP (ref 8.6–10.2)
CHLORIDE SERPL-SCNC: 103 MMOL/L — SIGNIFICANT CHANGE UP (ref 98–107)
CO2 SERPL-SCNC: 24 MMOL/L — SIGNIFICANT CHANGE UP (ref 22–29)
CREAT SERPL-MCNC: 0.39 MG/DL — LOW (ref 0.5–1.3)
GLUCOSE BLDC GLUCOMTR-MCNC: 130 MG/DL — HIGH (ref 70–99)
GLUCOSE BLDC GLUCOMTR-MCNC: 151 MG/DL — HIGH (ref 70–99)
GLUCOSE BLDC GLUCOMTR-MCNC: 153 MG/DL — HIGH (ref 70–99)
GLUCOSE BLDC GLUCOMTR-MCNC: 160 MG/DL — HIGH (ref 70–99)
GLUCOSE BLDC GLUCOMTR-MCNC: 161 MG/DL — HIGH (ref 70–99)
GLUCOSE SERPL-MCNC: 121 MG/DL — HIGH (ref 70–115)
HCT VFR BLD CALC: 37.3 % — SIGNIFICANT CHANGE UP (ref 37–47)
HGB BLD-MCNC: 12.4 G/DL — SIGNIFICANT CHANGE UP (ref 12–16)
MCHC RBC-ENTMCNC: 31.2 PG — HIGH (ref 27–31)
MCHC RBC-ENTMCNC: 33.2 G/DL — SIGNIFICANT CHANGE UP (ref 32–36)
MCV RBC AUTO: 93.7 FL — SIGNIFICANT CHANGE UP (ref 81–99)
PLATELET # BLD AUTO: 214 K/UL — SIGNIFICANT CHANGE UP (ref 150–400)
POTASSIUM SERPL-MCNC: 3.9 MMOL/L — SIGNIFICANT CHANGE UP (ref 3.5–5.3)
POTASSIUM SERPL-SCNC: 3.9 MMOL/L — SIGNIFICANT CHANGE UP (ref 3.5–5.3)
RBC # BLD: 3.98 M/UL — LOW (ref 4.4–5.2)
RBC # FLD: 15.3 % — SIGNIFICANT CHANGE UP (ref 11–15.6)
SODIUM SERPL-SCNC: 140 MMOL/L — SIGNIFICANT CHANGE UP (ref 135–145)
WBC # BLD: 10.3 K/UL — SIGNIFICANT CHANGE UP (ref 4.8–10.8)
WBC # FLD AUTO: 10.3 K/UL — SIGNIFICANT CHANGE UP (ref 4.8–10.8)

## 2019-01-24 PROCEDURE — 99232 SBSQ HOSP IP/OBS MODERATE 35: CPT

## 2019-01-24 RX ORDER — ASPIRIN/CALCIUM CARB/MAGNESIUM 324 MG
1 TABLET ORAL
Qty: 56 | Refills: 0 | OUTPATIENT
Start: 2019-01-24 | End: 2019-02-20

## 2019-01-24 RX ORDER — OXYCODONE HYDROCHLORIDE 5 MG/1
1 TABLET ORAL
Qty: 42 | Refills: 0 | OUTPATIENT
Start: 2019-01-24 | End: 2019-01-30

## 2019-01-24 RX ORDER — ENOXAPARIN SODIUM 100 MG/ML
40 INJECTION SUBCUTANEOUS
Qty: 14 | Refills: 0 | OUTPATIENT
Start: 2019-01-24 | End: 2019-02-06

## 2019-01-24 RX ORDER — SENNOSIDES/DOCUSATE SODIUM 8.6MG-50MG
2 TABLET ORAL
Qty: 14 | Refills: 0 | OUTPATIENT
Start: 2019-01-24 | End: 2019-01-30

## 2019-01-24 RX ORDER — THEOPHYLLINE ANHYDROUS 200 MG
300 TABLET, EXTENDED RELEASE 12 HR ORAL
Qty: 0 | Refills: 0 | Status: DISCONTINUED | OUTPATIENT
Start: 2019-01-24 | End: 2019-01-25

## 2019-01-24 RX ORDER — CELECOXIB 200 MG/1
1 CAPSULE ORAL
Qty: 42 | Refills: 0
Start: 2019-01-24 | End: 2019-02-13

## 2019-01-24 RX ADMIN — BUDESONIDE AND FORMOTEROL FUMARATE DIHYDRATE 2 PUFF(S): 160; 4.5 AEROSOL RESPIRATORY (INHALATION) at 09:55

## 2019-01-24 RX ADMIN — GABAPENTIN 300 MILLIGRAM(S): 400 CAPSULE ORAL at 20:42

## 2019-01-24 RX ADMIN — Medication 500 MILLIGRAM(S): at 20:41

## 2019-01-24 RX ADMIN — BUDESONIDE AND FORMOTEROL FUMARATE DIHYDRATE 2 PUFF(S): 160; 4.5 AEROSOL RESPIRATORY (INHALATION) at 20:56

## 2019-01-24 RX ADMIN — SCOPALAMINE 1 PATCH: 1 PATCH, EXTENDED RELEASE TRANSDERMAL at 20:49

## 2019-01-24 RX ADMIN — OXYCODONE HYDROCHLORIDE 10 MILLIGRAM(S): 5 TABLET ORAL at 06:02

## 2019-01-24 RX ADMIN — OXYCODONE HYDROCHLORIDE 10 MILLIGRAM(S): 5 TABLET ORAL at 17:49

## 2019-01-24 RX ADMIN — OXYCODONE HYDROCHLORIDE 10 MILLIGRAM(S): 5 TABLET ORAL at 21:30

## 2019-01-24 RX ADMIN — OXYCODONE HYDROCHLORIDE 10 MILLIGRAM(S): 5 TABLET ORAL at 05:08

## 2019-01-24 RX ADMIN — OXYCODONE HYDROCHLORIDE 10 MILLIGRAM(S): 5 TABLET ORAL at 14:15

## 2019-01-24 RX ADMIN — ENOXAPARIN SODIUM 40 MILLIGRAM(S): 100 INJECTION SUBCUTANEOUS at 07:56

## 2019-01-24 RX ADMIN — Medication 300 MILLIGRAM(S): at 08:01

## 2019-01-24 RX ADMIN — OXYCODONE HYDROCHLORIDE 10 MILLIGRAM(S): 5 TABLET ORAL at 17:50

## 2019-01-24 RX ADMIN — Medication 100 MILLIGRAM(S): at 20:42

## 2019-01-24 RX ADMIN — SODIUM CHLORIDE 3 MILLILITER(S): 9 INJECTION INTRAMUSCULAR; INTRAVENOUS; SUBCUTANEOUS at 11:13

## 2019-01-24 RX ADMIN — Medication 100 MILLIGRAM(S): at 01:32

## 2019-01-24 RX ADMIN — Medication 500 MILLIGRAM(S): at 05:08

## 2019-01-24 RX ADMIN — SODIUM CHLORIDE 3 MILLILITER(S): 9 INJECTION INTRAMUSCULAR; INTRAVENOUS; SUBCUTANEOUS at 05:09

## 2019-01-24 RX ADMIN — Medication 975 MILLIGRAM(S): at 05:08

## 2019-01-24 RX ADMIN — SCOPALAMINE 1 PATCH: 1 PATCH, EXTENDED RELEASE TRANSDERMAL at 07:10

## 2019-01-24 RX ADMIN — Medication 100 MILLIGRAM(S): at 11:19

## 2019-01-24 RX ADMIN — OXYCODONE HYDROCHLORIDE 10 MILLIGRAM(S): 5 TABLET ORAL at 20:56

## 2019-01-24 RX ADMIN — Medication 500 MILLIGRAM(S): at 11:18

## 2019-01-24 RX ADMIN — Medication 100 MILLIGRAM(S): at 05:08

## 2019-01-24 RX ADMIN — SODIUM CHLORIDE 3 MILLILITER(S): 9 INJECTION INTRAMUSCULAR; INTRAVENOUS; SUBCUTANEOUS at 21:01

## 2019-01-24 RX ADMIN — OXYCODONE HYDROCHLORIDE 10 MILLIGRAM(S): 5 TABLET ORAL at 13:43

## 2019-01-24 RX ADMIN — Medication 975 MILLIGRAM(S): at 21:15

## 2019-01-24 RX ADMIN — PREGABALIN 1000 MICROGRAM(S): 225 CAPSULE ORAL at 11:17

## 2019-01-24 RX ADMIN — Medication 975 MILLIGRAM(S): at 20:42

## 2019-01-24 RX ADMIN — Medication 975 MILLIGRAM(S): at 06:02

## 2019-01-24 RX ADMIN — Medication 2: at 17:56

## 2019-01-24 NOTE — OCCUPATIONAL THERAPY INITIAL EVALUATION ADULT - SENSORY TESTS
pt denies changes with sensation; pt with +right pedal pulses; pt with +capillary refill in right toes

## 2019-01-24 NOTE — OCCUPATIONAL THERAPY INITIAL EVALUATION ADULT - ADDITIONAL COMMENTS
Pt lives in house with no ANNIA and 12 steps inside up to bedroom and bathroom. Bathroom has bathtub with walk-in door cutout and doors. Pt owns RW, cane, commode. Pt is right handed. Pt drives. Pt's  is able and available to assist upon discharge.

## 2019-01-25 VITALS
HEART RATE: 88 BPM | OXYGEN SATURATION: 93 % | SYSTOLIC BLOOD PRESSURE: 126 MMHG | TEMPERATURE: 99 F | RESPIRATION RATE: 20 BRPM | DIASTOLIC BLOOD PRESSURE: 65 MMHG

## 2019-01-25 LAB
ANION GAP SERPL CALC-SCNC: 10 MMOL/L — SIGNIFICANT CHANGE UP (ref 5–17)
BUN SERPL-MCNC: 8 MG/DL — SIGNIFICANT CHANGE UP (ref 8–20)
CALCIUM SERPL-MCNC: 8.8 MG/DL — SIGNIFICANT CHANGE UP (ref 8.6–10.2)
CHLORIDE SERPL-SCNC: 106 MMOL/L — SIGNIFICANT CHANGE UP (ref 98–107)
CO2 SERPL-SCNC: 26 MMOL/L — SIGNIFICANT CHANGE UP (ref 22–29)
CREAT SERPL-MCNC: 0.51 MG/DL — SIGNIFICANT CHANGE UP (ref 0.5–1.3)
GLUCOSE BLDC GLUCOMTR-MCNC: 115 MG/DL — HIGH (ref 70–99)
GLUCOSE BLDC GLUCOMTR-MCNC: 117 MG/DL — HIGH (ref 70–99)
GLUCOSE SERPL-MCNC: 123 MG/DL — HIGH (ref 70–115)
HCT VFR BLD CALC: 34 % — LOW (ref 37–47)
HGB BLD-MCNC: 11.5 G/DL — LOW (ref 12–16)
MCHC RBC-ENTMCNC: 31.8 PG — HIGH (ref 27–31)
MCHC RBC-ENTMCNC: 33.8 G/DL — SIGNIFICANT CHANGE UP (ref 32–36)
MCV RBC AUTO: 93.9 FL — SIGNIFICANT CHANGE UP (ref 81–99)
PLATELET # BLD AUTO: 270 K/UL — SIGNIFICANT CHANGE UP (ref 150–400)
POTASSIUM SERPL-MCNC: 3.5 MMOL/L — SIGNIFICANT CHANGE UP (ref 3.5–5.3)
POTASSIUM SERPL-SCNC: 3.5 MMOL/L — SIGNIFICANT CHANGE UP (ref 3.5–5.3)
RBC # BLD: 3.62 M/UL — LOW (ref 4.4–5.2)
RBC # FLD: 14.9 % — SIGNIFICANT CHANGE UP (ref 11–15.6)
SODIUM SERPL-SCNC: 142 MMOL/L — SIGNIFICANT CHANGE UP (ref 135–145)
WBC # BLD: 12.5 K/UL — HIGH (ref 4.8–10.8)
WBC # FLD AUTO: 12.5 K/UL — HIGH (ref 4.8–10.8)

## 2019-01-25 PROCEDURE — 73502 X-RAY EXAM HIP UNI 2-3 VIEWS: CPT

## 2019-01-25 PROCEDURE — 76000 FLUOROSCOPY <1 HR PHYS/QHP: CPT

## 2019-01-25 PROCEDURE — C1713: CPT

## 2019-01-25 PROCEDURE — 97163 PT EVAL HIGH COMPLEX 45 MIN: CPT

## 2019-01-25 PROCEDURE — 85027 COMPLETE CBC AUTOMATED: CPT

## 2019-01-25 PROCEDURE — 80048 BASIC METABOLIC PNL TOTAL CA: CPT

## 2019-01-25 PROCEDURE — 97116 GAIT TRAINING THERAPY: CPT

## 2019-01-25 PROCEDURE — 94640 AIRWAY INHALATION TREATMENT: CPT

## 2019-01-25 PROCEDURE — 97167 OT EVAL HIGH COMPLEX 60 MIN: CPT

## 2019-01-25 PROCEDURE — 97110 THERAPEUTIC EXERCISES: CPT

## 2019-01-25 PROCEDURE — C1776: CPT

## 2019-01-25 PROCEDURE — 99232 SBSQ HOSP IP/OBS MODERATE 35: CPT

## 2019-01-25 PROCEDURE — 82962 GLUCOSE BLOOD TEST: CPT

## 2019-01-25 RX ORDER — ENOXAPARIN SODIUM 100 MG/ML
40 INJECTION SUBCUTANEOUS
Qty: 30 | Refills: 0
Start: 2019-01-25 | End: 2019-02-23

## 2019-01-25 RX ADMIN — SCOPALAMINE 1 PATCH: 1 PATCH, EXTENDED RELEASE TRANSDERMAL at 14:20

## 2019-01-25 RX ADMIN — OXYCODONE HYDROCHLORIDE 10 MILLIGRAM(S): 5 TABLET ORAL at 05:15

## 2019-01-25 RX ADMIN — ENOXAPARIN SODIUM 40 MILLIGRAM(S): 100 INJECTION SUBCUTANEOUS at 05:35

## 2019-01-25 RX ADMIN — Medication 1 TABLET(S): at 12:41

## 2019-01-25 RX ADMIN — SODIUM CHLORIDE 3 MILLILITER(S): 9 INJECTION INTRAMUSCULAR; INTRAVENOUS; SUBCUTANEOUS at 05:20

## 2019-01-25 RX ADMIN — SCOPALAMINE 1 PATCH: 1 PATCH, EXTENDED RELEASE TRANSDERMAL at 10:52

## 2019-01-25 RX ADMIN — Medication 500 MILLIGRAM(S): at 05:16

## 2019-01-25 RX ADMIN — Medication 975 MILLIGRAM(S): at 05:46

## 2019-01-25 RX ADMIN — Medication 975 MILLIGRAM(S): at 05:16

## 2019-01-25 RX ADMIN — Medication 300 MILLIGRAM(S): at 09:20

## 2019-01-25 RX ADMIN — PREGABALIN 1000 MICROGRAM(S): 225 CAPSULE ORAL at 12:41

## 2019-01-25 RX ADMIN — OXYCODONE HYDROCHLORIDE 10 MILLIGRAM(S): 5 TABLET ORAL at 05:46

## 2019-01-25 RX ADMIN — Medication 100 MILLIGRAM(S): at 05:16

## 2019-01-25 RX ADMIN — LOSARTAN POTASSIUM 100 MILLIGRAM(S): 100 TABLET, FILM COATED ORAL at 05:16

## 2019-01-25 RX ADMIN — Medication 975 MILLIGRAM(S): at 13:49

## 2019-01-25 RX ADMIN — Medication 500 MILLIGRAM(S): at 12:41

## 2019-01-25 NOTE — PROGRESS NOTE ADULT - ASSESSMENT
78 year old female Anemia, Asthma, Colon polyps, Diabetes mellitus, Fibromyalgia, GERD, Glaucoma, Hypertension, Neuropathy, Osteoarthritis  hip, Vertigo, arthritis of the hip, has hip pain that radiates down leg, uses cane for ambulation, pain with walking and sleep, she takes tylenol for pain little relief, she came in today for Right total hip replacement, s/p procedure post op day 02.     Plan:   OA of right hip s/p RTHR: Pain meds, DVT prophylaxis, Perioperative antibiotics as per Primary team, PT eval, meds to prevent constipation, Incentive spirometry, BP is stable, eating and drinking well, will d/c IV fluids.    HTN:  Will continue with losartan 100 mg once a day with holding parameters.    DM: FS monitoring and coverage insulin, will hold metformin.     Fibromyalgia: Will continue with gabapentin 300 mg once a day (at bedtime)      GERD: Protonix 40mg daily while here in the hospital     Hx of Asthma: Will continue with fluticasone-salmeterol 100 mcg-50 mcg inhalation 1 puff(s) inhaled 2 times a day, theophylline 300 mg daily, Proventil HFA 90 mcg/inh inhalation 2 puff(s) inhaled 4 times a day, As Needed - for shortness of breath and/or wheezing, will also put her on duo nebs as needed.     Hx of Vertigo: Will continue with meclizine 12.5 mg once a day, As Needed    DVT prophylaxis as Ortho Service.  Patient will work with PT to see if she is  having any more dizziness, otherwise Patient is stable from the medicine point of view for discharge, pending PT and Ortho clearance.
78 year old female Anemia, Asthma, Colon polyps, Diabetes mellitus, Fibromyalgia, GERD, Glaucoma, Hypertension, Neuropathy, Osteoarthritis  hip, Vertigo, arthritis of the hip, has hip pain that radiates down leg, uses cane for ambulation, pain with walking and sleep, she takes tylenol for pain little relief, she came in today for Right total hip replacement, s/p procedure post op day 01.     Plan:   OA of right hip s/p RTHR: Pain meds, DVT prophylaxis, Perioperative antibiotics as per Primary team, PT eval, meds to prevent constipation, Incentive spirometry, BP is stable, eating and drinking well, will d/c IV fluids.    HTN:  Will continue with losartan 100 mg once a day with holding parameters.    DM: FS monitoring and coverage insulin, will hold metformin.     Fibromyalgia: Will continue with gabapentin 300 mg once a day (at bedtime)      GERD: Protonix 40mg daily while here in the hospital     Hx of Asthma: Will continue with fluticasone-salmeterol 100 mcg-50 mcg inhalation 1 puff(s) inhaled 2 times a day, theophylline 300 mg daily, Proventil HFA 90 mcg/inh inhalation 2 puff(s) inhaled 4 times a day, As Needed - for shortness of breath and/or wheezing, will also put her on duo nebs as needed.     Hx of Vertigo: Will continue with meclizine 12.5 mg once a day, As Needed    DVT prophylaxis as Ortho Service.    Patient is stable from the medicine point of view for discharge, pending PT and Ortho clearance.

## 2019-01-31 ENCOUNTER — OTHER (OUTPATIENT)
Age: 79
End: 2019-01-31

## 2019-02-06 ENCOUNTER — OTHER (OUTPATIENT)
Age: 79
End: 2019-02-06

## 2019-02-07 ENCOUNTER — OTHER (OUTPATIENT)
Age: 79
End: 2019-02-07

## 2019-02-07 ENCOUNTER — APPOINTMENT (OUTPATIENT)
Dept: ORTHOPEDIC SURGERY | Facility: CLINIC | Age: 79
End: 2019-02-07
Payer: MEDICARE

## 2019-02-07 VITALS
WEIGHT: 146 LBS | SYSTOLIC BLOOD PRESSURE: 159 MMHG | BODY MASS INDEX: 27.21 KG/M2 | HEIGHT: 61.5 IN | HEART RATE: 84 BPM | DIASTOLIC BLOOD PRESSURE: 81 MMHG

## 2019-02-07 PROCEDURE — 73502 X-RAY EXAM HIP UNI 2-3 VIEWS: CPT | Mod: RT

## 2019-02-07 PROCEDURE — 99024 POSTOP FOLLOW-UP VISIT: CPT

## 2019-02-07 NOTE — ADDENDUM
[FreeTextEntry1] : This note was authored by Lee Garcia working as a medical scribe for Dr. Salo Sewell. The note was reviewed, edited, and revised by Dr. Salo Sewell whom is in agreement with the exam findings, imaging findings, and treatment plan. 02/07/2019.

## 2019-02-07 NOTE — HISTORY OF PRESENT ILLNESS
[Doing Well] : is doing well [Excellent Pain Control] : has excellent pain control [No Sign of Infection] : is showing no signs of infection [Chills] : no chills [Constipation] : no constipation [Diarrhea] : no diarrhea [Dysuria] : no dysuria [Fever] : no fever [Nausea] : no nausea [Vomiting] : no vomiting [Erythema] : not erythematous [Discharge] : absent of discharge [Dehiscence] : not dehisced [de-identified] : S/P Right DAA THR, DOS: 1/23/19.\par S/P Right computer assisted TKR, DOS: 11/15/17.\par S/P Left computer-assisted TKR, DOS: 10/6/17. \par  [de-identified] : The patient is a 78 year old female 2 weeks s/p right anterior THR. She is ambulating and transferring with a walker outside and a cane at home. She will finish out home physical therapy and transition to outpatient. For DVT prophylaxis she is on Lovenox injections. She is on Celebrex for prevention of heterotopic ossification. Pain is controlled well with Tylenol and oxycodone. She denies systemic symptoms of fever or chills. She denies drainage from the incision site.  [de-identified] : Exam of the right hip shows a well healing incision. SLR without difficulty, smooth hip ROM. 5/5 motor strength bilaterally distally. Sensation intact distally.  [de-identified] : Xray- AP pelvis and 2 views of the right hip shows a hip replacement in stable position, without sign of fracture or dislocation.  [de-identified] : The patient is doing very well 2 weeks after right anterior total hip replacement. The patient will be transitioned to outpatient physical therapy and a prescription was given for that. Pain medication was renewed. The patient will finish her course of Celebrex for prevention of heterotopic ossification. The patient will continue Lovenox for DVT prophylaxis for 2 more weeks. The patient was educated on dental prophylaxis. Anterior hip precautions were reinforced. Overall the patient is very happy with their outcome so far. Followup in 4 weeks with repeat x-rays.

## 2019-02-26 ENCOUNTER — OTHER (OUTPATIENT)
Age: 79
End: 2019-02-26

## 2019-03-04 ENCOUNTER — TRANSCRIPTION ENCOUNTER (OUTPATIENT)
Age: 79
End: 2019-03-04

## 2019-03-07 ENCOUNTER — APPOINTMENT (OUTPATIENT)
Dept: ORTHOPEDIC SURGERY | Facility: CLINIC | Age: 79
End: 2019-03-07
Payer: MEDICARE

## 2019-03-07 VITALS
BODY MASS INDEX: 27.21 KG/M2 | HEART RATE: 81 BPM | DIASTOLIC BLOOD PRESSURE: 91 MMHG | WEIGHT: 146 LBS | SYSTOLIC BLOOD PRESSURE: 172 MMHG | HEIGHT: 61.5 IN

## 2019-03-07 PROBLEM — R42 DIZZINESS AND GIDDINESS: Chronic | Status: ACTIVE | Noted: 2019-01-03

## 2019-03-07 PROCEDURE — 73502 X-RAY EXAM HIP UNI 2-3 VIEWS: CPT | Mod: TC,RT

## 2019-03-07 PROCEDURE — 99024 POSTOP FOLLOW-UP VISIT: CPT

## 2019-03-07 RX ORDER — FLUTICASONE PROPIONATE AND SALMETEROL 50; 250 UG/1; UG/1
250-50 POWDER RESPIRATORY (INHALATION)
Qty: 60 | Refills: 0 | Status: ACTIVE | COMMUNITY
Start: 2019-01-09

## 2019-03-07 RX ORDER — THEOPHYLLINE 300 MG/1
300 TABLET, EXTENDED RELEASE ORAL
Qty: 180 | Refills: 0 | Status: ACTIVE | COMMUNITY
Start: 2018-10-17

## 2019-03-07 RX ORDER — LORATADINE 10 MG/1
10 TABLET ORAL
Qty: 30 | Refills: 0 | Status: ACTIVE | COMMUNITY
Start: 2019-01-09

## 2019-03-07 NOTE — HISTORY OF PRESENT ILLNESS
[de-identified] : S/P Right DAA THR, DOS: 1/23/19.\par S/P Right computer assisted TKR, DOS: 11/15/17.\par  [de-identified] : This 78-year-old female presents for followup of her right anterior approach THR. She is now 6 weeks postop. She has concluded Lovenox for DVT prophylaxis. She does continue to note a  persistent scab over the mid aspect of the incision. She's denying fever or chills. She reports pain has decreased in frequency and intensity. It is now mild. She will take Tylenol as needed. She's been continuing with physical therapy. She is transferring and ambulating with a cane with no significant limp. She continues to work on stair reciprocity. [de-identified] : The right hip is without erythema, abrasions, or ecchymosis. 2 inches from the distal aspect of the incision is a 1.5 cm x 0.5 cm scab. I was able to express a small amount of purulent drainage. Scab was removed. Area was cleansed and a DSD applied. Tissue is pink with areas of yellow eschar. No palpable fluctuance. No pain with palpation to the hip. Hip is a functional range of passive, pain-free, smooth motion. Straight leg raise without difficulty. No lower extremity swelling. Calf is nontender Homans test is negative. [de-identified] : X-rays-AP of the pelvis and 2 views of the right hip she prosthesis to be in satisfactory alignment. There is no evidence of fracture or dislocation. [de-identified] : This is a 78-year-old female 6 weeks status post right anterior approach THR.  [de-identified] : The patient is 6 weeks following anterior total hip replacement. She does have a 1-1/2 cm by half centimeter open area 2 inches from the distal aspect of the incision. Tissue is pink the areas of yellow eschar. Patient was advised to cleanse this daily then apply medihoney gel and a dry sterile dressing. She was given a prescription for Bactrim. Anterior hip precautions were reviewed and recommended to be followed for another 6 weeks.  The patient will continue outpatient physical therapy and gradually transition to a home exercise program over the next 6 weeks.   Dental prophylaxis was reviewed. She was made aware of any changes in her clinical condition that would warrant urgent evaluation or intervention.Followup in 1 week for reevaluation of incision. She notes good understanding and agreement with the plan of care.

## 2019-03-14 ENCOUNTER — APPOINTMENT (OUTPATIENT)
Dept: ORTHOPEDIC SURGERY | Facility: CLINIC | Age: 79
End: 2019-03-14
Payer: MEDICARE

## 2019-03-14 VITALS
HEART RATE: 105 BPM | DIASTOLIC BLOOD PRESSURE: 72 MMHG | SYSTOLIC BLOOD PRESSURE: 120 MMHG | WEIGHT: 146 LBS | BODY MASS INDEX: 27.21 KG/M2 | HEIGHT: 61.5 IN

## 2019-03-14 PROCEDURE — 99024 POSTOP FOLLOW-UP VISIT: CPT

## 2019-03-14 RX ORDER — CELECOXIB 200 MG/1
200 CAPSULE ORAL
Refills: 0 | Status: DISCONTINUED | COMMUNITY
End: 2019-03-14

## 2019-03-14 RX ORDER — ENOXAPARIN SODIUM 300 MG/3ML
INJECTION INTRAVENOUS; SUBCUTANEOUS
Refills: 0 | Status: DISCONTINUED | COMMUNITY
End: 2019-03-14

## 2019-03-14 RX ORDER — BLOOD SUGAR DIAGNOSTIC
STRIP MISCELLANEOUS
Qty: 100 | Refills: 0 | Status: DISCONTINUED | COMMUNITY
Start: 2018-04-13 | End: 2019-03-14

## 2019-03-14 RX ORDER — ENOXAPARIN SODIUM 100 MG/ML
40 INJECTION SUBCUTANEOUS
Qty: 12 | Refills: 0 | Status: DISCONTINUED | COMMUNITY
Start: 2019-01-25 | End: 2019-03-14

## 2019-03-14 RX ORDER — THEOPHYLLINE ANHYDROUS 300 MG
300 TABLET, EXTENDED RELEASE 12 HR ORAL
Refills: 0 | Status: DISCONTINUED | COMMUNITY
End: 2019-03-14

## 2019-03-14 RX ORDER — METFORMIN HYDROCHLORIDE 500 MG/1
500 TABLET, COATED ORAL
Refills: 0 | Status: DISCONTINUED | COMMUNITY
End: 2019-03-14

## 2019-03-14 RX ORDER — SULFAMETHOXAZOLE AND TRIMETHOPRIM 800; 160 MG/1; MG/1
800-160 TABLET ORAL TWICE DAILY
Qty: 14 | Refills: 0 | Status: DISCONTINUED | COMMUNITY
Start: 2019-03-07 | End: 2019-03-14

## 2019-03-14 RX ORDER — CEFADROXIL 500 MG/1
500 CAPSULE ORAL
Qty: 14 | Refills: 0 | Status: DISCONTINUED | COMMUNITY
Start: 2019-01-24 | End: 2019-03-14

## 2019-03-14 RX ORDER — LANCETS 28 GAUGE
EACH MISCELLANEOUS
Qty: 100 | Refills: 0 | Status: DISCONTINUED | COMMUNITY
Start: 2018-04-13 | End: 2019-03-14

## 2019-03-14 NOTE — HISTORY OF PRESENT ILLNESS
[Doing Well] : is doing well [Excellent Pain Control] : has excellent pain control [No Sign of Infection] : is showing no signs of infection [Chills] : no chills [Constipation] : no constipation [Diarrhea] : no diarrhea [Dysuria] : no dysuria [Fever] : no fever [Nausea] : no nausea [Vomiting] : no vomiting [Erythema] : not erythematous [Discharge] : absent of discharge [Dehiscence] : not dehisced [de-identified] : S/P Right DAA THR, DOS: 1/23/19.\par S/P Right computer assisted TKR, DOS: 11/15/17. [de-identified] : This 78-year-old female presents for followup of her right hip incision. Since her last visit to the office one week ago she has been performing daily wound care. She completed her antibiotics as prescribed. She is noting overall improvement. She denies systemic symptoms of fever or chills. [de-identified] : Exam of the right hip shows a well healing incision. The previous dehiscence has healed nearly completely, with small residual granulation tissue. 5/5 motor strength bilaterally distally. Sensation intact distally.  [de-identified] : The patient is a 78 year old female 7 weeks s/p right THR. She was recommended to continue use of the Medihoney. No longer concerned about incision as it is nearly completely healed. She will continue her postoperative course and follow up in 6 weeks for repeat evaluation.

## 2019-03-14 NOTE — ADDENDUM
[FreeTextEntry1] : This note was authored by Lee Garcia working as a medical scribe for Dr. Salo Sewell. The note was reviewed, edited, and revised by Dr. Salo Sewell whom is in agreement with the exam findings, imaging findings, and treatment plan. 03/14/2019.

## 2019-04-08 ENCOUNTER — OTHER (OUTPATIENT)
Age: 79
End: 2019-04-08

## 2019-04-15 ENCOUNTER — OTHER (OUTPATIENT)
Age: 79
End: 2019-04-15

## 2019-04-15 ENCOUNTER — APPOINTMENT (OUTPATIENT)
Dept: ORTHOPEDIC SURGERY | Facility: CLINIC | Age: 79
End: 2019-04-15
Payer: MEDICARE

## 2019-04-15 VITALS
WEIGHT: 134 LBS | DIASTOLIC BLOOD PRESSURE: 81 MMHG | BODY MASS INDEX: 24.98 KG/M2 | HEIGHT: 61.5 IN | SYSTOLIC BLOOD PRESSURE: 156 MMHG | HEART RATE: 99 BPM

## 2019-04-15 DIAGNOSIS — Z47.1 AFTERCARE FOLLOWING JOINT REPLACEMENT SURGERY: ICD-10-CM

## 2019-04-15 DIAGNOSIS — Z96.649 PRESENCE OF UNSPECIFIED ARTIFICIAL HIP JOINT: ICD-10-CM

## 2019-04-15 DIAGNOSIS — Z96.641 AFTERCARE FOLLOWING JOINT REPLACEMENT SURGERY: ICD-10-CM

## 2019-04-15 PROCEDURE — 99024 POSTOP FOLLOW-UP VISIT: CPT

## 2019-04-15 PROCEDURE — 73502 X-RAY EXAM HIP UNI 2-3 VIEWS: CPT

## 2019-04-15 NOTE — ADDENDUM
[FreeTextEntry1] : This note was authored by Lee Garcia working as a medical scribe for Dr. Salo Sewell. The note was reviewed, edited, and revised by Dr. Salo Sewell whom is in agreement with the exam findings, imaging findings, and treatment plan. 04/15/2019.

## 2019-04-15 NOTE — HISTORY OF PRESENT ILLNESS
[Doing Well] : is doing well [Excellent Pain Control] : has excellent pain control [No Sign of Infection] : is showing no signs of infection [Chills] : no chills [Constipation] : no constipation [Diarrhea] : no diarrhea [Fever] : no fever [Dysuria] : no dysuria [Vomiting] : no vomiting [Erythema] : not erythematous [Nausea] : no nausea [Dehiscence] : not dehisced [Discharge] : absent of discharge [de-identified] : S/P Right DAA THR, DOS: 1/23/19.\par S/P Right computer assisted TKR, DOS: 11/15/17. \par  [de-identified] : The patient is a 78 year old female 12 weeks s/p right anterior THR. She is ambulating and transferring well with a cane for safety. She has returned to daily activities of living and reports no pain or stiffness of the hip. Overall, she is very happy with the results of the surgery.  [de-identified] : Exam of the right hip shows a well healed incision. External rotation of 60 degrees, internal rotation of 20, hip flexion of 110 degrees. 5/5 motor strength bilaterally distally. Sensation intact distally.  [de-identified] : Xray- AP pelvis and 2 views of the right hip shows a hip replacement in stable position, without sign of fracture or dislocation.  [de-identified] : The patient is a 78 year old female 12 weeks s/p right anterior THR. She will continue home strengthening exercises. Overall, she is very happy with the results of the surgery. Dental prophylaxis was advised. Follow up one year post op for radiographic surveillance.

## 2019-04-23 ENCOUNTER — FORM ENCOUNTER (OUTPATIENT)
Age: 79
End: 2019-04-23

## 2019-10-15 NOTE — PROGRESS NOTE ADULT - SUBJECTIVE AND OBJECTIVE BOX
NAILA HODGE    [id number]    History: Patient is status post right anterior total hip arthroplasty on POD #1. Patient is doing well and is comfortable. The patient's pain is controlled using the prescribed pain medications. The patient is participating in physical therapy. Denies nausea, vomiting, chest pain, shortness of breath, abdominal pain or fever. No new complaints.                          12.4   10.3  )-----------( 214      ( 24 Jan 2019 06:14 )             37.3             MEDICATIONS  (STANDING):  acetaminophen   Tablet .. 975 milliGRAM(s) Oral every 8 hours  acetaminophen  IVPB .. 1000 milliGRAM(s) IV Intermittent <User Schedule>  buDESOnide  80 MICROgram(s)/formoterol 4.5 MICROgram(s) Inhaler 2 Puff(s) Inhalation two times a day  cephalexin 500 milliGRAM(s) Oral four times a day  cyanocobalamin 1000 MICROGram(s) Oral daily  dextrose 5%. 1000 milliLiter(s) (50 mL/Hr) IV Continuous <Continuous>  dextrose 50% Injectable 12.5 Gram(s) IV Push once  dextrose 50% Injectable 25 Gram(s) IV Push once  dextrose 50% Injectable 25 Gram(s) IV Push once  docusate sodium 100 milliGRAM(s) Oral three times a day  enoxaparin Injectable 40 milliGRAM(s) SubCutaneous every 24 hours  gabapentin 300 milliGRAM(s) Oral at bedtime  insulin lispro (HumaLOG) corrective regimen sliding scale   SubCutaneous three times a day before meals  lactated ringers. 1000 milliLiter(s) (100 mL/Hr) IV Continuous <Continuous>  multivitamin/minerals 1 Tablet(s) Oral daily  oxyCODONE  ER Tablet 10 milliGRAM(s) Oral every 12 hours  polyethylene glycol 3350 17 Gram(s) Oral daily  sodium chloride 0.9% lock flush 3 milliLiter(s) IV Push every 8 hours  theophylline ER Capsule 300 milliGRAM(s) Oral <User Schedule>    MEDICATIONS  (PRN):  ALBUTerol    90 MICROgram(s) HFA Inhaler 2 Puff(s) Inhalation four times a day PRN for shortness of breath and/or wheezing  aluminum hydroxide/magnesium hydroxide/simethicone Suspension 30 milliLiter(s) Oral four times a day PRN Indigestion  dextrose 40% Gel 15 Gram(s) Oral once PRN Blood Glucose LESS THAN 70 milliGRAM(s)/deciliter  glucagon  Injectable 1 milliGRAM(s) IntraMuscular once PRN Glucose LESS THAN 70 milligrams/deciliter  HYDROmorphone   Tablet 2 milliGRAM(s) Oral every 3 hours PRN Severe Pain (7 - 10)  HYDROmorphone  Injectable 0.5 milliGRAM(s) IV Push every 3 hours PRN Breakthrough Pain  magnesium hydroxide Suspension 30 milliLiter(s) Oral at bedtime PRN Constipation  meclizine 12.5 milliGRAM(s) Oral daily PRN Dizziness  ondansetron Injectable 4 milliGRAM(s) IV Push every 6 hours PRN Nausea and/or Vomiting  oxyCODONE    IR 5 milliGRAM(s) Oral every 3 hours PRN Mild Pain (1 - 3)  oxyCODONE    IR 10 milliGRAM(s) Oral every 3 hours PRN Moderate Pain (4 - 6)  senna 2 Tablet(s) Oral at bedtime PRN Constipation      Physical exam: The right hip dressing is clean, dry and intact. No drainage or discharge. No erythema is noted. No blistering. No ecchymosis. Dressing removed, incision c/d/i, new op-site dressing placed. The calf is supple nontender. Sensation to light touch is grossly intact distally. The lateral cutaneous nerve is intact. Motor function distally is 5/5. No foot drop. 2+ dorsalis pedis pulse. Capillary refill is less than 2 seconds. No cyanosis.    Primary Orthopedic Assessment:  • s/p RIGHT ANTERIOR total hip replacement    Secondary  Orthopedic Assessment(s):   •     Secondary  Medical Assessment(s):   •     Plan:   • DVT prophylaxis as prescribed, including use of compression devices and ankle pumps  • Continue physical therapy  • Weightbearing as tolerated of the right lower extremity with assistance of a walker  • Incentive spirometry encouraged  • Pain control as clinically indicated  • Anterior hip precautions reviewed with patient  • Discharge planning – anticipated discharge is Home
NAILA HODGE    [id number]    78y      Female    Patient is a 78y old  Female who presents with a chief complaint of Elective Right Anterior Total Hip Arthroplasty for Degenerative Joint Disease (23 Jan 2019 23:32)      INTERVAL HPI/OVERNIGHT EVENTS:    Patient is doing well, her pain is well controlled, denies dizziness, chest pain, sob, fever, chills, chest pain.     REVIEW OF SYSTEMS:    CONSTITUTIONAL: No fever, some fatigue  RESPIRATORY: No cough, No shortness of breath  CARDIOVASCULAR: No chest pain, palpitations  GASTROINTESTINAL: No abdominal, no nausea, no vomiting  NEUROLOGICAL: No headaches, loss of strength  MISCELLANEOUS: Right hip pain well controlled       Vital Signs Last 24 Hrs  T(C): 37.5 (24 Jan 2019 07:58), Max: 37.5 (24 Jan 2019 07:58)  T(F): 99.5 (24 Jan 2019 07:58), Max: 99.5 (24 Jan 2019 07:58)  HR: 79 (24 Jan 2019 09:56) (47 - 79)  BP: 107/61 (24 Jan 2019 07:58) (99/56 - 143/56)  RR: 18 (24 Jan 2019 07:58) (12 - 20)  SpO2: 93% (24 Jan 2019 09:56) (93% - 100%)    PHYSICAL EXAM:    GENERAL: Elderly female looking comfortable  HEENT: PERRL, +EOMI  NECK: soft, Supple, No JVD, 	  CHEST/LUNG: Clear to auscultate bilaterally; No wheezing  HEART: S1S2+, Regular rate and rhythm; No murmurs  ABDOMEN: Soft, Non tender, Nondistended; Bowel sounds present  EXTREMITIES:  2+ Peripheral Pulses, No edema, Right hip surgical wound with dressings on, no bleeding or soaking.   SKIN: No rashes or lesions  NEURO: AAOX3, no focal deficits, no motor r sensory loss  PSYCH: normal mood      LABS:                        12.4   10.3  )-----------( 214      ( 24 Jan 2019 06:14 )             37.3     01-24    140  |  103  |  7.0<L>  ----------------------------<  121<H>  3.9   |  24.0  |  0.39<L>    Ca    8.7      24 Jan 2019 06:14              I&O's Summary    23 Jan 2019 07:01  -  24 Jan 2019 07:00  --------------------------------------------------------  IN: 1200 mL / OUT: 300 mL / NET: 900 mL        MEDICATIONS  (STANDING):  acetaminophen   Tablet .. 975 milliGRAM(s) Oral every 8 hours  acetaminophen  IVPB .. 1000 milliGRAM(s) IV Intermittent <User Schedule>  buDESOnide  80 MICROgram(s)/formoterol 4.5 MICROgram(s) Inhaler 2 Puff(s) Inhalation two times a day  cephalexin 500 milliGRAM(s) Oral four times a day  cyanocobalamin 1000 MICROGram(s) Oral daily  dextrose 5%. 1000 milliLiter(s) (50 mL/Hr) IV Continuous <Continuous>  dextrose 50% Injectable 12.5 Gram(s) IV Push once  dextrose 50% Injectable 25 Gram(s) IV Push once  dextrose 50% Injectable 25 Gram(s) IV Push once  docusate sodium 100 milliGRAM(s) Oral three times a day  enoxaparin Injectable 40 milliGRAM(s) SubCutaneous every 24 hours  gabapentin 300 milliGRAM(s) Oral at bedtime  insulin lispro (HumaLOG) corrective regimen sliding scale   SubCutaneous three times a day before meals  lactated ringers. 1000 milliLiter(s) (100 mL/Hr) IV Continuous <Continuous>  multivitamin/minerals 1 Tablet(s) Oral daily  oxyCODONE  ER Tablet 10 milliGRAM(s) Oral every 12 hours  polyethylene glycol 3350 17 Gram(s) Oral daily  sodium chloride 0.9% lock flush 3 milliLiter(s) IV Push every 8 hours  theophylline SR Tablet 300 milliGRAM(s) Oral <User Schedule>    MEDICATIONS  (PRN):  ALBUTerol    90 MICROgram(s) HFA Inhaler 2 Puff(s) Inhalation four times a day PRN for shortness of breath and/or wheezing  aluminum hydroxide/magnesium hydroxide/simethicone Suspension 30 milliLiter(s) Oral four times a day PRN Indigestion  dextrose 40% Gel 15 Gram(s) Oral once PRN Blood Glucose LESS THAN 70 milliGRAM(s)/deciliter  glucagon  Injectable 1 milliGRAM(s) IntraMuscular once PRN Glucose LESS THAN 70 milligrams/deciliter  HYDROmorphone   Tablet 2 milliGRAM(s) Oral every 3 hours PRN Severe Pain (7 - 10)  HYDROmorphone  Injectable 0.5 milliGRAM(s) IV Push every 3 hours PRN Breakthrough Pain  magnesium hydroxide Suspension 30 milliLiter(s) Oral at bedtime PRN Constipation  meclizine 12.5 milliGRAM(s) Oral daily PRN Dizziness  ondansetron Injectable 4 milliGRAM(s) IV Push every 6 hours PRN Nausea and/or Vomiting  oxyCODONE    IR 5 milliGRAM(s) Oral every 3 hours PRN Mild Pain (1 - 3)  oxyCODONE    IR 10 milliGRAM(s) Oral every 3 hours PRN Moderate Pain (4 - 6)  senna 2 Tablet(s) Oral at bedtime PRN Constipation
NAILA HODGE    [id number]    History: Patient is status post right anterior total hip arthroplasty on POD#2. Patient is doing well and is comfortable. The patient's pain is controlled using the prescribed pain medications. The patient is participating in physical therapy. Denies nausea, vomiting, chest pain, shortness of breath, abdominal pain or fever. No new complaints.                          11.5   12.5  )-----------( 270      ( 25 Jan 2019 07:25 )             34.0     01-25    142  |  106  |  8.0  ----------------------------<  123<H>  3.5   |  26.0  |  0.51    Ca    8.8      25 Jan 2019 07:25        MEDICATIONS  (STANDING):  acetaminophen   Tablet .. 975 milliGRAM(s) Oral every 8 hours  acetaminophen  IVPB .. 1000 milliGRAM(s) IV Intermittent <User Schedule>  buDESOnide  80 MICROgram(s)/formoterol 4.5 MICROgram(s) Inhaler 2 Puff(s) Inhalation two times a day  cephalexin 500 milliGRAM(s) Oral four times a day  cyanocobalamin 1000 MICROGram(s) Oral daily  dextrose 5%. 1000 milliLiter(s) (50 mL/Hr) IV Continuous <Continuous>  dextrose 50% Injectable 12.5 Gram(s) IV Push once  dextrose 50% Injectable 25 Gram(s) IV Push once  dextrose 50% Injectable 25 Gram(s) IV Push once  docusate sodium 100 milliGRAM(s) Oral three times a day  enoxaparin Injectable 40 milliGRAM(s) SubCutaneous every 24 hours  gabapentin 300 milliGRAM(s) Oral at bedtime  insulin lispro (HumaLOG) corrective regimen sliding scale   SubCutaneous three times a day before meals  lactated ringers. 1000 milliLiter(s) (100 mL/Hr) IV Continuous <Continuous>  losartan 100 milliGRAM(s) Oral daily  multivitamin/minerals 1 Tablet(s) Oral daily  oxyCODONE  ER Tablet 10 milliGRAM(s) Oral every 12 hours  polyethylene glycol 3350 17 Gram(s) Oral daily  sodium chloride 0.9% lock flush 3 milliLiter(s) IV Push every 8 hours  theophylline SR Tablet 300 milliGRAM(s) Oral <User Schedule>    MEDICATIONS  (PRN):  ALBUTerol    90 MICROgram(s) HFA Inhaler 2 Puff(s) Inhalation four times a day PRN for shortness of breath and/or wheezing  aluminum hydroxide/magnesium hydroxide/simethicone Suspension 30 milliLiter(s) Oral four times a day PRN Indigestion  bisacodyl Suppository 10 milliGRAM(s) Rectal daily PRN If no bowel movement by POD#2  dextrose 40% Gel 15 Gram(s) Oral once PRN Blood Glucose LESS THAN 70 milliGRAM(s)/deciliter  glucagon  Injectable 1 milliGRAM(s) IntraMuscular once PRN Glucose LESS THAN 70 milligrams/deciliter  HYDROmorphone   Tablet 2 milliGRAM(s) Oral every 3 hours PRN Severe Pain (7 - 10)  HYDROmorphone  Injectable 0.5 milliGRAM(s) IV Push every 3 hours PRN Breakthrough Pain  magnesium hydroxide Suspension 30 milliLiter(s) Oral at bedtime PRN Constipation  meclizine 12.5 milliGRAM(s) Oral daily PRN Dizziness  ondansetron Injectable 4 milliGRAM(s) IV Push every 6 hours PRN Nausea and/or Vomiting  oxyCODONE    IR 5 milliGRAM(s) Oral every 3 hours PRN Mild Pain (1 - 3)  oxyCODONE    IR 10 milliGRAM(s) Oral every 3 hours PRN Moderate Pain (4 - 6)  senna 2 Tablet(s) Oral at bedtime PRN Constipation      Physical exam: The right hip dressing is clean, dry and intact. No drainage or discharge. No erythema is noted. No blistering. No ecchymosis. The calf is supple nontender. Passive range of motion is acceptable to due postoperative pain. Sensation to light touch is grossly intact distally. The lateral cutaneous nerve is intact. Motor function distally is 5/5. No foot drop. 2+ dorsalis pedis pulse. Capillary refill is less than 2 seconds. No cyanosis.    Primary Orthopedic Assessment:  • s/p RIGHT ANTERIOR total hip replacement    Secondary  Orthopedic Assessment(s):   •     Secondary  Medical Assessment(s):   •     Plan:   • DVT prophylaxis as prescribed, including use of compression devices and ankle pumps  • Continue physical therapy  • Weightbearing as tolerated of the right lower extremity with assistance of a walker  • Incentive spirometry encouraged  • Pain control as clinically indicated  • Anterior hip precautions reviewed with patient  • Discharge planning – anticipated discharge is Home
PA - Note    Ortho Post Op Check    Name: NAILA HODGE    MR #: [id number]    Procedure: Right Anterior Total Hip Arthroplasty  Surgeon: Salo Sewell    Pt comfortable without complaints, pain controlled  Denies CP, SOB, N/V, numbness/tingling     General Exam:  Vital Signs Last 24 Hrs  T(C): 36.7 (01-23-19 @ 21:31), Max: 36.7 (01-23-19 @ 21:31)  T(F): 98.1 (01-23-19 @ 21:31), Max: 98.1 (01-23-19 @ 21:31)  HR: 74 (01-23-19 @ 21:31) (54 - 77)  BP: 134/63 (01-23-19 @ 21:31) (111/66 - 136/68)  BP(mean): --  RR: 18 (01-23-19 @ 21:31) (17 - 18)  SpO2: 94% (01-23-19 @ 21:31) (93% - 100%)    General: Pt Alert and oriented, NAD, controlled pain.  Dressings C/D/I. No bleeding.  Pulses: 2+ dorsalis pedis pulse. Cap refill < 2 sec.  Sensation: Grossly intact to light touch without deficit.  Motor: + ROM of toes, + Dorsal/Plantar Flexion of FOot    Post-op X-Ray:  EXAM:  XR HIP 2-3V RT                          PROCEDURE DATE:  01/23/2019          INTERPRETATION:  HISTORY: Total hip replacement    Two views of the RIGHT hip are submitted.    Evaluation demonstrates both femoral and acetabular components   well-seated and in good anatomic alignment. There is no evidence of   fracture. The visualized pelvis appears within normal limits.  Impression:  Hip replacement as described above.                TASIA ARREAGA M.D., ATTENDING RADIOLOGIST  This document has been electronically signed. Jan 23 2019  4:01PM      A/P: 78yFemale POD#0 s/p Right Anterior Total Hip Arthroplasty  - Stable  - Pain Control  - DVT ppx: Lovenox, SCDs  - Post op abx: Ancef, Vancomycin  - PT eval pending  - Weight bearing status: WBAT  - D/C Planning (Home)
Pelvis & hip films reviewed. Implants are in appropriate position. No fracture or dislocation noted. Patient is WBAT of the surgical extremity.     < from: Xray Hip 2-3 Views, Right (01.23.19 @ 15:59) >     EXAM:  XR HIP 2-3V RT                          PROCEDURE DATE:  01/23/2019      INTERPRETATION:  HISTORY: Total hip replacement    Two views of the RIGHT hip are submitted.    Evaluation demonstrates both femoral and acetabular components   well-seated and in good anatomic alignment. There is no evidence of   fracture. The visualized pelvis appears within normal limits.  Impression:  Hip replacement as described above.    TASIA ARREAGA M.D., ATTENDING RADIOLOGIST  This document has been electronically signed. Jan 23 2019  4:01PM              < end of copied text >
NAILA HODGE    [id number]    78y      Female    Patient is a 78y old  Female who presents with a chief complaint of THR (24 Jan 2019 10:47)      INTERVAL HPI/OVERNIGHT EVENTS:    Patient is doing well, her pain is well controlled, has some dizziness as she is very hungry and did not have breakfast, denies chest pain, sob, fever, chills, chest pain.     REVIEW OF SYSTEMS:    CONSTITUTIONAL: No fever, some fatigue  RESPIRATORY: No cough, No shortness of breath  CARDIOVASCULAR: No chest pain, palpitations  GASTROINTESTINAL: No abdominal, no nausea, no vomiting  NEUROLOGICAL: No headaches, loss of strength  MISCELLANEOUS: Right hip pain well controlled        Vital Signs Last 24 Hrs  T(C): 36.6 (25 Jan 2019 07:50), Max: 37.1 (24 Jan 2019 16:10)  T(F): 97.8 (25 Jan 2019 07:50), Max: 98.7 (24 Jan 2019 16:10)  HR: 61 (25 Jan 2019 07:50) (61 - 83)  BP: 90/46 (25 Jan 2019 07:50) (90/46 - 124/67)  RR: 18 (25 Jan 2019 07:50) (18 - 18)  SpO2: 92% (25 Jan 2019 07:50) (92% - 96%)    PHYSICAL EXAM:    GENERAL: Elderly female looking comfortable  HEENT: PERRL, +EOMI  NECK: soft, Supple, No JVD, 	  CHEST/LUNG: Clear to auscultate bilaterally; No wheezing  HEART: S1S2+, Regular rate and rhythm; No murmurs  ABDOMEN: Soft, Non tender, Nondistended; Bowel sounds present  EXTREMITIES:  2+ Peripheral Pulses, No edema, Right hip surgical wound with dressings on, no bleeding or soaking.   SKIN: No rashes or lesions  NEURO: AAOX3, no focal deficits, no motor r sensory loss  PSYCH: normal mood      LABS:                        11.5   12.5  )-----------( 270      ( 25 Jan 2019 07:25 )             34.0     01-25    142  |  106  |  8.0  ----------------------------<  123<H>  3.5   |  26.0  |  0.51    Ca    8.8      25 Jan 2019 07:25              I&O's Summary      MEDICATIONS  (STANDING):  acetaminophen   Tablet .. 975 milliGRAM(s) Oral every 8 hours  acetaminophen  IVPB .. 1000 milliGRAM(s) IV Intermittent <User Schedule>  buDESOnide  80 MICROgram(s)/formoterol 4.5 MICROgram(s) Inhaler 2 Puff(s) Inhalation two times a day  cephalexin 500 milliGRAM(s) Oral four times a day  cyanocobalamin 1000 MICROGram(s) Oral daily  dextrose 5%. 1000 milliLiter(s) (50 mL/Hr) IV Continuous <Continuous>  dextrose 50% Injectable 12.5 Gram(s) IV Push once  dextrose 50% Injectable 25 Gram(s) IV Push once  dextrose 50% Injectable 25 Gram(s) IV Push once  docusate sodium 100 milliGRAM(s) Oral three times a day  enoxaparin Injectable 40 milliGRAM(s) SubCutaneous every 24 hours  gabapentin 300 milliGRAM(s) Oral at bedtime  insulin lispro (HumaLOG) corrective regimen sliding scale   SubCutaneous three times a day before meals  lactated ringers. 1000 milliLiter(s) (100 mL/Hr) IV Continuous <Continuous>  losartan 100 milliGRAM(s) Oral daily  multivitamin/minerals 1 Tablet(s) Oral daily  oxyCODONE  ER Tablet 10 milliGRAM(s) Oral every 12 hours  polyethylene glycol 3350 17 Gram(s) Oral daily  sodium chloride 0.9% lock flush 3 milliLiter(s) IV Push every 8 hours  theophylline SR Tablet 300 milliGRAM(s) Oral <User Schedule>    MEDICATIONS  (PRN):  ALBUTerol    90 MICROgram(s) HFA Inhaler 2 Puff(s) Inhalation four times a day PRN for shortness of breath and/or wheezing  aluminum hydroxide/magnesium hydroxide/simethicone Suspension 30 milliLiter(s) Oral four times a day PRN Indigestion  bisacodyl Suppository 10 milliGRAM(s) Rectal daily PRN If no bowel movement by POD#2  dextrose 40% Gel 15 Gram(s) Oral once PRN Blood Glucose LESS THAN 70 milliGRAM(s)/deciliter  glucagon  Injectable 1 milliGRAM(s) IntraMuscular once PRN Glucose LESS THAN 70 milligrams/deciliter  HYDROmorphone   Tablet 2 milliGRAM(s) Oral every 3 hours PRN Severe Pain (7 - 10)  HYDROmorphone  Injectable 0.5 milliGRAM(s) IV Push every 3 hours PRN Breakthrough Pain  magnesium hydroxide Suspension 30 milliLiter(s) Oral at bedtime PRN Constipation  meclizine 12.5 milliGRAM(s) Oral daily PRN Dizziness  ondansetron Injectable 4 milliGRAM(s) IV Push every 6 hours PRN Nausea and/or Vomiting  oxyCODONE    IR 5 milliGRAM(s) Oral every 3 hours PRN Mild Pain (1 - 3)  oxyCODONE    IR 10 milliGRAM(s) Oral every 3 hours PRN Moderate Pain (4 - 6)  senna 2 Tablet(s) Oral at bedtime PRN Constipation
MD//SANJAY/SENG

## 2019-12-23 ENCOUNTER — OTHER (OUTPATIENT)
Age: 79
End: 2019-12-23

## 2020-01-02 ENCOUNTER — APPOINTMENT (OUTPATIENT)
Dept: ORTHOPEDIC SURGERY | Facility: CLINIC | Age: 80
End: 2020-01-02
Payer: MEDICARE

## 2020-01-02 VITALS
BODY MASS INDEX: 24.98 KG/M2 | HEIGHT: 61.5 IN | SYSTOLIC BLOOD PRESSURE: 150 MMHG | DIASTOLIC BLOOD PRESSURE: 84 MMHG | WEIGHT: 134 LBS | HEART RATE: 66 BPM

## 2020-01-02 PROCEDURE — 99213 OFFICE O/P EST LOW 20 MIN: CPT

## 2020-01-02 PROCEDURE — 73502 X-RAY EXAM HIP UNI 2-3 VIEWS: CPT | Mod: RT

## 2020-01-02 NOTE — HISTORY OF PRESENT ILLNESS
[de-identified] : The patient is a 79 year old female 1 year s/p right anterior THR. She is ambulating and transferring well with a cane. She reports continuing home strengthening exercises. She reports continuing daily activities of life without notable pain or discomfort. Overall, she is very happy with the results of the surgery.

## 2020-01-02 NOTE — REASON FOR VISIT
[Follow-Up Visit] : a follow-up visit for [Other: ____] : [unfilled] [FreeTextEntry2] : S/P Right DAA THR, DOS: 1/23/19.\par S/P Right computer assisted TKR, DOS: 11/15/17.

## 2020-01-02 NOTE — DISCUSSION/SUMMARY
[de-identified] : The patient is a 79 year old female 1 year s/p right anterior THR. She will continue home strengthening exercises. Overall, she is very happy with the results of the surgery. Dental prophylaxis was advised. Follow up in 2 years for radiographic surveillance.

## 2020-01-02 NOTE — PHYSICAL EXAM
[de-identified] : The patient appears well nourished  and in no apparent distress.  The patient is alert and oriented to person, place, and time.   Affect and mood appear normal. The head is normocephalic and atraumatic.  The eyes reveal normal sclera and extra ocular muscles are intact. The tongue is midline with no apparent lesions.  Skin shows normal turgor with no evidence of eczema or psoriasis.  No respiratory distress noted.  Sensation grossly intact.\par   [de-identified] : Exam of the right hip shows a well healed incision. SLR without difficulty, hip external rotation of 60 degrees, internal rotation of 30 degrees, hip flexion of 100 degrees. 5/5 motor strength bilaterally distally. Sensation intact distally. LFCN intact.  [de-identified] : Xray- AP pelvis and 2 views of the right hip shows a hip replacement in stable position, without sign of fracture or dislocation.

## 2020-01-02 NOTE — ADDENDUM
[FreeTextEntry1] : This note was authored by Lee Garcia working as a medical scribe for Dr. Salo Sewell. The note was reviewed, edited, and revised by Dr. Salo Sewell whom is in agreement with the exam findings, imaging findings, and treatment plan. 01/02/2020.

## 2020-04-09 PROBLEM — M25.559 HIP PAIN: Status: ACTIVE | Noted: 2018-10-29

## 2020-09-24 NOTE — OCCUPATIONAL THERAPY INITIAL EVALUATION ADULT - VISUAL ACUITY
Outpatient Physical Therapy Ortho Treatment Note  [facility]     Patient Name: Helena Herrmann  : 1973  MRN: [id number]  Today's Date: 2020      Visit Date: 2020    Visit Dx:    ICD-10-CM ICD-9-CM   1. Neck pain  M54.2 723.1       There is no problem list on file for this patient.       Past Medical History:   Diagnosis Date   • Scoliosis    • Spinal stenosis         Past Surgical History:   Procedure Laterality Date   • CHOLECYSTECTOMY                         PT Assessment/Plan     Row Name 20 09          PT Assessment    Assessment Comments  Maintained current treatment plan due to reports of increased soreness. Pt tolerated well.  -KM        PT Plan    PT Plan Comments  Continue per POC  -KM       User Key  (r) = Recorded By, (t) = Taken By, (c) = Cosigned By    Initials Name Provider Type    Taylor Oliva PTA Physical Therapy Assistant          Modalities     Row Name 20 09             Subjective Comments    Subjective Comments  Pt states she is sore either from the exercises or traction.   -KM         Moist Heat    MH Applied  Yes  -KM      Location  Lumbar/Cervical  -KM      Rx Minutes  10 mins  -KM         Traction 44749    Traction Type  Cervical  -KM      Rx Minutes  15  -KM      Position  Hook-lying  -KM      Weight  15  -KM      Hold  60  -KM      Relax  10  -KM        User Key  (r) = Recorded By, (t) = Taken By, (c) = Cosigned By    Initials Name Provider Type    Taylor Oliva PTA Physical Therapy Assistant        OP Exercises     Row Name 20 09             Subjective Comments    Subjective Comments  Pt states she is sore either from the exercises or traction.   -KM         Exercise 1    Exercise Name 1  Hamstring stretch with ADD-bilateral  -KM      Reps 1  10  -KM      Time 1  10 sec hold each  -KM      Reps 1  10  -KM         Exercise 2    Exercise Name 2  Piriformis stretch-bilateral  -KM      Reps 2  10  -KM      Time 2  10 sec hold each   -KM      Reps 2  10  -KM         Exercise 3    Exercise Name 3  Supine Clamshells  -KM      Reps 3  25  -KM      Time 3  Black  -KM         Exercise 4    Exercise Name 4  Bridge vs Band  -KM      Reps 4  25  -KM      Time 4  Black  -KM      Reps 4  20  -KM         Exercise 5    Exercise Name 5  PPT  -KM      Reps 5  25  -KM      Time 5  5 sec hold each  -KM      Reps 5  30  -KM         Exercise 6    Exercise Name 6  PPT with Ball Squeeze  -KM      Reps 6  25  -KM      Time 6  5 sec hold each  -KM      Reps 6  20  -KM         Exercise 7    Exercise Name 7  PPT with Clamshell  -KM         Exercise 8    Exercise Name 8  AP mobilizations L3, L4, L5  -KM      Time 8  3 min  -KM         Exercise 11    Exercise Name 11  [place]. UT/ Levator Stretch  -KM      Reps 11  10  -KM      Time 11  10 sec hold each  -KM        User Key  (r) = Recorded By, (t) = Taken By, (c) = Cosigned By    Initials Name Provider Type    Taylor Oliva PTA Physical Therapy Assistant                                          Time Calculation:   Start Time: 0900  Stop Time: 1000  Time Calculation (min): 60 min  Therapy Charges for Today     Code Description Service Date Service Provider Modifiers Qty    71317681585 HC PT THER PROC EA 15 MIN 9/24/2020 Taylor Albright PTA GP 1    38310620599 HC PT TRACTION CERVICAL 9/24/2020 Taylor Albright PTA GP 1                    Taylor Albright PTA  9/24/2020      pt denies changes in vision

## 2020-12-21 ENCOUNTER — APPOINTMENT (OUTPATIENT)
Dept: NEUROLOGY | Facility: CLINIC | Age: 80
End: 2020-12-21
Payer: MEDICARE

## 2020-12-21 VITALS
DIASTOLIC BLOOD PRESSURE: 80 MMHG | SYSTOLIC BLOOD PRESSURE: 140 MMHG | BODY MASS INDEX: 29.82 KG/M2 | HEIGHT: 61.5 IN | WEIGHT: 160 LBS

## 2020-12-21 PROCEDURE — 99204 OFFICE O/P NEW MOD 45 MIN: CPT

## 2020-12-21 PROCEDURE — 99072 ADDL SUPL MATRL&STAF TM PHE: CPT

## 2021-01-06 ENCOUNTER — NON-APPOINTMENT (OUTPATIENT)
Age: 81
End: 2021-01-06

## 2021-02-01 ENCOUNTER — APPOINTMENT (OUTPATIENT)
Dept: NEUROLOGY | Facility: CLINIC | Age: 81
End: 2021-02-01

## 2021-03-12 ENCOUNTER — APPOINTMENT (OUTPATIENT)
Dept: NEUROLOGY | Facility: CLINIC | Age: 81
End: 2021-03-12
Payer: MEDICARE

## 2021-03-12 VITALS
BODY MASS INDEX: 30.21 KG/M2 | WEIGHT: 160 LBS | SYSTOLIC BLOOD PRESSURE: 140 MMHG | TEMPERATURE: 97.3 F | HEIGHT: 61 IN | DIASTOLIC BLOOD PRESSURE: 85 MMHG

## 2021-03-12 PROCEDURE — 99214 OFFICE O/P EST MOD 30 MIN: CPT

## 2021-03-12 PROCEDURE — 99072 ADDL SUPL MATRL&STAF TM PHE: CPT

## 2021-04-15 ENCOUNTER — APPOINTMENT (OUTPATIENT)
Dept: NEUROLOGY | Facility: CLINIC | Age: 81
End: 2021-04-15
Payer: MEDICARE

## 2021-04-15 VITALS
TEMPERATURE: 97.3 F | DIASTOLIC BLOOD PRESSURE: 100 MMHG | BODY MASS INDEX: 30.21 KG/M2 | HEIGHT: 61 IN | WEIGHT: 160 LBS | SYSTOLIC BLOOD PRESSURE: 160 MMHG

## 2021-04-15 PROCEDURE — 99072 ADDL SUPL MATRL&STAF TM PHE: CPT

## 2021-04-15 PROCEDURE — 99214 OFFICE O/P EST MOD 30 MIN: CPT

## 2021-05-14 ENCOUNTER — APPOINTMENT (OUTPATIENT)
Dept: NEUROLOGY | Facility: CLINIC | Age: 81
End: 2021-05-14
Payer: MEDICARE

## 2021-05-14 VITALS — TEMPERATURE: 97.1 F | WEIGHT: 160 LBS | HEIGHT: 61 IN | BODY MASS INDEX: 30.21 KG/M2

## 2021-05-14 PROCEDURE — 99072 ADDL SUPL MATRL&STAF TM PHE: CPT

## 2021-05-14 PROCEDURE — 99214 OFFICE O/P EST MOD 30 MIN: CPT

## 2021-06-07 ENCOUNTER — EMERGENCY (EMERGENCY)
Facility: HOSPITAL | Age: 81
LOS: 1 days | Discharge: HOME | End: 2021-06-07
Attending: STUDENT IN AN ORGANIZED HEALTH CARE EDUCATION/TRAINING PROGRAM
Payer: COMMERCIAL

## 2021-06-07 VITALS
HEIGHT: 61.5 IN | HEART RATE: 84 BPM | OXYGEN SATURATION: 94 % | TEMPERATURE: 99 F | RESPIRATION RATE: 20 BRPM | WEIGHT: 162.48 LBS | DIASTOLIC BLOOD PRESSURE: 105 MMHG | SYSTOLIC BLOOD PRESSURE: 181 MMHG

## 2021-06-07 VITALS — HEART RATE: 82 BPM | SYSTOLIC BLOOD PRESSURE: 170 MMHG | DIASTOLIC BLOOD PRESSURE: 72 MMHG

## 2021-06-07 DIAGNOSIS — Z90.89 ACQUIRED ABSENCE OF OTHER ORGANS: Chronic | ICD-10-CM

## 2021-06-07 DIAGNOSIS — Z98.890 OTHER SPECIFIED POSTPROCEDURAL STATES: Chronic | ICD-10-CM

## 2021-06-07 DIAGNOSIS — Z90.710 ACQUIRED ABSENCE OF BOTH CERVIX AND UTERUS: Chronic | ICD-10-CM

## 2021-06-07 DIAGNOSIS — Z98.41 CATARACT EXTRACTION STATUS, RIGHT EYE: Chronic | ICD-10-CM

## 2021-06-07 DIAGNOSIS — Z96.659 PRESENCE OF UNSPECIFIED ARTIFICIAL KNEE JOINT: Chronic | ICD-10-CM

## 2021-06-07 DIAGNOSIS — Z96.652 PRESENCE OF LEFT ARTIFICIAL KNEE JOINT: Chronic | ICD-10-CM

## 2021-06-07 LAB
ALBUMIN SERPL ELPH-MCNC: 4.1 G/DL — SIGNIFICANT CHANGE UP (ref 3.3–5.2)
ALP SERPL-CCNC: 79 U/L — SIGNIFICANT CHANGE UP (ref 40–120)
ALT FLD-CCNC: 19 U/L — SIGNIFICANT CHANGE UP
ANION GAP SERPL CALC-SCNC: 9 MMOL/L — SIGNIFICANT CHANGE UP (ref 5–17)
AST SERPL-CCNC: 24 U/L — SIGNIFICANT CHANGE UP
BASOPHILS # BLD AUTO: 0.04 K/UL — SIGNIFICANT CHANGE UP (ref 0–0.2)
BASOPHILS NFR BLD AUTO: 0.5 % — SIGNIFICANT CHANGE UP (ref 0–2)
BILIRUB SERPL-MCNC: <0.2 MG/DL — LOW (ref 0.4–2)
BUN SERPL-MCNC: 14.4 MG/DL — SIGNIFICANT CHANGE UP (ref 8–20)
CALCIUM SERPL-MCNC: 10.2 MG/DL — SIGNIFICANT CHANGE UP (ref 8.6–10.2)
CHLORIDE SERPL-SCNC: 106 MMOL/L — SIGNIFICANT CHANGE UP (ref 98–107)
CO2 SERPL-SCNC: 29 MMOL/L — SIGNIFICANT CHANGE UP (ref 22–29)
CREAT SERPL-MCNC: 0.86 MG/DL — SIGNIFICANT CHANGE UP (ref 0.5–1.3)
EOSINOPHIL # BLD AUTO: 0.32 K/UL — SIGNIFICANT CHANGE UP (ref 0–0.5)
EOSINOPHIL NFR BLD AUTO: 4.1 % — SIGNIFICANT CHANGE UP (ref 0–6)
GLUCOSE SERPL-MCNC: 128 MG/DL — HIGH (ref 70–99)
HCT VFR BLD CALC: 39.1 % — SIGNIFICANT CHANGE UP (ref 34.5–45)
HGB BLD-MCNC: 13.3 G/DL — SIGNIFICANT CHANGE UP (ref 11.5–15.5)
IMM GRANULOCYTES NFR BLD AUTO: 0.4 % — SIGNIFICANT CHANGE UP (ref 0–1.5)
LYMPHOCYTES # BLD AUTO: 1.97 K/UL — SIGNIFICANT CHANGE UP (ref 1–3.3)
LYMPHOCYTES # BLD AUTO: 25.3 % — SIGNIFICANT CHANGE UP (ref 13–44)
MCHC RBC-ENTMCNC: 32.4 PG — SIGNIFICANT CHANGE UP (ref 27–34)
MCHC RBC-ENTMCNC: 34 GM/DL — SIGNIFICANT CHANGE UP (ref 32–36)
MCV RBC AUTO: 95.4 FL — SIGNIFICANT CHANGE UP (ref 80–100)
MONOCYTES # BLD AUTO: 0.75 K/UL — SIGNIFICANT CHANGE UP (ref 0–0.9)
MONOCYTES NFR BLD AUTO: 9.6 % — SIGNIFICANT CHANGE UP (ref 2–14)
NEUTROPHILS # BLD AUTO: 4.67 K/UL — SIGNIFICANT CHANGE UP (ref 1.8–7.4)
NEUTROPHILS NFR BLD AUTO: 60.1 % — SIGNIFICANT CHANGE UP (ref 43–77)
PLATELET # BLD AUTO: 326 K/UL — SIGNIFICANT CHANGE UP (ref 150–400)
POTASSIUM SERPL-MCNC: 4.4 MMOL/L — SIGNIFICANT CHANGE UP (ref 3.5–5.3)
POTASSIUM SERPL-SCNC: 4.4 MMOL/L — SIGNIFICANT CHANGE UP (ref 3.5–5.3)
PROT SERPL-MCNC: 7.2 G/DL — SIGNIFICANT CHANGE UP (ref 6.6–8.7)
RBC # BLD: 4.1 M/UL — SIGNIFICANT CHANGE UP (ref 3.8–5.2)
RBC # FLD: 15.4 % — HIGH (ref 10.3–14.5)
SODIUM SERPL-SCNC: 144 MMOL/L — SIGNIFICANT CHANGE UP (ref 135–145)
TROPONIN T SERPL-MCNC: <0.01 NG/ML — SIGNIFICANT CHANGE UP (ref 0–0.06)
WBC # BLD: 7.78 K/UL — SIGNIFICANT CHANGE UP (ref 3.8–10.5)
WBC # FLD AUTO: 7.78 K/UL — SIGNIFICANT CHANGE UP (ref 3.8–10.5)

## 2021-06-07 PROCEDURE — 72125 CT NECK SPINE W/O DYE: CPT

## 2021-06-07 PROCEDURE — 80053 COMPREHEN METABOLIC PANEL: CPT

## 2021-06-07 PROCEDURE — 85025 COMPLETE CBC W/AUTO DIFF WBC: CPT

## 2021-06-07 PROCEDURE — 99285 EMERGENCY DEPT VISIT HI MDM: CPT

## 2021-06-07 PROCEDURE — 84484 ASSAY OF TROPONIN QUANT: CPT

## 2021-06-07 PROCEDURE — 93010 ELECTROCARDIOGRAM REPORT: CPT

## 2021-06-07 PROCEDURE — 71045 X-RAY EXAM CHEST 1 VIEW: CPT

## 2021-06-07 PROCEDURE — 36415 COLL VENOUS BLD VENIPUNCTURE: CPT

## 2021-06-07 PROCEDURE — 93005 ELECTROCARDIOGRAM TRACING: CPT

## 2021-06-07 PROCEDURE — 82962 GLUCOSE BLOOD TEST: CPT

## 2021-06-07 PROCEDURE — 70450 CT HEAD/BRAIN W/O DYE: CPT

## 2021-06-07 PROCEDURE — 99284 EMERGENCY DEPT VISIT MOD MDM: CPT | Mod: 25

## 2021-06-07 PROCEDURE — 72125 CT NECK SPINE W/O DYE: CPT | Mod: 26

## 2021-06-07 PROCEDURE — 71045 X-RAY EXAM CHEST 1 VIEW: CPT | Mod: 26

## 2021-06-07 PROCEDURE — 70450 CT HEAD/BRAIN W/O DYE: CPT | Mod: 26

## 2021-06-07 RX ORDER — THEOPHYLLINE ANHYDROUS 200 MG
100 TABLET, EXTENDED RELEASE 12 HR ORAL ONCE
Refills: 0 | Status: COMPLETED | OUTPATIENT
Start: 2021-06-07 | End: 2021-06-07

## 2021-06-07 RX ADMIN — Medication 100 MILLIGRAM(S): at 21:10

## 2021-06-07 NOTE — ED ADULT NURSE NOTE - NSIMPLEMENTINTERV_GEN_ALL_ED
Implemented All Fall Risk Interventions:  Rachel to call system. Call bell, personal items and telephone within reach. Instruct patient to call for assistance. Room bathroom lighting operational. Non-slip footwear when patient is off stretcher. Physically safe environment: no spills, clutter or unnecessary equipment. Stretcher in lowest position, wheels locked, appropriate side rails in place. Provide visual cue, wrist band, yellow gown, etc. Monitor gait and stability. Monitor for mental status changes and reorient to person, place, and time. Review medications for side effects contributing to fall risk. Reinforce activity limits and safety measures with patient and family.

## 2021-06-07 NOTE — ED PROVIDER NOTE - PHYSICAL EXAMINATION
General: well appearing, interactive, well nourished, NAD  HEENT: hematoma on right occiput, pupils equal and reactive, normal external ears bilaterally   Cardiac: RRR, no MRG appreciated  Resp: lungs clear to auscultation bilaterally, symmetric chest wall rise  Abd: soft, nontender, nondistended,   : no CVA tenderness  Neuro: Moving all extremities, CN 2-12 grossly intact, no focal neurological deficits  Skin:  normal color for race

## 2021-06-07 NOTE — ED PROVIDER NOTE - PATIENT PORTAL LINK FT
You can access the FollowMyHealth Patient Portal offered by [facility] by registering at the following website: [email or web address]. By joining [employer]’s FollowMyHealth portal, you will also be able to view your health information using other applications (apps) compatible with our system.

## 2021-06-07 NOTE — ED PROVIDER NOTE - NSFOLLOWUPINSTRUCTIONS_ED_ALL_ED_FT
1) Follow up with your doctor in 1-2 days  2) Return to the ER for worsening or concerning symptoms      Head Injury, Adult    There are many types of head injuries. They can be as minor as a small bump. Some head injuries can be worse. Worse injuries include:  •A strong hit to the head that shakes the brain back and forth, causing damage (concussion).      •A bruise (contusion) of the brain. This means there is bleeding in the brain that can cause swelling.      •A cracked skull (skull fracture).      •Bleeding in the brain that gathers, gets thick (makes a clot), and forms a bump (hematoma).      Most problems from a head injury come in the first 24 hours. However, you may still have side effects up to 7–10 days after your injury. It is important to watch your condition for any changes. You may need to be watched in the emergency department or urgent care, or you may need to stay in the hospital.      What are the causes?  There are many possible causes of a head injury. A serious head injury may be caused by:  •A car accident.      •Bicycle or motorcycle accidents.      •Sports injuries.      •Falls.      •Being hit by an object.        What are the signs or symptoms?  Symptoms of a head injury include a bruise, bump, or bleeding where the injury happened. Other physical symptoms may include:  •Headache.      •Feeling like you may vomit (nauseous) or vomiting.      •Dizziness.      •Blurred or double vision.      •Being uncomfortable around bright lights or loud noises.      •Shaking movements that you cannot control (seizures).      •Feeling tired.      •Trouble being woken up.      •Fainting or loss of consciousness.    Mental or emotional symptoms may include:  •Feeling grumpy or cranky.      •Confusion and memory problems.      •Having trouble paying attention or concentrating.      •Changes in eating or sleeping habits.      •Feeling worried or nervous (anxious).      •Feeling sad (depressed).        How is this treated?    Treatment for this condition depends on how severe the injury is and the type of injury you have. The main goal is to prevent problems and to allow the brain time to heal.    Mild head injury   If you have a mild head injury, you may be sent home, and treatment may include:  •Being watched. A responsible adult should stay with you for 24 hours after your injury and check on you often.      •Physical rest.      •Brain rest.      •Pain medicines.      Severe head injury  If you have a severe head injury, treatment may include:  •Being watched closely. This includes staying in the hospital.    •Medicines to:  •Help with pain.      •Prevent seizures.      •Help with brain swelling.        •Protecting your airway and using a machine that helps you breathe (ventilator).      •Treatments to watch for and manage swelling inside the brain.    •Brain surgery. This may be needed to:  •Remove a collection of blood or blood clots.      •Stop the bleeding.      •Remove a part of the skull. This allows room for the brain to swell.          Follow these instructions at home:    Activity     •Rest.      •Avoid activities that are hard or tiring.      •Make sure you get enough sleep.    •Let your brain rest. Do this by limiting activities that need a lot of thought or attention, such as:  •Watching TV.      •Playing memory games and puzzles.      •Job-related work or homework.      •Working on the computer, social media, and texting.        •Avoid activities that could cause another head injury until your doctor says it is okay. This includes playing sports. Having another head injury, especially before the first one has healed, can be dangerous.      •Ask your doctor when it is safe for you to go back to your normal activities, such as work or school. Ask your doctor for a step-by-step plan for slowly going back to your normal activities.      •Ask your doctor when you can drive, ride a bicycle, or use heavy machinery. Do not do these activities if you are dizzy.        Lifestyle      • Do not drink alcohol until your doctor says it is okay.      • Do not use drugs.      •If it is harder than usual to remember things, write them down.      •If you are easily distracted, try to do one thing at a time.      •Talk with family members or close friends when making important decisions.      •Tell your friends, family, a trusted co-worker, and  about your injury, symptoms, and limits (restrictions). Have them watch for any problems that are new or getting worse.      General instructions     •Take over-the-counter and prescription medicines only as told by your doctor.      •Have someone stay with you for 24 hours after your head injury. This person should watch you for any changes in your symptoms and be ready to get help.      •Keep all follow-up visits as told by your doctor. This is important.      How is this prevented?     •Work on your balance and strength. This can help you avoid falls.      •Wear a seat belt when you are in a moving vehicle.    •Wear a helmet when you:  •Ride a bicycle.      •Ski.      •Do any other sport or activity that has a risk of injury.      •If you drink alcohol:•Limit how much you use to:  •0–1 drink a day for nonpregnant women.      •0–2 drinks a day for men.        •Be aware of how much alcohol is in your drink. In the [place] one drink equals one 12 oz bottle of beer (355 mL), one 5 oz glass of wine (148 mL), or one 1½ oz glass of hard liquor (44 mL).      •Make your home safer by:  •Getting rid of clutter from the floors and stairs. This includes things that can make you trip.      •Using grab bars in bathrooms and handrails by stairs.      •Placing non-slip mats on floors and in bathtubs.      •Putting more light in dim areas.          Where to find more information    •Centers for Disease Control and Prevention: www.cdc.gov        Get help right away if:  •You have:  •A very bad headache that is not helped by medicine.      •Trouble walking or weakness in your arms and legs.      •Clear or bloody fluid coming from your nose or ears.      •Changes in how you see (vision).      •A seizure.      •More confusion or more grumpy moods.        •Your symptoms get worse.      •You are sleepier than normal and have trouble staying awake.      •You lose your balance.      •The black centers of your eyes (pupils) change in size.      •Your speech is slurred.      •Your dizziness gets worse.      •You vomit.      These symptoms may be an emergency. Do not wait to see if the symptoms will go away. Get medical help right away. Call your local emergency services (911 in the [place] Do not drive yourself to the hospital.       Summary    •Head injuries can be as minor as a small bump. Some head injuries can be worse.      •Treatment for this condition depends on how severe the injury is and the type of injury you have.      •Have someone stay with you for 24 hours after your head injury.      •Ask your doctor when it is safe for you to go back to your normal activities, such as work or school.      •To prevent a head injury, wear a seat belt in a car, wear a helmet when you use a bicycle, limit your alcohol use, and make your home safer.      This information is not intended to replace advice given to you by your health care provider. Make sure you discuss any questions you have with your health care provider.

## 2021-06-07 NOTE — ED ADULT NURSE NOTE - OBJECTIVE STATEMENT
Pt s/p trip and fall at home. She hit her head, denies any LOC or syncopal episode. A&O x's 3 at this time.

## 2021-06-07 NOTE — ED ADULT TRIAGE NOTE - CHIEF COMPLAINT QUOTE
pt a+ox3, BIBA s/p trip and fall at home. reports hitting head, denies LOC. denies daily blood thinners. pt offers no complaints at this time, no obvious signs of trauma.

## 2021-06-07 NOTE — ED PROVIDER NOTE - OBJECTIVE STATEMENT
Pt is a 79 y/o F w/PMHx HTN, vertigo, asthma presents c/o fall.  Pt states she was walking around her house collecting trash when she lost her balance and feel hitting her head on the floor.  Pt states she has been feeling off balance all day, and has been pretty active, going to a doctors appt, and to a .  Pt was able to get up when she crawled to a couch, and her dizziness has been resolving.  She has no acute concerns at this time and would like to go home.  Pt denies headache, chest pain, shortness of breath, abdominal pain.

## 2021-06-07 NOTE — ED PROVIDER NOTE - ATTENDING CONTRIBUTION TO CARE
81 yo female presents for evaluation s/p trip and fall at home with head trauma. Patient states she attended an interment today in the sun and since has felt a little light headed. Patient pending 79 yo female presents for evaluation s/p trip and fall at home with head trauma. Patient states she attended an internment today in the sun and since has felt a little light headed. Patient states she was walking when the dizziness became a little more intense causing her to mis-step and fall. She had difficulty getting up prompting call to her neighbor. Patient states she had no other symptoms or complaints. Currently denies any pain but feels pressure to the right scalp. I personally saw the patient with the resident, and completed the key components of the history and physical exam. I then discussed the management plan with the resident.

## 2021-06-07 NOTE — ED ADULT TRIAGE NOTE - BMI (KG/M2)
1. To decrease your blood pressure:   1) decrease your salt in your diet   2) increase greens   3) decrease red meat   4) increase fiber in diet   5) increase exercise    2. , Weight Loss Tips  1. Do not eat after 6 hrs before your expected bedtime  2. Have your heaviest meal for breakfast, a slightly lighter meal at lunch and a snack 6 hrs before bed  3. No sugar/calorie drinks except milk ie no fruit juice, pop, alcohol.  4. Drink milk 30min before meals to decrease your hunger. Also it is excellent as part of your last meal of the day snack  5. Drink lots of water  6. Increase fiber in diet: all bran cereal, salads, popcorn etc  7. Have only one small serving of fruit a day about 1/2 cup (as this is high in sugar)  8. EXERCISE is the bottom line. Without it, you will gain weight even on a low calorie diet. Best if done 2-3X a day as can    Being overweight contributes to high blood pressure and high cholesterol, both of which cause heart attacks, strokes and kidney failure, prediabetes and diabetes, arthritis, and liver disease     3. Your blood pressure has been high or you are starting a new medication for it :   Please take 2-4 blood pressures and heart rates a week and write them down with date, time of day and location and bring this record in in 3-5 weeks. The optimal blood  pressure is < 140/80 or close to this most of the time.  chek every wk or 2  And call us if > 140/85     4. Flex ankles amap     5. Please stop the alcohol    6. See me in 5 mo   30.2

## 2021-07-14 ENCOUNTER — APPOINTMENT (OUTPATIENT)
Dept: NEUROLOGY | Facility: CLINIC | Age: 81
End: 2021-07-14
Payer: MEDICARE

## 2021-07-14 VITALS
HEIGHT: 61 IN | SYSTOLIC BLOOD PRESSURE: 160 MMHG | WEIGHT: 160 LBS | BODY MASS INDEX: 30.21 KG/M2 | DIASTOLIC BLOOD PRESSURE: 82 MMHG

## 2021-07-14 PROCEDURE — 99214 OFFICE O/P EST MOD 30 MIN: CPT

## 2021-07-23 NOTE — DISCHARGE NOTE ADULT - CARE PROVIDER_API CALL
not applicable Salo Sewell (MD), Orthopaedic Surgery  [address]  [place] [address]  Phone: [phone]  Fax: [phone]

## 2021-11-15 ENCOUNTER — APPOINTMENT (OUTPATIENT)
Dept: NEUROLOGY | Facility: CLINIC | Age: 81
End: 2021-11-15
Payer: MEDICARE

## 2021-11-15 VITALS
DIASTOLIC BLOOD PRESSURE: 90 MMHG | WEIGHT: 166 LBS | BODY MASS INDEX: 31.34 KG/M2 | SYSTOLIC BLOOD PRESSURE: 130 MMHG | HEIGHT: 61 IN

## 2021-11-15 PROCEDURE — 99213 OFFICE O/P EST LOW 20 MIN: CPT

## 2021-12-11 ENCOUNTER — INPATIENT (INPATIENT)
Facility: HOSPITAL | Age: 81
LOS: 1 days | Discharge: HOME | DRG: 556 | End: 2021-12-13
Attending: HOSPITALIST | Admitting: HOSPITALIST
Payer: COMMERCIAL

## 2021-12-11 VITALS
DIASTOLIC BLOOD PRESSURE: 84 MMHG | OXYGEN SATURATION: 96 % | HEART RATE: 69 BPM | HEIGHT: 61.5 IN | SYSTOLIC BLOOD PRESSURE: 164 MMHG | TEMPERATURE: 99 F | RESPIRATION RATE: 20 BRPM | WEIGHT: 154.1 LBS

## 2021-12-11 DIAGNOSIS — Z98.890 OTHER SPECIFIED POSTPROCEDURAL STATES: Chronic | ICD-10-CM

## 2021-12-11 DIAGNOSIS — Z90.89 ACQUIRED ABSENCE OF OTHER ORGANS: Chronic | ICD-10-CM

## 2021-12-11 DIAGNOSIS — Z96.659 PRESENCE OF UNSPECIFIED ARTIFICIAL KNEE JOINT: Chronic | ICD-10-CM

## 2021-12-11 DIAGNOSIS — Z98.41 CATARACT EXTRACTION STATUS, RIGHT EYE: Chronic | ICD-10-CM

## 2021-12-11 DIAGNOSIS — Z90.710 ACQUIRED ABSENCE OF BOTH CERVIX AND UTERUS: Chronic | ICD-10-CM

## 2021-12-11 DIAGNOSIS — Z96.652 PRESENCE OF LEFT ARTIFICIAL KNEE JOINT: Chronic | ICD-10-CM

## 2021-12-11 LAB
ALBUMIN SERPL ELPH-MCNC: 4 G/DL — SIGNIFICANT CHANGE UP (ref 3.3–5.2)
ALP SERPL-CCNC: 86 U/L — SIGNIFICANT CHANGE UP (ref 40–120)
ALT FLD-CCNC: 18 U/L — SIGNIFICANT CHANGE UP
ANION GAP SERPL CALC-SCNC: 14 MMOL/L — SIGNIFICANT CHANGE UP (ref 5–17)
AST SERPL-CCNC: 19 U/L — SIGNIFICANT CHANGE UP
BASOPHILS # BLD AUTO: 0.04 K/UL — SIGNIFICANT CHANGE UP (ref 0–0.2)
BASOPHILS NFR BLD AUTO: 0.5 % — SIGNIFICANT CHANGE UP (ref 0–2)
BILIRUB SERPL-MCNC: 0.3 MG/DL — LOW (ref 0.4–2)
BUN SERPL-MCNC: 12.2 MG/DL — SIGNIFICANT CHANGE UP (ref 8–20)
CALCIUM SERPL-MCNC: 8.9 MG/DL — SIGNIFICANT CHANGE UP (ref 8.6–10.2)
CHLORIDE SERPL-SCNC: 105 MMOL/L — SIGNIFICANT CHANGE UP (ref 98–107)
CO2 SERPL-SCNC: 24 MMOL/L — SIGNIFICANT CHANGE UP (ref 22–29)
CREAT SERPL-MCNC: 0.56 MG/DL — SIGNIFICANT CHANGE UP (ref 0.5–1.3)
EOSINOPHIL # BLD AUTO: 0.34 K/UL — SIGNIFICANT CHANGE UP (ref 0–0.5)
EOSINOPHIL NFR BLD AUTO: 4.3 % — SIGNIFICANT CHANGE UP (ref 0–6)
GLUCOSE SERPL-MCNC: 121 MG/DL — HIGH (ref 70–99)
HCT VFR BLD CALC: 39.4 % — SIGNIFICANT CHANGE UP (ref 34.5–45)
HGB BLD-MCNC: 13.2 G/DL — SIGNIFICANT CHANGE UP (ref 11.5–15.5)
IMM GRANULOCYTES NFR BLD AUTO: 0.3 % — SIGNIFICANT CHANGE UP (ref 0–1.5)
LYMPHOCYTES # BLD AUTO: 2.67 K/UL — SIGNIFICANT CHANGE UP (ref 1–3.3)
LYMPHOCYTES # BLD AUTO: 33.7 % — SIGNIFICANT CHANGE UP (ref 13–44)
MCHC RBC-ENTMCNC: 31.7 PG — SIGNIFICANT CHANGE UP (ref 27–34)
MCHC RBC-ENTMCNC: 33.5 GM/DL — SIGNIFICANT CHANGE UP (ref 32–36)
MCV RBC AUTO: 94.7 FL — SIGNIFICANT CHANGE UP (ref 80–100)
MONOCYTES # BLD AUTO: 0.87 K/UL — SIGNIFICANT CHANGE UP (ref 0–0.9)
MONOCYTES NFR BLD AUTO: 11 % — SIGNIFICANT CHANGE UP (ref 2–14)
NEUTROPHILS # BLD AUTO: 3.99 K/UL — SIGNIFICANT CHANGE UP (ref 1.8–7.4)
NEUTROPHILS NFR BLD AUTO: 50.2 % — SIGNIFICANT CHANGE UP (ref 43–77)
PLATELET # BLD AUTO: 301 K/UL — SIGNIFICANT CHANGE UP (ref 150–400)
POTASSIUM SERPL-MCNC: 3.8 MMOL/L — SIGNIFICANT CHANGE UP (ref 3.5–5.3)
POTASSIUM SERPL-SCNC: 3.8 MMOL/L — SIGNIFICANT CHANGE UP (ref 3.5–5.3)
PROT SERPL-MCNC: 7.2 G/DL — SIGNIFICANT CHANGE UP (ref 6.6–8.7)
RBC # BLD: 4.16 M/UL — SIGNIFICANT CHANGE UP (ref 3.8–5.2)
RBC # FLD: 16.8 % — HIGH (ref 10.3–14.5)
SODIUM SERPL-SCNC: 142 MMOL/L — SIGNIFICANT CHANGE UP (ref 135–145)
WBC # BLD: 7.93 K/UL — SIGNIFICANT CHANGE UP (ref 3.8–10.5)
WBC # FLD AUTO: 7.93 K/UL — SIGNIFICANT CHANGE UP (ref 3.8–10.5)

## 2021-12-11 PROCEDURE — 71045 X-RAY EXAM CHEST 1 VIEW: CPT | Mod: 26

## 2021-12-11 PROCEDURE — 93010 ELECTROCARDIOGRAM REPORT: CPT

## 2021-12-11 PROCEDURE — 72131 CT LUMBAR SPINE W/O DYE: CPT | Mod: 26,MA

## 2021-12-11 PROCEDURE — 73600 X-RAY EXAM OF ANKLE: CPT | Mod: 26,RT

## 2021-12-11 PROCEDURE — 73560 X-RAY EXAM OF KNEE 1 OR 2: CPT | Mod: 26,RT

## 2021-12-11 PROCEDURE — 73590 X-RAY EXAM OF LOWER LEG: CPT | Mod: 26,RT

## 2021-12-11 PROCEDURE — 73521 X-RAY EXAM HIPS BI 2 VIEWS: CPT | Mod: 26

## 2021-12-11 PROCEDURE — 99236 HOSP IP/OBS SAME DATE HI 85: CPT

## 2021-12-11 PROCEDURE — 70450 CT HEAD/BRAIN W/O DYE: CPT | Mod: 26,MA

## 2021-12-11 RX ORDER — ALBUTEROL 90 UG/1
2 AEROSOL, METERED ORAL EVERY 6 HOURS
Refills: 0 | Status: DISCONTINUED | OUTPATIENT
Start: 2021-12-11 | End: 2021-12-13

## 2021-12-11 RX ORDER — ALBUTEROL 90 UG/1
1 AEROSOL, METERED ORAL ONCE
Refills: 0 | Status: COMPLETED | OUTPATIENT
Start: 2021-12-11 | End: 2021-12-11

## 2021-12-11 RX ORDER — ACETAMINOPHEN 500 MG
650 TABLET ORAL ONCE
Refills: 0 | Status: COMPLETED | OUTPATIENT
Start: 2021-12-11 | End: 2021-12-11

## 2021-12-11 RX ORDER — FAMOTIDINE 10 MG/ML
20 INJECTION INTRAVENOUS ONCE
Refills: 0 | Status: COMPLETED | OUTPATIENT
Start: 2021-12-11 | End: 2021-12-12

## 2021-12-11 RX ORDER — ENOXAPARIN SODIUM 100 MG/ML
40 INJECTION SUBCUTANEOUS DAILY
Refills: 0 | Status: DISCONTINUED | OUTPATIENT
Start: 2021-12-11 | End: 2021-12-13

## 2021-12-11 RX ORDER — THEOPHYLLINE ANHYDROUS 200 MG
300 TABLET, EXTENDED RELEASE 12 HR ORAL DAILY
Refills: 0 | Status: DISCONTINUED | OUTPATIENT
Start: 2021-12-11 | End: 2021-12-13

## 2021-12-11 RX ORDER — ALBUTEROL 90 UG/1
2.5 AEROSOL, METERED ORAL ONCE
Refills: 0 | Status: COMPLETED | OUTPATIENT
Start: 2021-12-11 | End: 2021-12-11

## 2021-12-11 RX ORDER — BUDESONIDE AND FORMOTEROL FUMARATE DIHYDRATE 160; 4.5 UG/1; UG/1
2 AEROSOL RESPIRATORY (INHALATION)
Refills: 0 | Status: DISCONTINUED | OUTPATIENT
Start: 2021-12-11 | End: 2021-12-13

## 2021-12-11 RX ORDER — LOSARTAN POTASSIUM 100 MG/1
100 TABLET, FILM COATED ORAL DAILY
Refills: 0 | Status: DISCONTINUED | OUTPATIENT
Start: 2021-12-11 | End: 2021-12-13

## 2021-12-11 RX ORDER — GABAPENTIN 400 MG/1
300 CAPSULE ORAL AT BEDTIME
Refills: 0 | Status: DISCONTINUED | OUTPATIENT
Start: 2021-12-11 | End: 2021-12-13

## 2021-12-11 RX ADMIN — ALBUTEROL 1 PUFF(S): 90 AEROSOL, METERED ORAL at 17:14

## 2021-12-11 RX ADMIN — BUDESONIDE AND FORMOTEROL FUMARATE DIHYDRATE 2 PUFF(S): 160; 4.5 AEROSOL RESPIRATORY (INHALATION) at 22:19

## 2021-12-11 RX ADMIN — LOSARTAN POTASSIUM 100 MILLIGRAM(S): 100 TABLET, FILM COATED ORAL at 18:35

## 2021-12-11 RX ADMIN — GABAPENTIN 300 MILLIGRAM(S): 400 CAPSULE ORAL at 22:18

## 2021-12-11 RX ADMIN — Medication 300 MILLIGRAM(S): at 18:38

## 2021-12-11 RX ADMIN — Medication 650 MILLIGRAM(S): at 14:29

## 2021-12-11 RX ADMIN — Medication 650 MILLIGRAM(S): at 17:45

## 2021-12-11 NOTE — ED STATDOCS - NSICDXPASTMEDICALHX_GEN_ALL_CORE_FT
PAST MEDICAL HISTORY:  Anemia     Asthma     Colon polyps     Diabetes mellitus     Fibromyalgia     GERD (gastroesophageal reflux disease)     Glaucoma     Hypertension     Neuropathy     Osteoarthritis hip    Vertigo

## 2021-12-11 NOTE — ED ADULT NURSE NOTE - OBJECTIVE STATEMENT
AAOx3 c/o generalized pain s/p fall yesterday. Ambulatory with steady gait. Denies CP, SOB, NVD, HA, or dizziness. Well appearing. Awaiting MD armstrong and order. Will continue to monitor.

## 2021-12-11 NOTE — ED STATDOCS - PHYSICAL EXAMINATION
Gen: Well appearing in NAD  Head: NC/AT  Neck: Trachea midline  Resp: No distress  Back: No midline spinal tenderness C/T/L spine.   Ext: R shin TTP  Neuro: LLE sensation intact, 3-4/5 weakness knee flexion, hip flexion LLE. Plantarflexion/dorsiflexion 5/5. RLE 5/5 strength and sensation.   Skin: Warm and dry as visualized  Psych: Normal affect and mood

## 2021-12-11 NOTE — ED CDU PROVIDER INITIAL DAY NOTE - PHYSICAL EXAMINATION
General: no fever, A&O x 3  HEENT: airway patent  Respiratory: lungs CTA bilaterally  Cardiac: S1S2, regular rate and rhythm  Abdomen: abdomen non-tender  Musculoskeletal: no midline back tenderness, no C-spine tenderness, full ROM, no deformity  Neuro: weakness of left lower extremity  Skin: no lesions or rashes

## 2021-12-11 NOTE — ED ADULT NURSE REASSESSMENT NOTE - NURSING MUSC EXTREMITY NORMAL ROM
left upper extremity/right upper extremity/right lower extremity
+5/left upper extremity/right upper extremity/right lower extremity

## 2021-12-11 NOTE — ED STATDOCS - OBJECTIVE STATEMENT
Patient is an 80yo F presenting after a fall yesterday. She reports that she tripped over a carpet yesterday and was unable to get up. She states that she was helped to the couch and was unable to get up. She states she does not walk with assistive devices. She notes that today she was having some pain in the RLE but more concerning is that she was unable to move the LLE. She denies pain to the LLE. Denies hitting head, syncope, fevers, chills, chest pain, sob, nausea, vomiting, diarrhea. Endorses a history of hip replacement in the R, b/l knee replacements.

## 2021-12-11 NOTE — ED STATDOCS - ATTENDING CONTRIBUTION TO CARE
AJM: pt presenting s/p fall yesterday. She notes she tripped and fell forward. + pain in right leg. Also with weakness in left leg. she notes symptoms began after fall. Unable to walk since fall. On exam  pt with 3-4/5 motor in left leg but sensation intact. normal motor in right leg. b/l UE normal. CN 2-12 intact. ct head and l spine neg for acute issues. given weakness will obtain mri c, t, l spine, head, PT eval. Not tpa candidate given relation to trauma and timing onset of  yesterday.

## 2021-12-11 NOTE — ED STATDOCS - PROGRESS NOTE DETAILS
Patient imaging unremarkable. Reassessed and patient still with L leg weakness, unable to walk. Spoke with patient and unable to go home at this time. Will place in obs for MRI.

## 2021-12-11 NOTE — ED CDU PROVIDER INITIAL DAY NOTE - OBJECTIVE STATEMENT
80 y/o F c/o weakness of left lower leg since last night.  Patient states that symptoms started after a mechanical fall.  Patient states that she's unable to ambulate.  Denies pain, headache, fever, nausea/vomiting.

## 2021-12-11 NOTE — ED STATDOCS - NSICDXPASTSURGICALHX_GEN_ALL_CORE_FT
PAST SURGICAL HISTORY:  H/O total knee replacement bilateral 2017    History of bilateral cataract extraction     History of carpal tunnel surgery of left wrist     History of hysterectomy     History of left knee replacement     S/P tonsillectomy and adenoidectomy

## 2021-12-11 NOTE — ED CDU PROVIDER DISPOSITION NOTE - CLINICAL COURSE
80 y/o F c/o weakness of left lower leg since last night. Pt has persistent deficit on exam. Also c/o chest discomfort, ECG no stemi, trop negative. Will admit to stroke unit

## 2021-12-11 NOTE — ED ADULT TRIAGE NOTE - CHIEF COMPLAINT QUOTE
I tripped on the carpet last night, was able to walk to the bathroom afterwards. today im having more trouble with the left leg, hurts to walk

## 2021-12-11 NOTE — ED STATDOCS - CLINICAL SUMMARY MEDICAL DECISION MAKING FREE TEXT BOX
Patient with weakness and pain after a fall. Will evaluate with CT and xray imaging. Not requesting pain medications at this time.

## 2021-12-11 NOTE — ED CDU PROVIDER INITIAL DAY NOTE - NSICDXFAMILYHX_GEN_ALL_CORE_FT
FAMILY HISTORY:  Father  Still living? No  Family history of dementia, Age at diagnosis: Age Unknown  Family history of hypertension, Age at diagnosis: Age Unknown  Family history of prostate cancer, Age at diagnosis:     Mother  Still living? No  Family history of bone cancer, Age at diagnosis: 71-80    Sibling  Still living? No  Family history of AIDS, Age at diagnosis: 21-30    Child  Still living? Yes, Estimated age: Age Unknown  Family history of cerebral palsy, Age at diagnosis: Age Unknown

## 2021-12-11 NOTE — ED STATDOCS - NS ED ROS FT
General: Denies fever, chills  MSK: Endorses RLE pain  Resp: Denies cough, SOB  CV: Denies CP, palpitations  GI: Denies nausea, vomiting  Neuro: Endorses LLE weakness  Skin: Denies rashes, lacerations

## 2021-12-11 NOTE — ED CDU PROVIDER INITIAL DAY NOTE - NS ED ROS FT
General: no fever/chills  HEENT: no difficulty swallowing  Respiratory: no shortness of breath or cough  Cardiac: no chest pain or palpitations  Abdomen: no abdominal pain or vomiting  Musculoskeletal: no neck pain or back pain  Neuro: no LOC, no seizures,  Skin: no lesions or rashes

## 2021-12-12 DIAGNOSIS — R53.1 WEAKNESS: ICD-10-CM

## 2021-12-12 LAB
A1C WITH ESTIMATED AVERAGE GLUCOSE RESULT: 6 % — HIGH (ref 4–5.6)
APPEARANCE UR: CLEAR — SIGNIFICANT CHANGE UP
BACTERIA # UR AUTO: ABNORMAL
BILIRUB UR-MCNC: NEGATIVE — SIGNIFICANT CHANGE UP
CHOLEST SERPL-MCNC: 146 MG/DL — SIGNIFICANT CHANGE UP
COLOR SPEC: YELLOW — SIGNIFICANT CHANGE UP
DIFF PNL FLD: NEGATIVE — SIGNIFICANT CHANGE UP
EPI CELLS # UR: SIGNIFICANT CHANGE UP
ESTIMATED AVERAGE GLUCOSE: 126 MG/DL — HIGH (ref 68–114)
GLUCOSE BLDC GLUCOMTR-MCNC: 146 MG/DL — HIGH (ref 70–99)
GLUCOSE UR QL: NEGATIVE MG/DL — SIGNIFICANT CHANGE UP
HDLC SERPL-MCNC: 81 MG/DL — SIGNIFICANT CHANGE UP
KETONES UR-MCNC: NEGATIVE — SIGNIFICANT CHANGE UP
LEUKOCYTE ESTERASE UR-ACNC: NEGATIVE — SIGNIFICANT CHANGE UP
LIPID PNL WITH DIRECT LDL SERPL: 52 MG/DL — SIGNIFICANT CHANGE UP
NITRITE UR-MCNC: NEGATIVE — SIGNIFICANT CHANGE UP
NON HDL CHOLESTEROL: 65 MG/DL — SIGNIFICANT CHANGE UP
PH UR: 6.5 — SIGNIFICANT CHANGE UP (ref 5–8)
PROT UR-MCNC: 30 MG/DL
RBC CASTS # UR COMP ASSIST: NEGATIVE /HPF — SIGNIFICANT CHANGE UP (ref 0–4)
SARS-COV-2 RNA SPEC QL NAA+PROBE: SIGNIFICANT CHANGE UP
SP GR SPEC: 1.01 — SIGNIFICANT CHANGE UP (ref 1.01–1.02)
TRIGL SERPL-MCNC: 66 MG/DL — SIGNIFICANT CHANGE UP
TROPONIN T SERPL-MCNC: <0.01 NG/ML — SIGNIFICANT CHANGE UP (ref 0–0.06)
UROBILINOGEN FLD QL: NEGATIVE MG/DL — SIGNIFICANT CHANGE UP
WBC UR QL: NEGATIVE — SIGNIFICANT CHANGE UP

## 2021-12-12 PROCEDURE — 72148 MRI LUMBAR SPINE W/O DYE: CPT | Mod: 26

## 2021-12-12 PROCEDURE — 93306 TTE W/DOPPLER COMPLETE: CPT | Mod: 26

## 2021-12-12 PROCEDURE — 72141 MRI NECK SPINE W/O DYE: CPT | Mod: 26

## 2021-12-12 PROCEDURE — 73620 X-RAY EXAM OF FOOT: CPT | Mod: 26,LT

## 2021-12-12 PROCEDURE — 99222 1ST HOSP IP/OBS MODERATE 55: CPT

## 2021-12-12 PROCEDURE — 70551 MRI BRAIN STEM W/O DYE: CPT | Mod: 26

## 2021-12-12 PROCEDURE — 72146 MRI CHEST SPINE W/O DYE: CPT | Mod: 26

## 2021-12-12 RX ORDER — ASPIRIN/CALCIUM CARB/MAGNESIUM 324 MG
81 TABLET ORAL DAILY
Refills: 0 | Status: DISCONTINUED | OUTPATIENT
Start: 2021-12-12 | End: 2021-12-12

## 2021-12-12 RX ORDER — DEXTROSE 50 % IN WATER 50 %
15 SYRINGE (ML) INTRAVENOUS ONCE
Refills: 0 | Status: DISCONTINUED | OUTPATIENT
Start: 2021-12-12 | End: 2021-12-13

## 2021-12-12 RX ORDER — GLUCAGON INJECTION, SOLUTION 0.5 MG/.1ML
1 INJECTION, SOLUTION SUBCUTANEOUS ONCE
Refills: 0 | Status: DISCONTINUED | OUTPATIENT
Start: 2021-12-12 | End: 2021-12-13

## 2021-12-12 RX ORDER — MULTIVIT-MIN/FERROUS GLUCONATE 9 MG/15 ML
1 LIQUID (ML) ORAL DAILY
Refills: 0 | Status: DISCONTINUED | OUTPATIENT
Start: 2021-12-12 | End: 2021-12-13

## 2021-12-12 RX ORDER — OXYCODONE HYDROCHLORIDE 5 MG/1
5 TABLET ORAL ONCE
Refills: 0 | Status: DISCONTINUED | OUTPATIENT
Start: 2021-12-12 | End: 2021-12-12

## 2021-12-12 RX ORDER — SODIUM CHLORIDE 9 MG/ML
1000 INJECTION, SOLUTION INTRAVENOUS
Refills: 0 | Status: DISCONTINUED | OUTPATIENT
Start: 2021-12-12 | End: 2021-12-13

## 2021-12-12 RX ORDER — INSULIN LISPRO 100/ML
VIAL (ML) SUBCUTANEOUS AT BEDTIME
Refills: 0 | Status: DISCONTINUED | OUTPATIENT
Start: 2021-12-12 | End: 2021-12-13

## 2021-12-12 RX ORDER — DEXTROSE 50 % IN WATER 50 %
25 SYRINGE (ML) INTRAVENOUS ONCE
Refills: 0 | Status: DISCONTINUED | OUTPATIENT
Start: 2021-12-12 | End: 2021-12-13

## 2021-12-12 RX ORDER — INSULIN LISPRO 100/ML
VIAL (ML) SUBCUTANEOUS
Refills: 0 | Status: DISCONTINUED | OUTPATIENT
Start: 2021-12-12 | End: 2021-12-13

## 2021-12-12 RX ORDER — ATORVASTATIN CALCIUM 80 MG/1
40 TABLET, FILM COATED ORAL AT BEDTIME
Refills: 0 | Status: DISCONTINUED | OUTPATIENT
Start: 2021-12-12 | End: 2021-12-13

## 2021-12-12 RX ORDER — DEXTROSE 50 % IN WATER 50 %
12.5 SYRINGE (ML) INTRAVENOUS ONCE
Refills: 0 | Status: DISCONTINUED | OUTPATIENT
Start: 2021-12-12 | End: 2021-12-13

## 2021-12-12 RX ORDER — PREGABALIN 225 MG/1
1000 CAPSULE ORAL DAILY
Refills: 0 | Status: DISCONTINUED | OUTPATIENT
Start: 2021-12-12 | End: 2021-12-13

## 2021-12-12 RX ORDER — ASPIRIN/CALCIUM CARB/MAGNESIUM 324 MG
325 TABLET ORAL DAILY
Refills: 0 | Status: DISCONTINUED | OUTPATIENT
Start: 2021-12-12 | End: 2021-12-13

## 2021-12-12 RX ADMIN — Medication 325 MILLIGRAM(S): at 12:28

## 2021-12-12 RX ADMIN — FAMOTIDINE 20 MILLIGRAM(S): 10 INJECTION INTRAVENOUS at 01:40

## 2021-12-12 RX ADMIN — OXYCODONE HYDROCHLORIDE 5 MILLIGRAM(S): 5 TABLET ORAL at 15:49

## 2021-12-12 RX ADMIN — LOSARTAN POTASSIUM 100 MILLIGRAM(S): 100 TABLET, FILM COATED ORAL at 06:57

## 2021-12-12 RX ADMIN — GABAPENTIN 300 MILLIGRAM(S): 400 CAPSULE ORAL at 22:23

## 2021-12-12 RX ADMIN — ATORVASTATIN CALCIUM 40 MILLIGRAM(S): 80 TABLET, FILM COATED ORAL at 22:23

## 2021-12-12 RX ADMIN — PREGABALIN 1000 MICROGRAM(S): 225 CAPSULE ORAL at 12:28

## 2021-12-12 RX ADMIN — Medication 1 TABLET(S): at 12:27

## 2021-12-12 RX ADMIN — Medication 300 MILLIGRAM(S): at 12:28

## 2021-12-12 RX ADMIN — BUDESONIDE AND FORMOTEROL FUMARATE DIHYDRATE 2 PUFF(S): 160; 4.5 AEROSOL RESPIRATORY (INHALATION) at 09:43

## 2021-12-12 NOTE — PROGRESS NOTE ADULT - ASSESSMENT
82 y/o female with PMH of HTN, DM-2, GERD came to the ED s/p fall. Patient said she tripped over carpet while going to answer her phone. She was unable to get up and also unable to ambulate on her left leg not due to pain but weakness. Baseline, she lives alone, ambulate without assistance. She reported pain in RLE. XR done: acute finding; CT lumbar also showed no acute finding. CT head: no acute intracranial finding.  Patient initially placed in observation, MRI ordered but due to persistent LLE weakness, medicine called for admission to rule out CVA.    Please verify patient's medication with her pharmacy in AM, med rec done base on prior med on rec, as per patient, still on the same medication, but her last visit was 2 years ago.     LLE weakness rule out CVA - i dont believe its a cva   neruro consult appreciated  MRI pending    HTN  c.w meds    DM-2   Hold Metformin   Insulin sliding scale     pain - oxycodoen prn

## 2021-12-12 NOTE — ED ADULT NURSE REASSESSMENT NOTE - NIH STROKE SCALE: 6A. MOTOR LEG, LEFT, QM
(0) No drift; leg holds 30 degree position for full 5 secs
(2) Some effort against gravity; leg falls to bed by 5 secs, but has some effort against gravity
(2) Some effort against gravity; leg falls to bed by 5 secs, but has some effort against gravity

## 2021-12-12 NOTE — CONSULT NOTE ADULT - SUBJECTIVE AND OBJECTIVE BOX
Neurology Consult Note  [facility] [place] [address] [place] [address]  [phone]    HPI:  80 yo woman with medical conditions as outlined below who was admitted for LLE weakness. She states she was rushing to get the phone and tripped over the carpet and landed "body forward" on her stomach. She denies LOC. She denies feeling lightheaded prior to this. She states after that, her RLE was hurting, but now she feels her LLE "does not work". She denies change in vision, change in speech or focal numbness. She states she now feels pain in her left ankle. She has no further complaints.    PMHx:  anemia  asthma  DM  Fibromyalgia  GERD  Glaucoma  HTN  Neuropathy  Osteoarthritis hip  Vertigo    PSHx:  hx of total knee replacement bilateral 2017  hx of bilateral cataract extraction  hx of carpal tunnel surgery of left wrist  hx of hysterectomy  hx of left knee replacement  s/p tonsillectomy and adenoidectomy    Meds:  MEDICATIONS  (STANDING):  aspirin 325 milliGRAM(s) Oral daily  atorvastatin 40 milliGRAM(s) Oral at bedtime  budesonide  80 MICROgram(s)/formoterol 4.5 MICROgram(s) Inhaler 2 Puff(s) Inhalation two times a day  cyanocobalamin 1000 MICROGram(s) Oral daily  dextrose 40% Gel 15 Gram(s) Oral once  dextrose 5%. 1000 milliLiter(s) (50 mL/Hr) IV Continuous <Continuous>  dextrose 5%. 1000 milliLiter(s) (100 mL/Hr) IV Continuous <Continuous>  dextrose 50% Injectable 25 Gram(s) IV Push once  dextrose 50% Injectable 12.5 Gram(s) IV Push once  dextrose 50% Injectable 25 Gram(s) IV Push once  enoxaparin Injectable 40 milliGRAM(s) SubCutaneous daily  gabapentin 300 milliGRAM(s) Oral at bedtime  glucagon  Injectable 1 milliGRAM(s) IntraMuscular once  insulin lispro (ADMELOG) corrective regimen sliding scale   SubCutaneous three times a day before meals  insulin lispro (ADMELOG) corrective regimen sliding scale   SubCutaneous at bedtime  losartan 100 milliGRAM(s) Oral daily  multivitamin/minerals 1 Tablet(s) Oral daily  theophylline ER Capsule 300 milliGRAM(s) Oral daily    MEDICATIONS  (PRN):  ALBUTerol    90 MICROgram(s) HFA Inhaler 2 Puff(s) Inhalation every 6 hours PRN Bronchospasm    Allergies:  morphine    FamHx:  Father - dementia, HTN, prostate cancer  Mother - bone cancer, AIDs  Child - cerebral palsy    SocHx:  former smoker, denies alcohol or drug use    ROS: A 12 system review of system was negative aside from pertinent negatives and positives outlined in HPI    Physical Exam  Vital Signs Last 24 Hrs  T(C): 36.8 (12 Dec 2021 06:39), Max: 37 (11 Dec 2021 19:41)  T(F): 98.2 (12 Dec 2021 06:39), Max: 98.6 (11 Dec 2021 19:41)  HR: 64 (12 Dec 2021 06:39) (60 - 71)  BP: 178/77 (12 Dec 2021 06:39) (147/72 - 178/81)  BP(mean): --  RR: 16 (12 Dec 2021 06:39) (16 - 18)  SpO2: 98% (12 Dec 2021 06:39) (95% - 98%)  General: no acute distress  HEENT:NC/AT  Lungs: no respiratory distress  Skin: no rash or ecchymoses  Lower extremities: no edema    Mental Status: Awake, alert, oriented to person and place, states month is December and year is 2020, no dysarthria, no aphasia  CN: PERRL, EOMI, VFF, V1-V3 sensation intact, no facial asymmetry  Motor:  5/5 bilateral upper extremities  5/5 RLE  4+/5 LLE  Reflexes: arreflexic throughout  Sensory: intact to light touch throughout  Coordination: no dysmetria on FTN  Gait: deferred      LABS:  cret                        13.2   7.93  )-----------( 301      ( 11 Dec 2021 22:55 )             39.4     12-11    142  |  105  |  12.2  ----------------------------<  121<H>  3.8   |  24.0  |  0.56    Ca    8.9      11 Dec 2021 22:55    TPro  7.2  /  Alb  4.0  /  TBili  0.3<L>  /  DBili  x   /  AST  19  /  ALT  18  /  AlkPhos  86  12-11    CT head w/o contrast - unremarkable

## 2021-12-12 NOTE — CONSULT NOTE ADULT - ASSESSMENT
80 yo woman with LLE subtle weakness s/p fall as well as ankle pain    LLE weakness  MRI brain, C, T, L spine w/o contrast pending  Recommend X ray left ankle due to report of left ankle pain after a fall  PT eval    Plan discussed with Dr. Wynne     Will continue to follow

## 2021-12-12 NOTE — H&P ADULT - HISTORY OF PRESENT ILLNESS
80 y/o female with PMH of HTN, DM-2, GERD came to the ED s/p fall. Patient said she tripped over carpet while going to answer her phone. She was unable to get up and also unable to ambulate on her left leg not due to pain but weakness. Baseline, she lives alone, ambulate without assistance. She reported pain in RLE, otherwise no HA, blurry vision, dizziness, chest pain, shortness of breath, palpitation, fever, chill, nausea, vomiting, abdominal pain, change in bowel/urinary habit.

## 2021-12-12 NOTE — ED ADULT NURSE REASSESSMENT NOTE - GLASGOW COMA SCALE: BEST MOTOR RESPONSE
(M6) obeys commands
(4) walks frequently
(M6) obeys commands

## 2021-12-12 NOTE — H&P ADULT - NSHPPHYSICALEXAM_GEN_ALL_CORE
Vital Signs Last 24 Hrs  T(C): 36.8 (12 Dec 2021 00:15), Max: 37 (11 Dec 2021 10:17)  T(F): 98.2 (12 Dec 2021 00:15), Max: 98.6 (11 Dec 2021 10:17)  HR: 60 (12 Dec 2021 00:15) (60 - 71)  BP: 147/72 (12 Dec 2021 00:15) (147/72 - 178/81)  BP(mean): --  RR: 16 (12 Dec 2021 00:15) (16 - 20)  SpO2: 95% (12 Dec 2021 00:15) (95% - 96%)

## 2021-12-12 NOTE — ED ADULT NURSE REASSESSMENT NOTE - GENERAL PATIENT STATE
comfortable appearance/cooperative/improvement verbalized
comfortable appearance/cooperative/smiling/interactive
comfortable appearance/cooperative
comfortable appearance
comfortable appearance
comfortable appearance/cooperative

## 2021-12-12 NOTE — H&P ADULT - NSHPSOCIALHISTORY_GEN_ALL_CORE
Former cigarette smoked for 40 years stopped in 2005, no alcohol or illicit drug use. , lives with family

## 2021-12-12 NOTE — ED ADULT NURSE REASSESSMENT NOTE - COMFORT CARE
plan of care explained/wait time explained
plan of care explained/side rails up
primafit placed
plan of care explained/repositioned/side rails up

## 2021-12-12 NOTE — PHYSICAL THERAPY INITIAL EVALUATION ADULT - ADDITIONAL COMMENTS
Pt reports independent PTA, uses [facility] for community short distances, [facility] for long distances. No other DME.  Pt's spouse is unable to provide assist and daughter lives in [place]. Pt hesitant to go home today secondary to weakness.

## 2021-12-12 NOTE — PHYSICAL THERAPY INITIAL EVALUATION ADULT - LEVEL OF INDEPENDENCE: STAIR NEGOTIATION, REHAB EVAL
unsafe secondary to pt c/o lightheadedness/unable to perform Low Acute Suicide Risk older male, glioblastoma, declining cognition, delirium, impulsive, but well supported by family, no history of mental health issues, no access to weapons.

## 2021-12-12 NOTE — ED ADULT NURSE REASSESSMENT NOTE - NS ED NURSE REASSESS COMMENT FT1
Assumed care of patient from previous RN. Patient at MRI. Will evaluate upon return.
patient c/o "indigestion". asking for maalox. PRUDENCIO Hobbs aware. trop added on. EKG ordered. to be admitted.
patient made aware she is admitted. swabbed for covid. attempted PIV. patient will need US guided PIV. admitted to step down stroke unit. will give IV meds when access.
Pt received @ 1800, A&OX3, c/o mild pain to lower back and heaviness to LLE.  Sensation intact to BLE and movement to BLE.  VSS, slightly hypertensive, BP meds given as ordered.  Clear bsb, abd soft nondistended, nontender, moving all ext well.
repeat neuro exam performed. patient
Pt received from obs, admitted to hospital to rule out stroke. Pending MRI. Pt alert and oriented x4. s/p fall c/o of LLE weakness. Hospitalist seen pt. Pepcid given for upset stomach. Placed on tele monitor, tolerating room air.
care assumed from WILLEM Noonan. neuro check performed per PA order. dysphagia screen performed by day shift, patient passed, swallowed pills whole with no problem. patient in NAD. LLE weaker. s/p mechanical fall yesterday. AAO x 4. all questions answered. will ctm

## 2021-12-12 NOTE — PROGRESS NOTE ADULT - SUBJECTIVE AND OBJECTIVE BOX
NAILA HODGE    [id number]    81y      Female    INTERVAL HPI/OVERNIGHT EVENTS: patient being seen for weakness. patient seen at bedside and deneis any weakness, other than left ankle pain       REVIEW OF SYSTEMS:    CONSTITUTIONAL: pain  RESPIRATORY: No cough, wheezing, hemoptysis; No shortness of breath  CARDIOVASCULAR: No chest pain, palpitations  GASTROINTESTINAL: No abdominal or epigastric pain. No nausea, vomiting  NEUROLOGICAL: No headaches, memory loss, loss of strength.  MISCELLANEOUS:      Vital Signs Last 24 Hrs  T(C): 36.8 (12 Dec 2021 06:39), Max: 37 (11 Dec 2021 19:41)  T(F): 98.2 (12 Dec 2021 06:39), Max: 98.6 (11 Dec 2021 19:41)  HR: 64 (12 Dec 2021 06:39) (60 - 71)  BP: 178/77 (12 Dec 2021 06:39) (147/72 - 178/81)  BP(mean): --  RR: 16 (12 Dec 2021 06:39) (16 - 18)  SpO2: 98% (12 Dec 2021 06:39) (95% - 98%)    PHYSICAL EXAM:    GENERAL: NAD, pleasant   HEENT: PERRL, +EOMI  NECK: soft, Supple, No JVD,   CHEST/LUNG: Clear to auscultation bilaterally; No wheezing  HEART: S1S2+, Regular rate and rhythm; No murmurs, rubs, or gallops  ABDOMEN: Soft, Nontender, Nondistended; Bowel sounds present  EXTREMITIES:  2+ Peripheral Pulses, No edema  SKIN: No rashes or lesions  NEURO: AAOX3, no focal deficits,       LABS:                        13.2   7.93  )-----------( 301      ( 11 Dec 2021 22:55 )             39.4     12    142  |  105  |  12.2  ----------------------------<  121<H>  3.8   |  24.0  |  0.56    Ca    8.9      11 Dec 2021 22:55    TPro  7.2  /  Alb  4.0  /  TBili  0.3<L>  /  DBili  x   /  AST  19  /  ALT  18  /  AlkPhos  86  12-11      Urinalysis Basic - ( 12 Dec 2021 05:46 )    Color: Yellow / Appearance: Clear / S.015 / pH: x  Gluc: x / Ketone: Negative  / Bili: Negative / Urobili: Negative mg/dL   Blood: x / Protein: 30 mg/dL / Nitrite: Negative   Leuk Esterase: Negative / RBC: Negative /HPF / WBC Negative   Sq Epi: x / Non Sq Epi: Occasional / Bacteria: Occasional          MEDICATIONS  (STANDING):  aspirin 325 milliGRAM(s) Oral daily  atorvastatin 40 milliGRAM(s) Oral at bedtime  budesonide  80 MICROgram(s)/formoterol 4.5 MICROgram(s) Inhaler 2 Puff(s) Inhalation two times a day  cyanocobalamin 1000 MICROGram(s) Oral daily  dextrose 40% Gel 15 Gram(s) Oral once  dextrose 5%. 1000 milliLiter(s) (50 mL/Hr) IV Continuous <Continuous>  dextrose 5%. 1000 milliLiter(s) (100 mL/Hr) IV Continuous <Continuous>  dextrose 50% Injectable 25 Gram(s) IV Push once  dextrose 50% Injectable 12.5 Gram(s) IV Push once  dextrose 50% Injectable 25 Gram(s) IV Push once  enoxaparin Injectable 40 milliGRAM(s) SubCutaneous daily  gabapentin 300 milliGRAM(s) Oral at bedtime  glucagon  Injectable 1 milliGRAM(s) IntraMuscular once  insulin lispro (ADMELOG) corrective regimen sliding scale   SubCutaneous three times a day before meals  insulin lispro (ADMELOG) corrective regimen sliding scale   SubCutaneous at bedtime  losartan 100 milliGRAM(s) Oral daily  multivitamin/minerals 1 Tablet(s) Oral daily  theophylline ER Capsule 300 milliGRAM(s) Oral daily    MEDICATIONS  (PRN):  ALBUTerol    90 MICROgram(s) HFA Inhaler 2 Puff(s) Inhalation every 6 hours PRN Bronchospasm      RADIOLOGY & ADDITIONAL TESTS:    left ankle xray ordered

## 2021-12-12 NOTE — H&P ADULT - ASSESSMENT
80 y/o female with PMH of HTN, DM-2, GERD came to the ED s/p fall. Patient said she tripped over carpet while going to answer her phone. She was unable to get up and also unable to ambulate on her left leg not due to pain but weakness. Baseline, she lives alone, ambulate without assistance. She reported pain in RLE. XR done: acute finding; CT lumbar also showed no acute finding. CT head: no acute intracranial finding.  Patient initially placed in observation, MRI ordered but due to persistent LLE weakness, medicine called for admission to rule out CVA.    Please verify patient's medication with her pharmacy in AM, med rec done base on prior med on rec, as per patient, still on the same medication, but her last visit was 2 years ago.     LLE weakness rule out CVA   Admit to telemetry   CT head as noted above   MRI ordered   Neuro check q4h   Since ruling out CVA, will get echo, HbA1C and lipid panel for completion   PT eval   Fall precaution   Neurology consulted     HTN  Will continue Losartan 100mg, symptom has been going on for > 24 hours   Monitor BP     DM-2   Hold Metformin   Insulin sliding scale     Supportive   DVT prophylaxis: Lovenox daily   Diet: DASH     Plan of care discussed with patient and ER nurse      82 y/o female with PMH of HTN, DM-2, GERD came to the ED s/p fall. Patient said she tripped over carpet while going to answer her phone. She was unable to get up and also unable to ambulate on her left leg not due to pain but weakness. Baseline, she lives alone, ambulate without assistance. She reported pain in RLE. XR done: acute finding; CT lumbar also showed no acute finding. CT head: no acute intracranial finding.  Patient initially placed in observation, MRI ordered but due to persistent LLE weakness, medicine called for admission to rule out CVA.    Please verify patient's medication with her pharmacy in AM, med rec done base on prior med on rec, as per patient, still on the same medication, but her last visit was 2 years ago.     LLE weakness rule out CVA   Admit to telemetry   CT head as noted above   MRI ordered   Neuro check q4h   Since ruling out CVA, will get echo, HbA1C and lipid panel for completion   PT eval   Fall precaution   Neurology consulted   Follow up MRI, if any acute finding, will start Aspirin and statin     HTN  Will continue Losartan 100mg, symptom has been going on for > 24 hours   Monitor BP     DM-2   Hold Metformin   Insulin sliding scale     Supportive   DVT prophylaxis: Lovenox daily   Diet: DASH     Plan of care discussed with patient and ER nurse

## 2021-12-13 ENCOUNTER — TRANSCRIPTION ENCOUNTER (OUTPATIENT)
Age: 81
End: 2021-12-13

## 2021-12-13 VITALS — SYSTOLIC BLOOD PRESSURE: 148 MMHG | DIASTOLIC BLOOD PRESSURE: 83 MMHG | HEART RATE: 81 BPM

## 2021-12-13 LAB
24R-OH-CALCIDIOL SERPL-MCNC: 40.8 NG/ML — SIGNIFICANT CHANGE UP (ref 30–80)
ALBUMIN SERPL ELPH-MCNC: 4.3 G/DL — SIGNIFICANT CHANGE UP (ref 3.3–5.2)
ALP SERPL-CCNC: 102 U/L — SIGNIFICANT CHANGE UP (ref 40–120)
ALT FLD-CCNC: 22 U/L — SIGNIFICANT CHANGE UP
ANION GAP SERPL CALC-SCNC: 15 MMOL/L — SIGNIFICANT CHANGE UP (ref 5–17)
AST SERPL-CCNC: 20 U/L — SIGNIFICANT CHANGE UP
BASOPHILS # BLD AUTO: 0.03 K/UL — SIGNIFICANT CHANGE UP (ref 0–0.2)
BASOPHILS NFR BLD AUTO: 0.5 % — SIGNIFICANT CHANGE UP (ref 0–2)
BILIRUB SERPL-MCNC: 0.5 MG/DL — SIGNIFICANT CHANGE UP (ref 0.4–2)
BUN SERPL-MCNC: 10.2 MG/DL — SIGNIFICANT CHANGE UP (ref 8–20)
CALCIUM SERPL-MCNC: 9.3 MG/DL — SIGNIFICANT CHANGE UP (ref 8.6–10.2)
CHLORIDE SERPL-SCNC: 103 MMOL/L — SIGNIFICANT CHANGE UP (ref 98–107)
CO2 SERPL-SCNC: 24 MMOL/L — SIGNIFICANT CHANGE UP (ref 22–29)
CREAT SERPL-MCNC: 0.42 MG/DL — LOW (ref 0.5–1.3)
CULTURE RESULTS: SIGNIFICANT CHANGE UP
EOSINOPHIL # BLD AUTO: 0.15 K/UL — SIGNIFICANT CHANGE UP (ref 0–0.5)
EOSINOPHIL NFR BLD AUTO: 2.3 % — SIGNIFICANT CHANGE UP (ref 0–6)
GLUCOSE BLDC GLUCOMTR-MCNC: 122 MG/DL — HIGH (ref 70–99)
GLUCOSE BLDC GLUCOMTR-MCNC: 134 MG/DL — HIGH (ref 70–99)
GLUCOSE SERPL-MCNC: 129 MG/DL — HIGH (ref 70–99)
HCT VFR BLD CALC: 42.9 % — SIGNIFICANT CHANGE UP (ref 34.5–45)
HGB BLD-MCNC: 14.6 G/DL — SIGNIFICANT CHANGE UP (ref 11.5–15.5)
IMM GRANULOCYTES NFR BLD AUTO: 0.5 % — SIGNIFICANT CHANGE UP (ref 0–1.5)
LYMPHOCYTES # BLD AUTO: 1.75 K/UL — SIGNIFICANT CHANGE UP (ref 1–3.3)
LYMPHOCYTES # BLD AUTO: 27.1 % — SIGNIFICANT CHANGE UP (ref 13–44)
MAGNESIUM SERPL-MCNC: 2.1 MG/DL — SIGNIFICANT CHANGE UP (ref 1.6–2.6)
MCHC RBC-ENTMCNC: 32 PG — SIGNIFICANT CHANGE UP (ref 27–34)
MCHC RBC-ENTMCNC: 34 GM/DL — SIGNIFICANT CHANGE UP (ref 32–36)
MCV RBC AUTO: 94.1 FL — SIGNIFICANT CHANGE UP (ref 80–100)
MONOCYTES # BLD AUTO: 0.66 K/UL — SIGNIFICANT CHANGE UP (ref 0–0.9)
MONOCYTES NFR BLD AUTO: 10.2 % — SIGNIFICANT CHANGE UP (ref 2–14)
NEUTROPHILS # BLD AUTO: 3.83 K/UL — SIGNIFICANT CHANGE UP (ref 1.8–7.4)
NEUTROPHILS NFR BLD AUTO: 59.4 % — SIGNIFICANT CHANGE UP (ref 43–77)
PLATELET # BLD AUTO: 292 K/UL — SIGNIFICANT CHANGE UP (ref 150–400)
POTASSIUM SERPL-MCNC: 3.7 MMOL/L — SIGNIFICANT CHANGE UP (ref 3.5–5.3)
POTASSIUM SERPL-SCNC: 3.7 MMOL/L — SIGNIFICANT CHANGE UP (ref 3.5–5.3)
PROT SERPL-MCNC: 7.8 G/DL — SIGNIFICANT CHANGE UP (ref 6.6–8.7)
RBC # BLD: 4.56 M/UL — SIGNIFICANT CHANGE UP (ref 3.8–5.2)
RBC # FLD: 16.6 % — HIGH (ref 10.3–14.5)
SODIUM SERPL-SCNC: 142 MMOL/L — SIGNIFICANT CHANGE UP (ref 135–145)
SPECIMEN SOURCE: SIGNIFICANT CHANGE UP
TSH SERPL-MCNC: 1.6 UIU/ML — SIGNIFICANT CHANGE UP (ref 0.27–4.2)
VIT B12 SERPL-MCNC: >2000 PG/ML — HIGH (ref 232–1245)
WBC # BLD: 6.45 K/UL — SIGNIFICANT CHANGE UP (ref 3.8–10.5)
WBC # FLD AUTO: 6.45 K/UL — SIGNIFICANT CHANGE UP (ref 3.8–10.5)

## 2021-12-13 PROCEDURE — 99239 HOSP IP/OBS DSCHRG MGMT >30: CPT

## 2021-12-13 RX ORDER — AMLODIPINE BESYLATE 2.5 MG/1
1 TABLET ORAL
Qty: 30 | Refills: 0
Start: 2021-12-13 | End: 2022-01-11

## 2021-12-13 RX ORDER — AMLODIPINE BESYLATE 2.5 MG/1
10 TABLET ORAL DAILY
Refills: 0 | Status: DISCONTINUED | OUTPATIENT
Start: 2021-12-13 | End: 2021-12-13

## 2021-12-13 RX ORDER — METFORMIN HYDROCHLORIDE 850 MG/1
1 TABLET ORAL
Qty: 0 | Refills: 0 | DISCHARGE

## 2021-12-13 RX ORDER — ASPIRIN/CALCIUM CARB/MAGNESIUM 324 MG
1 TABLET ORAL
Qty: 30 | Refills: 0
Start: 2021-12-13 | End: 2022-01-11

## 2021-12-13 RX ADMIN — Medication 300 MILLIGRAM(S): at 11:50

## 2021-12-13 RX ADMIN — AMLODIPINE BESYLATE 10 MILLIGRAM(S): 2.5 TABLET ORAL at 13:40

## 2021-12-13 RX ADMIN — Medication 325 MILLIGRAM(S): at 11:50

## 2021-12-13 RX ADMIN — BUDESONIDE AND FORMOTEROL FUMARATE DIHYDRATE 2 PUFF(S): 160; 4.5 AEROSOL RESPIRATORY (INHALATION) at 10:21

## 2021-12-13 RX ADMIN — Medication 1 TABLET(S): at 11:50

## 2021-12-13 RX ADMIN — PREGABALIN 1000 MICROGRAM(S): 225 CAPSULE ORAL at 11:50

## 2021-12-13 RX ADMIN — ENOXAPARIN SODIUM 40 MILLIGRAM(S): 100 INJECTION SUBCUTANEOUS at 11:50

## 2021-12-13 RX ADMIN — LOSARTAN POTASSIUM 100 MILLIGRAM(S): 100 TABLET, FILM COATED ORAL at 05:51

## 2021-12-13 NOTE — DISCHARGE NOTE NURSING/CASE MANAGEMENT/SOCIAL WORK - NSDCPEFALRISK_GEN_ALL_CORE
For information on Fall & Injury Prevention, visit: [email or web address] OR  [email or web address] OR  https://www.cdc.gov/steadi/patient.html

## 2021-12-13 NOTE — DISCHARGE NOTE PROVIDER - HOSPITAL COURSE
82 y/o female with PMH of HTN, DM-2, GERD came to the ED s/p fall. Patient said she tripped over carpet while going to answer her phone. She was unable to get up and also unable to ambulate on her left leg not due to pain but weakness. Baseline, she lives alone, ambulate without assistance. She reported pain in RLE, otherwise no HA, blurry vision, dizziness, chest pain, shortness of breath, palpitation, fever, chill, nausea, vomiting, abdominal pain, change in bowel/urinary habit.     patient admitted to medicine and had a mri head and spine which RULED OUT stroke or fractures  pt cleared patient and is now ready for dc home    a1c 6.0 - metfromin discontinued    pe  GENERAL: NAD, pleasant   HEENT: PERRL, +EOMI  NECK: soft, Supple, No JVD,   CHEST/LUNG: Clear to auscultation bilaterally; No wheezing  HEART: S1S2+, Regular rate and rhythm; No murmurs, rubs, or gallops  ABDOMEN: Soft, Nontender, Nondistended; Bowel sounds present  EXTREMITIES:  2+ Peripheral Pulses, No edema  SKIN: No rashes or lesions  NEURO: AAOX3, no focal deficits,     80 y/o female with PMH of HTN, DM-2, GERD came to the ED s/p fall. Patient said she tripped over carpet while going to answer her phone. She was unable to get up and also unable to ambulate on her left leg not due to pain but weakness. Baseline, she lives alone, ambulate without assistance. She reported pain in RLE, otherwise no HA, blurry vision, dizziness, chest pain, shortness of breath, palpitation, fever, chill, nausea, vomiting, abdominal pain, change in bowel/urinary habit.     patient admitted to medicine and had a mri head and spine which RULED OUT stroke or fractures of the spine    pt cleared patient and is now ready for dc home    DID NOT COMPLAIN OF FOOT PAIN ANY LONGER    a1c 6.0 - metfromin discontinued    pe  GENERAL: NAD, pleasant   HEENT: PERRL, +EOMI  NECK: soft, Supple, No JVD,   CHEST/LUNG: Clear to auscultation bilaterally; No wheezing  HEART: S1S2+, Regular rate and rhythm; No murmurs, rubs, or gallops  ABDOMEN: Soft, Nontender, Nondistended; Bowel sounds present  EXTREMITIES:  2+ Peripheral Pulses, No edema  SKIN: No rashes or lesions  NEURO: AAOX3, no focal deficits,

## 2021-12-13 NOTE — DISCHARGE NOTE NURSING/CASE MANAGEMENT/SOCIAL WORK - PATIENT PORTAL LINK FT
You can access the FollowMyHealth Patient Portal offered by [facility] by registering at the following website: [email or web address]. By joining [facility]’s FollowMyHealth portal, you will also be able to view your health information using other applications (apps) compatible with our system. Statement Selected

## 2021-12-13 NOTE — DISCHARGE NOTE PROVIDER - NSDCCPCAREPLAN_GEN_ALL_CORE_FT
PRINCIPAL DISCHARGE DIAGNOSIS  Diagnosis: Weakness  Assessment and Plan of Treatment:       SECONDARY DISCHARGE DIAGNOSES  Diagnosis: Chest discomfort  Assessment and Plan of Treatment:     Diagnosis: Spinal stenosis  Assessment and Plan of Treatment:

## 2021-12-13 NOTE — DISCHARGE NOTE PROVIDER - NSDCMRMEDTOKEN_GEN_ALL_CORE_FT
amLODIPine 10 mg oral tablet: 1 tab(s) orally once a day  Aspirin Enteric Coated 81 mg oral delayed release tablet: 1 tab(s) orally once a day   Calcium 600+D oral tablet: 1 tab(s) orally once a day  Centrum Silver oral tablet: 1 tab(s) orally once a day  fluticasone-salmeterol 100 mcg-50 mcg inhalation powder: 1 puff(s) inhaled 2 times a day  gabapentin 300 mg oral capsule: 1 cap(s) orally once a day (at bedtime)  losartan 100 mg oral tablet: 1 tab(s) orally once a day  meclizine 12.5 mg oral tablet: 1 tab(s) orally once a day, As Needed  oxyCODONE 5 mg oral tablet: 1-2  tab(s) orally every 4 to 6 hours MDD:8   Proventil HFA 90 mcg/inh inhalation aerosol: 2 puff(s) inhaled 4 times a day, As Needed - for shortness of breath and/or wheezing  Senna S 50 mg-8.6 mg oral tablet: 2 tab(s) orally once a day (at bedtime) MDD:2  theophylline 300 mg oral tablet, extended release: 1 tab(s) orally every 12 hours  Vitamin B-12 1000 mcg oral tablet: 1 tab(s) orally once a day

## 2021-12-16 LAB
GLUCOSE BLDC GLUCOMTR-MCNC: 107 MG/DL — HIGH (ref 70–99)
GLUCOSE BLDC GLUCOMTR-MCNC: 114 MG/DL — HIGH (ref 70–99)
GLUCOSE BLDC GLUCOMTR-MCNC: 115 MG/DL — HIGH (ref 70–99)

## 2021-12-29 PROCEDURE — 82607 VITAMIN B-12: CPT

## 2021-12-29 PROCEDURE — 72141 MRI NECK SPINE W/O DYE: CPT | Mod: MA

## 2021-12-29 PROCEDURE — 73560 X-RAY EXAM OF KNEE 1 OR 2: CPT

## 2021-12-29 PROCEDURE — 84484 ASSAY OF TROPONIN QUANT: CPT

## 2021-12-29 PROCEDURE — 87635 SARS-COV-2 COVID-19 AMP PRB: CPT

## 2021-12-29 PROCEDURE — 84443 ASSAY THYROID STIM HORMONE: CPT

## 2021-12-29 PROCEDURE — 80053 COMPREHEN METABOLIC PANEL: CPT

## 2021-12-29 PROCEDURE — 82962 GLUCOSE BLOOD TEST: CPT

## 2021-12-29 PROCEDURE — 81001 URINALYSIS AUTO W/SCOPE: CPT

## 2021-12-29 PROCEDURE — 85025 COMPLETE CBC W/AUTO DIFF WBC: CPT

## 2021-12-29 PROCEDURE — 80061 LIPID PANEL: CPT

## 2021-12-29 PROCEDURE — C8929: CPT

## 2021-12-29 PROCEDURE — 72148 MRI LUMBAR SPINE W/O DYE: CPT | Mod: MA

## 2021-12-29 PROCEDURE — 96374 THER/PROPH/DIAG INJ IV PUSH: CPT

## 2021-12-29 PROCEDURE — 87086 URINE CULTURE/COLONY COUNT: CPT

## 2021-12-29 PROCEDURE — 70450 CT HEAD/BRAIN W/O DYE: CPT | Mod: MA

## 2021-12-29 PROCEDURE — 83735 ASSAY OF MAGNESIUM: CPT

## 2021-12-29 PROCEDURE — 71045 X-RAY EXAM CHEST 1 VIEW: CPT

## 2021-12-29 PROCEDURE — 83036 HEMOGLOBIN GLYCOSYLATED A1C: CPT

## 2021-12-29 PROCEDURE — 36415 COLL VENOUS BLD VENIPUNCTURE: CPT

## 2021-12-29 PROCEDURE — 73590 X-RAY EXAM OF LOWER LEG: CPT

## 2021-12-29 PROCEDURE — 70551 MRI BRAIN STEM W/O DYE: CPT | Mod: MA

## 2021-12-29 PROCEDURE — 73521 X-RAY EXAM HIPS BI 2 VIEWS: CPT

## 2021-12-29 PROCEDURE — 73600 X-RAY EXAM OF ANKLE: CPT

## 2021-12-29 PROCEDURE — 73620 X-RAY EXAM OF FOOT: CPT

## 2021-12-29 PROCEDURE — 72146 MRI CHEST SPINE W/O DYE: CPT | Mod: MA

## 2021-12-29 PROCEDURE — 72131 CT LUMBAR SPINE W/O DYE: CPT | Mod: MA

## 2021-12-29 PROCEDURE — 94640 AIRWAY INHALATION TREATMENT: CPT

## 2021-12-29 PROCEDURE — 82306 VITAMIN D 25 HYDROXY: CPT

## 2021-12-29 PROCEDURE — 93005 ELECTROCARDIOGRAM TRACING: CPT

## 2021-12-29 PROCEDURE — 99285 EMERGENCY DEPT VISIT HI MDM: CPT

## 2022-01-05 NOTE — PATIENT PROFILE ADULT. - MENTAL HEALTH CONDITIONS/SYMPTOMS, PROFILE
[FreeTextEntry1] : 1/5/2022: The patient is a 40 year old female presenting for a follow-up evaluation of right sided posterior heel pain. Her pain scale is stated to be slightly improvement, with a waxing and waning timing. The patient has been attending physical therapy for this issue for the past month, with improvement noted. She has utilized NSAIDs and Voltaren gel for this issue. She denies any numbness or tingling sensations. She presents wearing flats. No other complaints. \par \par 11/4/2021: FIDELIA AVENDAÑO is a 40 year old female presenting for an initial evaluation of right sided posterior heel pain ongoing for the past 1 month.  The patient’s pain is noted to be a 5/10, with a sharp and throbbing character. Walking and flexion of the foot are noted to increase her pain scale. Denies any trauma or injury to the foot. She has been utilizing Advil PRN for this issue, with no relief noted. The patient confirms a medical history of Psoriatic Arthritis, PCOS, Acid Reflex, and Chronic Migraines. She denies any history of Pre-Diabetes or the usage of Metformin and the recent usage of steroids. She denies any numbness or tingling sensations to the foot. No other complaints. 
none

## 2022-01-18 NOTE — ED CDU PROVIDER INITIAL DAY NOTE - MEDICAL DECISION MAKING DETAILS
[FreeTextEntry1] : ATTENDING'S SUMMARY:\par 1-18-22:\par mild pallor, no icterus\par thyroid not enlarged, negative francesca's sign\par no JVD, no hepatojugular reflux, no HSM\par no cervical adenopathy, no supraclavicular adenopathy\par erythema noted in the posterior pharynx, saliva pooling at the back of the throat. Dryness of the nasal mucosa, no polyps, no discharge\par normal s1/s2, no murmurs, rubs or gallops\par few scattered inspiratory crackles at both bases\par no cyanosis, no clubbing, no articular manifestations, 2+ thickening on the dorsum of the hands\par no cogwheel rigidity noted, no tremors. No past pointing, no nystagmus\par 2+ clubbing\par Parkinsonian features noted. Lack of expression\par no pedal edema MRI head and spine - PT consult

## 2022-03-13 ENCOUNTER — RX RENEWAL (OUTPATIENT)
Age: 82
End: 2022-03-13

## 2022-03-24 ENCOUNTER — APPOINTMENT (OUTPATIENT)
Dept: ORTHOPEDIC SURGERY | Facility: CLINIC | Age: 82
End: 2022-03-24
Payer: MEDICARE

## 2022-03-24 VITALS
WEIGHT: 166 LBS | DIASTOLIC BLOOD PRESSURE: 87 MMHG | HEIGHT: 61 IN | HEART RATE: 78 BPM | BODY MASS INDEX: 31.34 KG/M2 | SYSTOLIC BLOOD PRESSURE: 158 MMHG

## 2022-03-24 DIAGNOSIS — Z96.641 PRESENCE OF RIGHT ARTIFICIAL HIP JOINT: ICD-10-CM

## 2022-03-24 PROCEDURE — 99213 OFFICE O/P EST LOW 20 MIN: CPT

## 2022-03-24 PROCEDURE — 73502 X-RAY EXAM HIP UNI 2-3 VIEWS: CPT

## 2022-03-24 NOTE — HISTORY OF PRESENT ILLNESS
[de-identified] : Ms. NAILA HODGE is a 81 year old female presenting for routine 3-yr follow up evaluation of the right hip, status post right THR on 1/23/19. She notes intermittent soreness in her thigh, particularly on rainy days but it is minor. She admits to a few recent falls but state that these " had nothing to do with the hip". Patient takes Tylenol as needed with good relief when she takes it. She denies any fevers chills or incisional issues. She is very happy with her hip replacement.

## 2022-03-24 NOTE — ADDENDUM
[FreeTextEntry1] : This note was authored by Tirso Dumont working as a medical scribe for Dr. Salo Sewell. The note was reviewed, edited, and revised by Dr. Salo Sewell whom is in agreement with the exam findings, imaging findings, and treatment plan. 03/24/2022

## 2022-03-24 NOTE — DISCUSSION/SUMMARY
[de-identified] : The patient is doing very well 3 years following right total hip replacement. The patient will continue their home exercises. Overall the patient is very happy with the outcome of the surgery and very thankful.  She will use asparcreme on the thigh as needed.  Dental prophylaxis was reviewed. Follow up as needed.

## 2022-03-24 NOTE — PHYSICAL EXAM
[de-identified] : The patient appears well nourished  and in no apparent distress.  The patient is alert and oriented to person, place, and time.   Affect and mood appear normal. The head is normocephalic and atraumatic.  The eyes reveal normal sclera and extra ocular muscles are intact. The tongue is midline with no apparent lesions.  Skin shows normal turgor with no evidence of eczema or psoriasis.  No respiratory distress noted.  Sensation grossly intact.		  [de-identified] : Exam of the right hip shows hip external rotation of 35 degrees, hip internal rotation of 25 degrees. Patient can perform a straight leg raise without pain. There is no pain with range of motion. 		  \par 5/5 motor strength bilaterally distally. Sensation intact distally.		  [de-identified] :  X-ray: AP of the pelvis and 2 views of the right hip demonstrate a right total hip arthroplasty in stable position, with no evidence of fracture, loosening, or dislocation.

## 2022-05-17 ENCOUNTER — NON-APPOINTMENT (OUTPATIENT)
Age: 82
End: 2022-05-17

## 2022-05-17 ENCOUNTER — APPOINTMENT (OUTPATIENT)
Dept: NEUROLOGY | Facility: CLINIC | Age: 82
End: 2022-05-17
Payer: MEDICARE

## 2022-05-17 VITALS
HEIGHT: 61 IN | BODY MASS INDEX: 31.15 KG/M2 | SYSTOLIC BLOOD PRESSURE: 130 MMHG | WEIGHT: 165 LBS | DIASTOLIC BLOOD PRESSURE: 80 MMHG

## 2022-05-17 PROCEDURE — 99214 OFFICE O/P EST MOD 30 MIN: CPT

## 2022-05-17 NOTE — ASSESSMENT
[FreeTextEntry1] : History quite suggestive of trigeminal neuralgia. Doing well on Trileptal 300 mg 4 times a day. Follow up in 6 months and by phone prior to that if needed.

## 2022-05-17 NOTE — DATA REVIEWED
[de-identified] : brain MRI done 1/5/21 reports some small vessel ischemic disease and an incidental small calcified meningioma.

## 2022-05-17 NOTE — HISTORY OF PRESENT ILLNESS
[FreeTextEntry1] : I saw her initially 12/21/20 with the following history. She reports right-sided facial pain on and off for the last 2 months.She describes a throbbing and shocklike pain. It involves the entire right side of the face. It is exacerbated by talking, chewing, and brushing her teeth. Typical A. severe. She is currently on Lyrica 100 mg twice a day without any significant improvement. She has tried other medications but does not know what she has been on in the past. She uses Aspercreme which helps some.\par \par Medical history significant for diabetes and hypertension.\par \par I referred her for a brain MRI. This was done 1/5/21. It showed some small vessel ischemic disease along with a small incidental calcified meningioma. I started her on Trileptal 300 mg currently 4 times a day. Doing well on that.\par If She misses a dose she feels the pain start to come back.

## 2022-05-17 NOTE — PHYSICAL EXAM
[FreeTextEntry1] : No palpation tenderness over the face. [General Appearance - Alert] : alert [General Appearance - In No Acute Distress] : in no acute distress [General Appearance - Well-Appearing] : healthy appearing [Oriented To Time, Place, And Person] : oriented to person, place, and time [Impaired Insight] : insight and judgment were intact [Affect] : the affect was normal [Memory Recent] : recent memory was not impaired [Person] : oriented to person [Place] : oriented to place [Time] : oriented to time [Concentration Intact] : normal concentrating ability [Naming Objects] : no difficulty naming common objects [Fluency] : fluency intact [Comprehension] : comprehension intact [Cranial Nerves Optic (II)] : visual acuity intact bilaterally,  visual fields full to confrontation, pupils equal round and reactive to light [Cranial Nerves Oculomotor (III)] : extraocular motion intact [Cranial Nerves Trigeminal (V)] : facial sensation intact symmetrically [Cranial Nerves Facial (VII)] : face symmetrical [Cranial Nerves Vestibulocochlear (VIII)] : hearing was intact bilaterally [Cranial Nerves Glossopharyngeal (IX)] : tongue and palate midline [Cranial Nerves Accessory (XI - Cranial And Spinal)] : head turning and shoulder shrug symmetric [Cranial Nerves Hypoglossal (XII)] : there was no tongue deviation with protrusion [Motor Tone] : muscle tone was normal in all four extremities [Motor Strength] : muscle strength was normal in all four extremities [No Muscle Atrophy] : normal bulk in all four extremities [Paresis Pronator Drift Right-Sided] : no pronator drift on the right [Paresis Pronator Drift Left-Sided] : no pronator drift on the left [Sensation Tactile Decrease] : light touch was intact [Sensation Pain / Temperature Decrease] : pain and temperature was intact [Balance] : balance was intact [Past-pointing] : there was no past-pointing [Tremor] : no tremor present [2+] : Ankle jerk left 2+ [Plantar Reflex Right Only] : normal on the right [Plantar Reflex Left Only] : normal on the left [FreeTextEntry8] : Ambulates with a cane due to the hip and knee problems. [PERRL With Normal Accommodation] : pupils were equal in size, round, reactive to light, with normal accommodation [Extraocular Movements] : extraocular movements were intact [Full Visual Field] : full visual field [Edema] : there was no peripheral edema

## 2022-05-17 NOTE — CONSULT LETTER
[Dear  ___] : Dear  [unfilled], [Consult Letter:] : I had the pleasure of evaluating your patient, [unfilled]. [Please see my note below.] : Please see my note below. [Consult Closing:] : Thank you very much for allowing me to participate in the care of this patient.  If you have any questions, please do not hesitate to contact me. [Sincerely,] : Sincerely, [FreeTextEntry3] : Cooper Ayala MD, PhD, DPN-N\par [facility]\par Neurology at [place]\par Chief, Division of Neurology\par [facility]\par

## 2022-11-17 ENCOUNTER — APPOINTMENT (OUTPATIENT)
Dept: NEUROLOGY | Facility: CLINIC | Age: 82
End: 2022-11-17

## 2022-11-17 PROCEDURE — 99214 OFFICE O/P EST MOD 30 MIN: CPT

## 2022-11-17 NOTE — HISTORY OF PRESENT ILLNESS
[FreeTextEntry1] : I saw her initially 12/21/20 with the following history. She reports right-sided facial pain on and off for the last 2 months.She describes a throbbing and shocklike pain. It involves the entire right side of the face. It is exacerbated by talking, chewing, and brushing her teeth. Typical A. severe. She is currently on Lyrica 100 mg twice a day without any significant improvement. She has tried other medications but does not know what she has been on in the past. She uses Aspercreme which helps some.\par \par Medical history significant for diabetes and hypertension.\par \par I referred her for a brain MRI. This was done 1/5/21. It showed some small vessel ischemic disease along with a small incidental calcified meningioma. I started her on Trileptal 300 mg currently 4 times a day.  She had been doing well on that until about a month ago.  Pain has started to intensify.

## 2022-11-17 NOTE — ASSESSMENT
[FreeTextEntry1] : Trigeminal neuralgia\par Had been doing well on Trileptal 300 mg 4 times a day\par Pain started to intensify over the last month\par We will add gabapentin, initially 300 mg at bedtime for the first week and thereafter 300 mg twice a day\par Follow-up here in 6 weeks and by phone prior to that as needed

## 2022-11-17 NOTE — DATA REVIEWED
[de-identified] : brain MRI done 1/5/21 reports some small vessel ischemic disease and an incidental small calcified meningioma.

## 2022-11-17 NOTE — CONSULT LETTER
[Dear  ___] : Dear  [unfilled], [Consult Letter:] : I had the pleasure of evaluating your patient, [unfilled]. [Please see my note below.] : Please see my note below. [Consult Closing:] : Thank you very much for allowing me to participate in the care of this patient.  If you have any questions, please do not hesitate to contact me. [Sincerely,] : Sincerely, [FreeTextEntry3] : Cooper Ayala MD, PhD, DPN-N\par [facility]\par Neurology at [place]\par Chief, Division of Neurology\par [facility]\par

## 2023-01-06 ENCOUNTER — APPOINTMENT (OUTPATIENT)
Dept: NEUROLOGY | Facility: CLINIC | Age: 83
End: 2023-01-06
Payer: MEDICARE

## 2023-01-06 VITALS
SYSTOLIC BLOOD PRESSURE: 124 MMHG | DIASTOLIC BLOOD PRESSURE: 80 MMHG | WEIGHT: 160 LBS | BODY MASS INDEX: 30.21 KG/M2 | HEIGHT: 61 IN

## 2023-01-06 PROCEDURE — 99214 OFFICE O/P EST MOD 30 MIN: CPT

## 2023-01-06 NOTE — CONSULT LETTER
[Dear  ___] : Dear  [unfilled], [Consult Letter:] : I had the pleasure of evaluating your patient, [unfilled]. [Please see my note below.] : Please see my note below. [Consult Closing:] : Thank you very much for allowing me to participate in the care of this patient.  If you have any questions, please do not hesitate to contact me. [Sincerely,] : Sincerely, [FreeTextEntry3] : Cooper Ayaal MD, PhD, DPN-N\par [facility]\par Neurology at [place]\par Chief, Division of Neurology\par [facility]\par

## 2023-01-06 NOTE — DATA REVIEWED
[de-identified] : brain MRI done 1/5/21 reports some small vessel ischemic disease and an incidental small calcified meningioma.

## 2023-01-06 NOTE — HISTORY OF PRESENT ILLNESS
[FreeTextEntry1] : I saw her initially 12/21/20 with the following history. She reports right-sided facial pain on and off for the last 2 months.She describes a throbbing and shocklike pain. It involves the entire right side of the face. It is exacerbated by talking, chewing, and brushing her teeth. Typical A. severe. She is currently on Lyrica 100 mg twice a day without any significant improvement. She has tried other medications but does not know what she has been on in the past. She uses Aspercreme which helps some.\par \par Medical history significant for diabetes and hypertension.\par \par I referred her for a brain MRI. This was done 1/5/21. It showed some small vessel ischemic disease along with a small incidental calcified meningioma. I started her on Trileptal 300 mg currently 4 times a day.  She had been doing well on that until October 2022.  Pain started to intensify.  I added gabapentin, built up to 300 mg twice a day with good relief of pain

## 2023-01-06 NOTE — ASSESSMENT
[FreeTextEntry1] : Trigeminal neuralgia\par Now doing well on Trileptal 300 mg 4 times a day and gabapentin 300 mg twice a day\par Medications to remain the same for now\par Follow-up in 3 months and by phone prior to that as needed

## 2023-04-19 ENCOUNTER — APPOINTMENT (OUTPATIENT)
Dept: NEUROLOGY | Facility: CLINIC | Age: 83
End: 2023-04-19
Payer: MEDICARE

## 2023-04-19 VITALS
SYSTOLIC BLOOD PRESSURE: 130 MMHG | DIASTOLIC BLOOD PRESSURE: 80 MMHG | WEIGHT: 160 LBS | HEIGHT: 61 IN | BODY MASS INDEX: 30.21 KG/M2

## 2023-04-19 PROCEDURE — 99214 OFFICE O/P EST MOD 30 MIN: CPT

## 2023-04-19 NOTE — PHYSICAL EXAM
[FreeTextEntry1] : No palpation tenderness over the face. [General Appearance - Alert] : alert [General Appearance - In No Acute Distress] : in no acute distress [General Appearance - Well-Appearing] : healthy appearing [Oriented To Time, Place, And Person] : oriented to person, place, and time [Impaired Insight] : insight and judgment were intact [Affect] : the affect was normal [Memory Recent] : recent memory was not impaired [Person] : oriented to person [Place] : oriented to place [Time] : oriented to time [Concentration Intact] : normal concentrating ability [Naming Objects] : no difficulty naming common objects [Fluency] : fluency intact [Comprehension] : comprehension intact [Cranial Nerves Optic (II)] : visual acuity intact bilaterally,  visual fields full to confrontation, pupils equal round and reactive to light [Cranial Nerves Oculomotor (III)] : extraocular motion intact [Cranial Nerves Trigeminal (V)] : facial sensation intact symmetrically [Cranial Nerves Facial (VII)] : face symmetrical [Cranial Nerves Vestibulocochlear (VIII)] : hearing was intact bilaterally [Cranial Nerves Glossopharyngeal (IX)] : tongue and palate midline [Cranial Nerves Accessory (XI - Cranial And Spinal)] : head turning and shoulder shrug symmetric [Cranial Nerves Hypoglossal (XII)] : there was no tongue deviation with protrusion [Motor Tone] : muscle tone was normal in all four extremities [Motor Strength] : muscle strength was normal in all four extremities [No Muscle Atrophy] : normal bulk in all four extremities [Paresis Pronator Drift Right-Sided] : no pronator drift on the right [Paresis Pronator Drift Left-Sided] : no pronator drift on the left [Sensation Tactile Decrease] : light touch was intact [Sensation Pain / Temperature Decrease] : pain and temperature was intact [Balance] : balance was intact [Past-pointing] : there was no past-pointing [Tremor] : no tremor present [2+] : Ankle jerk left 2+ [Plantar Reflex Right Only] : normal on the right [Plantar Reflex Left Only] : normal on the left [FreeTextEntry8] : Ambulates with a cane due to the hip and knee problems. [PERRL With Normal Accommodation] : pupils were equal in size, round, reactive to light, with normal accommodation [Extraocular Movements] : extraocular movements were intact [Full Visual Field] : full visual field

## 2023-04-19 NOTE — CONSULT LETTER
[Dear  ___] : Dear  [unfilled], [Consult Letter:] : I had the pleasure of evaluating your patient, [unfilled]. [Please see my note below.] : Please see my note below. [Consult Closing:] : Thank you very much for allowing me to participate in the care of this patient.  If you have any questions, please do not hesitate to contact me. [Sincerely,] : Sincerely, [FreeTextEntry3] : Cooper Ayala MD, PhD, DPN-N\par [facility]\par Neurology at [place]\par Chief, Division of Neurology\par [facility]\par

## 2023-04-19 NOTE — ASSESSMENT
[FreeTextEntry1] : Trigeminal neuralgia\par Now doing well on Trileptal 300 mg 4 times a day and gabapentin 300 mg twice a day\par Medications to remain the same for now\par Follow-up in 6 months and by phone prior to that as needed

## 2023-04-19 NOTE — DATA REVIEWED
[de-identified] : brain MRI done 1/5/21 reports some small vessel ischemic disease and an incidental small calcified meningioma.

## 2023-05-10 ENCOUNTER — RX RENEWAL (OUTPATIENT)
Age: 83
End: 2023-05-10

## 2023-06-13 ENCOUNTER — RX RENEWAL (OUTPATIENT)
Age: 83
End: 2023-06-13

## 2023-06-19 NOTE — H&P PST ADULT - TOBACCO FREE, LONGEST PERIOD, PROFILE
[FreeTextEntry1] : 30 Y Autistic M, here today for episodes of regurgitation/vomiting after meals which has completely resolved since limiting large meals and greasy foods. Accompanied by his mother. \par \par Reports first episode in January - he had chicken and shrimp teriyaki with rice; after this meal he began throwing up. Second time one month later was from pizza and then third time from a [employer] meal. Fourth time was chicken soup. Reports nausea resolves after emesis.  Denies abd pain. Likely occurring after large meals.  Denies dysphagia. Denies dyspepsia. Weight loss of 15lbs in 2 mo - pt avoiding fried foods, limiting carbs, and walking more.  \par Pt denies diarrhea or constipation. Having 4 BMs per day. No blood. Denies straining. \par \par Undergoing PT for arthritis of back. \par \par Since last visit, underwent following tests: \par negative HP breath\par + TTG Igg (borderline)\par Normal esophagram \par Normal abd U/S \par \par Social hx: denies alcohol use, no drug use, no NSAID use, denies cannabis use \par Family hx: pt unclear on history  quit 2005

## 2023-07-03 ENCOUNTER — RX RENEWAL (OUTPATIENT)
Age: 83
End: 2023-07-03

## 2023-09-15 ASSESSMENT — KOOS JR
RISING FROM SITTING: MODERATE
IMPORTED KOOS JR SCORE: 79.91
KOOS JR RAW SCORE: 3
STANDING UPRIGHT: MODERATE
IMPORTED KOOS JR SCORE: 61.58
RISING FROM SITTING: MILD
STRAIGHTENING KNEE FULLY: SEVERE
IMPORTED LATERALITY: LEFT
IMPORTED KOOS JR SCORE: 76.33
STRAIGHTENING KNEE FULLY: MILD
HOW SEVERE IS YOUR KNEE STIFFNESS AFTER FIRST WAKING IN MORNING: MILD
BENDING TO THE FLOOR TO PICK UP OBJECT: MODERATE
BENDING TO THE FLOOR TO PICK UP OBJECT: SEVERE
IMPORTED KOOS JR SCORE: 44.91
IMPORTED FORM: YES
GOING UP OR DOWN STAIRS: MODERATE
TWISING OR PIVOTING ON KNEE: SEVERE
TWISING OR PIVOTING ON KNEE: MILD
KOOS JR RAW SCORE: 4
HOW SEVERE IS YOUR KNEE STIFFNESS AFTER FIRST WAKING IN MORNING: SEVERE
GOING UP OR DOWN STAIRS: MILD
KOOS JR RAW SCORE: 17
BENDING TO THE FLOOR TO PICK UP OBJECT: MILD
KOOS JR RAW SCORE: 10

## 2023-09-15 ASSESSMENT — HOOS JR
WALKING ON UNEVEN SURFACE: MODERATE
IMPORTED LATERALITY: RIGHT
RISING FROM SITTING: MODERATE
IMPORTED HOOS JR SCORE: 80.55
RISING FROM SITTING: MILD
HOOS JR RAW SCORE: 14
BENDING TO THE FLOOR TO PICK UP OBJECT: SEVERE
GOING UP OR DOWN STAIRS: MILD
LYING IN BED (TURNING OVER, MAINTAINING HIP POSITION): SEVERE
SITTING: MODERATE
HOOS JR RAW SCORE: 3
GOING UP OR DOWN STAIRS: MODERATE
LYING IN BED (TURNING OVER, MAINTAINING HIP POSITION): MILD
IMPORTED HOOS JR SCORE: 46.65
BENDING TO THE FLOOR TO PICK UP OBJECT: MILD
IMPORTED FORM: YES

## 2023-09-19 NOTE — CONSULT NOTE ADULT - SUBJECTIVE AND OBJECTIVE BOX
PMD : Pierce       Patient is a 77y old  Female who presents with a chief complaint of Left knee pain       HPI:  77F PMH HTN, Asthma, DM, Colon Polyps, Neuropathy, Fibromyalgia, GERD, Glaucoma and Anemia with left knee osteoarthritis , now s/p L TKA , POD # 1    PAST MEDICAL & SURGICAL HISTORY:  Neuropathy  Fibromyalgia  GERD (gastroesophageal reflux disease)  Glaucoma  Asthma  Colon polyps  Anemia  Hypertension  Osteoarthritis  Diabetes mellitus  History of bilateral cataract extraction  S/P tonsillectomy and adenoidectomy  History of hysterectomy      Social History:   Social History:  · Marital Status	  · Occupation	Retired Food Industry  · Lives With	spouse     Substance Use History:  · Substance Use	caffeine  · Caffeine Type	coffee  · Caffeine Amount/Frequency	1-2 cups/cans per day     Alcohol Use History:  · Have you ever consumed alcohol	never     Tobacco Usage:  · Tobacco Usage: Former smoker  · Tobacco Type: cigarettes  · Number of Packs per Day: 1  · Number of yrs: 46  · Pack yrs: 46  · Longest Period Tobacco-Free: Quit 2005    FAMILY HISTORY:  Family history of cerebral palsy (Child)  Family history of AIDS (Sibling)  Family history of dementia (Father)  Family history of prostate cancer (Father)  Family history of bone cancer (Mother)      Allergies    morphine (Unknown)    Intolerances        HOME MEDICATIONS :     · 	meclizine 12.5 mg oral tablet: Last Dose Taken:  , 1 tab(s) orally once a day, As Needed  · 	losartan 100 mg oral tablet: Last Dose Taken:  , 1 tab(s) orally once a day  · 	theophylline 300 mg oral tablet, extended release: Last Dose Taken:  , 1 tab(s) orally every 12 hours  · 	Advair Diskus 250 mcg-50 mcg inhalation powder: Last Dose Taken:  , 1 puff(s) inhaled 2 times a day, As Needed - for shortness of breath and/or wheezing  · 	Proventil HFA 90 mcg/inh inhalation aerosol: Last Dose Taken:  , 2 puff(s) inhaled 4 times a day, As Needed - for shortness of breath and/or wheezing  · 	fexofenadine 180 mg oral tablet: Last Dose Taken:  , 1 tab(s) orally once a day, As Needed - for allergy symptoms  · 	azelastine 137 mcg/inh (0.1%) nasal spray: Last Dose Taken:  , 2 spray(s) nasal 2 times a day, As Needed  · 	Centrum Silver oral tablet: Last Dose Taken:  , 1 tab(s) orally once a day  · 	Vitamin B-12 1000 mcg oral tablet: Last Dose Taken:  , 1 tab(s) orally once a day  · 	Calcium 600+D oral tablet: Last Dose Taken:  , 1 tab(s) orally once a day  · 	Citrucel 2 g/19 g oral powder for reconstitution: Last Dose Taken:  , 1 dose(s) orally once a day  · 	glucosamine: Last Dose Taken:  , 2000 milligram(s) orally once a day  · 	gabapentin 300 mg oral capsule: Last Dose Taken:  , 1 cap(s) orally once a day (at bedtime)  · 	metFORMIN 500 mg oral tablet: Last Dose Taken:  , 1 tab(s) orally once a day (at bedtime)  · 	Tylenol 500 mg oral tablet: 2 tab(s) orally every 6 hours, As Needed    REVIEW OF SYSTEMS:    CONSTITUTIONAL: No fever, weight loss, or fatigue  EYES: No eye pain, visual disturbances, or discharge  NECK: No pain or stiffness  RESPIRATORY: No cough, wheezing, chills or hemoptysis; No shortness of breath  CARDIOVASCULAR: No chest pain, palpitations, dizziness, or leg swelling  GASTROINTESTINAL: No abdominal or epigastric pain. No nausea, vomiting, or hematemesis; No diarrhea or constipation. No melena or hematochezia.  GENITOURINARY: No dysuria, frequency, hematuria, or incontinence  NEUROLOGICAL: No headaches, memory loss, loss of strength, numbness, or tremors  SKIN: No itching, burning, rashes, or lesions   LYMPH NODES: No enlarged glands  ENDOCRINE: No heat or cold intolerance; No hair loss  MUSCULOSKELETAL: L knee pain   PSYCHIATRIC: No depression, anxiety, mood swings, or difficulty sleeping  HEME/LYMPH: No easy bruising, or bleeding gums  ALLERGY AND IMMUNOLOGIC: No hives or eczema    MEDICATIONS  (STANDING):  acetaminophen   Tablet. 975 milliGRAM(s) Oral every 8 hours  buDESOnide 160 MICROgram(s)/formoterol 4.5 MICROgram(s) Inhaler 2 Puff(s) Inhalation two times a day  dextrose 5%. 1000 milliLiter(s) (50 mL/Hr) IV Continuous <Continuous>  dextrose 50% Injectable 12.5 Gram(s) IV Push once  dextrose 50% Injectable 25 Gram(s) IV Push once  dextrose 50% Injectable 25 Gram(s) IV Push once  docusate sodium 100 milliGRAM(s) Oral three times a day  enoxaparin Injectable 30 milliGRAM(s) SubCutaneous every 12 hours  insulin lispro (HumaLOG) corrective regimen sliding scale   SubCutaneous three times a day before meals  lactated ringers. 1000 milliLiter(s) (100 mL/Hr) IV Continuous <Continuous>  loratadine 10 milliGRAM(s) Oral daily  losartan 100 milliGRAM(s) Oral daily  oxyCODONE  ER Tablet 10 milliGRAM(s) Oral every 12 hours  polyethylene glycol 3350 17 Gram(s) Oral daily  sodium chloride 0.9% lock flush 3 milliLiter(s) IV Push every 8 hours  theophylline SR Tablet 300 milliGRAM(s) Oral every 12 hours    MEDICATIONS  (PRN):  acetaminophen   Tablet 650 milliGRAM(s) Oral every 6 hours PRN For Temp over 38.3 C (100.94 F)  ALBUTerol    90 MICROgram(s) HFA Inhaler 2 Puff(s) Inhalation every 6 hours PRN for shortness of breath and/or wheezing  aluminum hydroxide/magnesium hydroxide/simethicone Suspension 30 milliLiter(s) Oral four times a day PRN Indigestion  dextrose Gel 1 Dose(s) Oral once PRN Blood Glucose LESS THAN 70 milliGRAM(s)/deciliter  glucagon  Injectable 1 milliGRAM(s) IntraMuscular once PRN Glucose LESS THAN 70 milligrams/deciliter  HYDROmorphone  Injectable 0.5 milliGRAM(s) IV Push every 4 hours PRN breakthrough  magnesium hydroxide Suspension 30 milliLiter(s) Oral daily PRN Constipation  meclizine 12.5 milliGRAM(s) Oral three times a day PRN Dizziness  ondansetron Injectable 4 milliGRAM(s) IV Push every 6 hours PRN Nausea and/or Vomiting  oxyCODONE    IR 5 milliGRAM(s) Oral every 3 hours PRN Mild Pain  oxyCODONE    IR 10 milliGRAM(s) Oral every 3 hours PRN Moderate Pain  senna 2 Tablet(s) Oral at bedtime PRN Constipation      Vital Signs Last 24 Hrs  T(C): 37 (07 Oct 2017 04:40), Max: 37 (06 Oct 2017 23:25)  T(F): 98.6 (07 Oct 2017 04:40), Max: 98.6 (06 Oct 2017 23:25)  HR: 59 (07 Oct 2017 04:40) (43 - 74)  BP: 113/67 (07 Oct 2017 04:40) (112/59 - 146/58)  BP(mean): --  RR: 18 (07 Oct 2017 04:40) (12 - 18)  SpO2: 96% (07 Oct 2017 04:40) (96% - 100%)    PHYSICAL EXAM:    GENERAL: NAD, well-groomed, well-developed  HEAD:  Atraumatic, Normocephalic  EYES: EOMI, PERRLA, conjunctiva and sclera clear  NECK: Supple, No JVD, Normal thyroid  NERVOUS SYSTEM:  Alert & Oriented X3, Good concentration; Motor Strength 5/5 B/L upper and lower extremities; DTRs 2+ intact and symmetric  CHEST/LUNG: CTA  b/l,  no rales, rhonchi, wheezing, or rubs  HEART: Regular rate and rhythm; No murmurs, rubs, or gallops  ABDOMEN: Soft, Nontender, Nondistended; Bowel sounds present  EXTREMITIES:  2+ Peripheral Pulses, No clubbing, cyanosis, or edema , L knee dressing + , clean and dry   LYMPH: No lymphadenopathy noted  SKIN: No rashes or lesions    LABS:                        12.0   11.0  )-----------( 267      ( 07 Oct 2017 05:13 )             36.2     10-07    140  |  104  |  11.0  ----------------------------<  108  3.8   |  26.0  |  0.46<L>    Ca    8.4<L>      07 Oct 2017 05:14              RADIOLOGY & ADDITIONAL STUDIES: PMD : Pierce       Patient is a 77y old  Female who presents with a chief complaint of Left knee pain . Pain present for many years , had multiple cortisone inj / physical therapy , was taking Tylenol / Pain was getting worst . Now s/p L TKA , POD # 1    CC: L knee pain , this am pain well controlled , no other complaints   HPI:  77F PMH HTN, Asthma, DM, Colon Polyps, Neuropathy, Fibromyalgia, GERD, Glaucoma , chronic  Anemia ,  left knee osteoarthritis , now s/p L TKA , POD # 1    PAST MEDICAL & SURGICAL HISTORY:  Neuropathy  Fibromyalgia  GERD (gastroesophageal reflux disease)  Glaucoma  Asthma  Colon polyps  Anemia  Hypertension  Osteoarthritis  Diabetes mellitus - type 2   History of bilateral cataract extraction  S/P tonsillectomy and adenoidectomy  History of hysterectomy     Social History:  · Marital Status	  · Occupation	Retired Food Industry  · Lives With	spouse     Substance Use History:  · Substance Use	caffeine  · Caffeine Type	coffee  · Caffeine Amount/Frequency	1-2 cups/cans per day     Alcohol Use History:  · Have you ever consumed alcohol	never     Tobacco Usage:  · Tobacco Usage: Former smoker  · Tobacco Type: cigarettes  · Number of Packs per Day: 1  · Number of yrs: 46  · Pack yrs: 46  · Longest Period Tobacco-Free: Quit 2005    FAMILY HISTORY:  Family history of cerebral palsy (Child)  Family history of AIDS (Sibling)  Family history of dementia (Father)  Family history of prostate cancer (Father)  Family history of bone cancer (Mother)      Allergies    morphine (Unknown)    Intolerances        HOME MEDICATIONS :     · 	meclizine 12.5 mg oral tablet: Last Dose Taken:  , 1 tab(s) orally once a day, As Needed  · 	losartan 100 mg oral tablet: Last Dose Taken:  , 1 tab(s) orally once a day  · 	theophylline 300 mg oral tablet, extended release: Last Dose Taken:  , 1 tab(s) orally every 12 hours  · 	Advair Diskus 250 mcg-50 mcg inhalation powder: Last Dose Taken:  , 1 puff(s) inhaled 2 times a day, As Needed - for shortness of breath and/or wheezing  · 	Proventil HFA 90 mcg/inh inhalation aerosol: Last Dose Taken:  , 2 puff(s) inhaled 4 times a day, As Needed - for shortness of breath and/or wheezing  · 	fexofenadine 180 mg oral tablet: Last Dose Taken:  , 1 tab(s) orally once a day, As Needed - for allergy symptoms  · 	azelastine 137 mcg/inh (0.1%) nasal spray: Last Dose Taken:  , 2 spray(s) nasal 2 times a day, As Needed  · 	Centrum Silver oral tablet: Last Dose Taken:  , 1 tab(s) orally once a day  · 	Vitamin B-12 1000 mcg oral tablet: Last Dose Taken:  , 1 tab(s) orally once a day  · 	Calcium 600+D oral tablet: Last Dose Taken:  , 1 tab(s) orally once a day  · 	Citrucel 2 g/19 g oral powder for reconstitution: Last Dose Taken:  , 1 dose(s) orally once a day  · 	glucosamine: Last Dose Taken:  , 2000 milligram(s) orally once a day  · 	gabapentin 300 mg oral capsule: Last Dose Taken:  , 1 cap(s) orally once a day (at bedtime)  · 	metFORMIN 500 mg oral tablet: Last Dose Taken:  , 1 tab(s) orally once a day (at bedtime)  · 	Tylenol 500 mg oral tablet: 2 tab(s) orally every 6 hours, As Needed    REVIEW OF SYSTEMS:    CONSTITUTIONAL: No fever, weight loss, or fatigue  EYES: No eye pain, visual disturbances, or discharge  NECK: No pain or stiffness  RESPIRATORY: No cough, wheezing, chills or hemoptysis; No shortness of breath  CARDIOVASCULAR: No chest pain, palpitations, dizziness, or leg swelling  GASTROINTESTINAL: No abdominal or epigastric pain. No nausea, vomiting, or hematemesis; No diarrhea or constipation. No melena or hematochezia.  GENITOURINARY: No dysuria, frequency, hematuria, or incontinence  NEUROLOGICAL: No headaches, memory loss, loss of strength, numbness, or tremors  SKIN: No itching, burning, rashes, or lesions   LYMPH NODES: No enlarged glands  ENDOCRINE: No heat or cold intolerance; No hair loss  MUSCULOSKELETAL: L knee pain , this am pain well controlled   PSYCHIATRIC: No depression, anxiety, mood swings, or difficulty sleeping  HEME/LYMPH: No easy bruising, or bleeding gums  ALLERGY AND IMMUNOLOGIC: No hives or eczema    MEDICATIONS  (STANDING):  acetaminophen   Tablet. 975 milliGRAM(s) Oral every 8 hours  buDESOnide 160 MICROgram(s)/formoterol 4.5 MICROgram(s) Inhaler 2 Puff(s) Inhalation two times a day  dextrose 5%. 1000 milliLiter(s) (50 mL/Hr) IV Continuous <Continuous>  dextrose 50% Injectable 12.5 Gram(s) IV Push once  dextrose 50% Injectable 25 Gram(s) IV Push once  dextrose 50% Injectable 25 Gram(s) IV Push once  docusate sodium 100 milliGRAM(s) Oral three times a day  enoxaparin Injectable 30 milliGRAM(s) SubCutaneous every 12 hours  insulin lispro (HumaLOG) corrective regimen sliding scale   SubCutaneous three times a day before meals  lactated ringers. 1000 milliLiter(s) (100 mL/Hr) IV Continuous <Continuous>  loratadine 10 milliGRAM(s) Oral daily  losartan 100 milliGRAM(s) Oral daily  oxyCODONE  ER Tablet 10 milliGRAM(s) Oral every 12 hours  polyethylene glycol 3350 17 Gram(s) Oral daily  sodium chloride 0.9% lock flush 3 milliLiter(s) IV Push every 8 hours  theophylline SR Tablet 300 milliGRAM(s) Oral every 12 hours    MEDICATIONS  (PRN):  acetaminophen   Tablet 650 milliGRAM(s) Oral every 6 hours PRN For Temp over 38.3 C (100.94 F)  ALBUTerol    90 MICROgram(s) HFA Inhaler 2 Puff(s) Inhalation every 6 hours PRN for shortness of breath and/or wheezing  aluminum hydroxide/magnesium hydroxide/simethicone Suspension 30 milliLiter(s) Oral four times a day PRN Indigestion  dextrose Gel 1 Dose(s) Oral once PRN Blood Glucose LESS THAN 70 milliGRAM(s)/deciliter  glucagon  Injectable 1 milliGRAM(s) IntraMuscular once PRN Glucose LESS THAN 70 milligrams/deciliter  HYDROmorphone  Injectable 0.5 milliGRAM(s) IV Push every 4 hours PRN breakthrough  magnesium hydroxide Suspension 30 milliLiter(s) Oral daily PRN Constipation  meclizine 12.5 milliGRAM(s) Oral three times a day PRN Dizziness  ondansetron Injectable 4 milliGRAM(s) IV Push every 6 hours PRN Nausea and/or Vomiting  oxyCODONE    IR 5 milliGRAM(s) Oral every 3 hours PRN Mild Pain  oxyCODONE    IR 10 milliGRAM(s) Oral every 3 hours PRN Moderate Pain  senna 2 Tablet(s) Oral at bedtime PRN Constipation      Vital Signs Last 24 Hrs  T(C): 37 (07 Oct 2017 04:40), Max: 37 (06 Oct 2017 23:25)  T(F): 98.6 (07 Oct 2017 04:40), Max: 98.6 (06 Oct 2017 23:25)  HR: 59 (07 Oct 2017 04:40) (43 - 74)  BP: 113/67 (07 Oct 2017 04:40) (112/59 - 146/58)  BP(mean): --  RR: 18 (07 Oct 2017 04:40) (12 - 18)  SpO2: 96% (07 Oct 2017 04:40) (96% - 100%)    PHYSICAL EXAM:    GENERAL: NAD, well-groomed, well-developed  HEAD:  Atraumatic, Normocephalic  EYES: EOMI, PERRLA, conjunctiva and sclera clear  NECK: Supple, No JVD, Normal thyroid  NERVOUS SYSTEM:  Alert & Oriented X3, Good concentration; Motor Strength 5/5 B/L upper and lower extremities; DTRs 2+ intact and symmetric  CHEST/LUNG: CTA  b/l,  no rales, rhonchi, wheezing, or rubs  HEART: Regular rate and rhythm; No murmurs, rubs, or gallops  ABDOMEN: Soft, Nontender, Nondistended; Bowel sounds present  EXTREMITIES:  2+ Peripheral Pulses, No clubbing, cyanosis, or edema , L knee dressing + , clean and dry   LYMPH: No lymphadenopathy noted  SKIN: No rashes or lesions    LABS:                        12.0   11.0  )-----------( 267      ( 07 Oct 2017 05:13 )             36.2     10-07    140  |  104  |  11.0  ----------------------------<  108  3.8   |  26.0  |  0.46<L>    Ca    8.4<L>      07 Oct 2017 05:14              RADIOLOGY & ADDITIONAL STUDIES: Detail Level: Generalized Price (Do Not Change): 0.00 Instructions: This plan will send the code FBSE to the PM system.  DO NOT or CHANGE the price.

## 2023-10-19 ENCOUNTER — APPOINTMENT (OUTPATIENT)
Dept: NEUROLOGY | Facility: CLINIC | Age: 83
End: 2023-10-19
Payer: MEDICARE

## 2023-10-19 VITALS
HEIGHT: 61 IN | DIASTOLIC BLOOD PRESSURE: 80 MMHG | WEIGHT: 160 LBS | BODY MASS INDEX: 30.21 KG/M2 | SYSTOLIC BLOOD PRESSURE: 130 MMHG

## 2023-10-19 PROCEDURE — 99214 OFFICE O/P EST MOD 30 MIN: CPT

## 2023-10-19 RX ORDER — AMOXICILLIN 500 MG/1
500 CAPSULE ORAL
Qty: 36 | Refills: 0 | Status: COMPLETED | COMMUNITY
Start: 2018-08-09 | End: 2023-10-19

## 2023-10-19 RX ORDER — METFORMIN ER 500 MG 500 MG/1
500 TABLET ORAL
Qty: 90 | Refills: 0 | Status: COMPLETED | COMMUNITY
Start: 2018-10-15 | End: 2023-10-19

## 2023-10-23 ENCOUNTER — RX RENEWAL (OUTPATIENT)
Age: 83
End: 2023-10-23

## 2023-12-27 ENCOUNTER — RX RENEWAL (OUTPATIENT)
Age: 83
End: 2023-12-27

## 2024-02-20 ENCOUNTER — RX RENEWAL (OUTPATIENT)
Age: 84
End: 2024-02-20

## 2024-04-15 ENCOUNTER — RX RENEWAL (OUTPATIENT)
Age: 84
End: 2024-04-15

## 2024-04-15 RX ORDER — GABAPENTIN 300 MG/1
300 CAPSULE ORAL TWICE DAILY
Qty: 60 | Refills: 0 | Status: ACTIVE | COMMUNITY
Start: 2022-11-17 | End: 1900-01-01

## 2024-04-23 ENCOUNTER — APPOINTMENT (OUTPATIENT)
Dept: NEUROLOGY | Facility: CLINIC | Age: 84
End: 2024-04-23
Payer: MEDICARE

## 2024-04-23 VITALS
SYSTOLIC BLOOD PRESSURE: 180 MMHG | HEIGHT: 61 IN | BODY MASS INDEX: 30.21 KG/M2 | WEIGHT: 160 LBS | DIASTOLIC BLOOD PRESSURE: 86 MMHG

## 2024-04-23 DIAGNOSIS — G50.0 TRIGEMINAL NEURALGIA: ICD-10-CM

## 2024-04-23 PROCEDURE — G2211 COMPLEX E/M VISIT ADD ON: CPT

## 2024-04-23 PROCEDURE — 99214 OFFICE O/P EST MOD 30 MIN: CPT

## 2024-04-23 RX ORDER — GABAPENTIN 300 MG/1
300 CAPSULE ORAL TWICE DAILY
Qty: 60 | Refills: 6 | Status: ACTIVE | COMMUNITY
Start: 1900-01-01 | End: 1900-01-01

## 2024-04-23 RX ORDER — OXCARBAZEPINE 300 MG/1
300 TABLET, FILM COATED ORAL
Qty: 120 | Refills: 6 | Status: ACTIVE | COMMUNITY
Start: 2020-12-21 | End: 1900-01-01

## 2024-04-23 NOTE — ASSESSMENT
[FreeTextEntry1] : Trigeminal neuralgia  Now doing well on Trileptal 300 mg 4 times a day and gabapentin 300 mg twice a day  Medications to remain the same for now  Follow-up in 6 months and by phone prior to that as needed.  Blood pressure noted to be high today.  She says this is unusual for her. Advised her to keep an eye on it and follow-up with her primary care doctor.

## 2024-04-23 NOTE — PHYSICAL EXAM
[FreeTextEntry1] : No palpation tenderness over the face. [General Appearance - Alert] : alert [General Appearance - In No Acute Distress] : in no acute distress [General Appearance - Well-Appearing] : healthy appearing [Oriented To Time, Place, And Person] : oriented to person, place, and time [Impaired Insight] : insight and judgment were intact [Affect] : the affect was normal [Memory Recent] : recent memory was not impaired [Over the Past 2 Weeks, Have You Felt Down, Depressed, or Hopeless?] : 1.) Over the past 2 weeks, have you felt down, depressed, or hopeless? No [Over the Past 2 Weeks, Have You Felt Little Interest or Pleasure Doing Things?] : 2.) Over the past 2 weeks, have you felt little interest or pleasure doing things? No [Person] : oriented to person [Place] : oriented to place [Time] : oriented to time [Concentration Intact] : normal concentrating ability [Naming Objects] : no difficulty naming common objects [Fluency] : fluency intact [Comprehension] : comprehension intact [Cranial Nerves Optic (II)] : visual acuity intact bilaterally,  visual fields full to confrontation, pupils equal round and reactive to light [Cranial Nerves Oculomotor (III)] : extraocular motion intact [Cranial Nerves Trigeminal (V)] : facial sensation intact symmetrically [Cranial Nerves Facial (VII)] : face symmetrical [Cranial Nerves Vestibulocochlear (VIII)] : hearing was intact bilaterally [Cranial Nerves Glossopharyngeal (IX)] : tongue and palate midline [Cranial Nerves Accessory (XI - Cranial And Spinal)] : head turning and shoulder shrug symmetric [Cranial Nerves Hypoglossal (XII)] : there was no tongue deviation with protrusion [Motor Tone] : muscle tone was normal in all four extremities [Motor Strength] : muscle strength was normal in all four extremities [No Muscle Atrophy] : normal bulk in all four extremities [Paresis Pronator Drift Right-Sided] : no pronator drift on the right [Paresis Pronator Drift Left-Sided] : no pronator drift on the left [Sensation Tactile Decrease] : light touch was intact [Sensation Pain / Temperature Decrease] : pain and temperature was intact [Balance] : balance was intact [Past-pointing] : there was no past-pointing [Tremor] : no tremor present [2+] : Ankle jerk left 2+ [Plantar Reflex Right Only] : normal on the right [Plantar Reflex Left Only] : normal on the left [FreeTextEntry8] : Ambulates with a cane due to the hip and knee problems. [PERRL With Normal Accommodation] : pupils were equal in size, round, reactive to light, with normal accommodation [Extraocular Movements] : extraocular movements were intact [Full Visual Field] : full visual field

## 2024-04-23 NOTE — CONSULT LETTER
[Dear  ___] : Dear  [unfilled], [Consult Letter:] : I had the pleasure of evaluating your patient, [unfilled]. [Please see my note below.] : Please see my note below. [Consult Closing:] : Thank you very much for allowing me to participate in the care of this patient.  If you have any questions, please do not hesitate to contact me. [Sincerely,] : Sincerely, [FreeTextEntry3] : Cooper Ayala MD, PhD, DPN-N\par  [facility]\par  Neurology at [place]\par  Chief, Division of Neurology\par  [facility]\par

## 2024-07-05 ENCOUNTER — RX RENEWAL (OUTPATIENT)
Age: 84
End: 2024-07-05

## 2024-10-24 ENCOUNTER — APPOINTMENT (OUTPATIENT)
Dept: NEUROLOGY | Facility: CLINIC | Age: 84
End: 2024-10-24
Payer: MEDICARE

## 2024-10-24 DIAGNOSIS — G50.0 TRIGEMINAL NEURALGIA: ICD-10-CM

## 2024-10-24 PROCEDURE — G2211 COMPLEX E/M VISIT ADD ON: CPT

## 2024-10-24 PROCEDURE — 99214 OFFICE O/P EST MOD 30 MIN: CPT

## 2024-11-11 ENCOUNTER — RX RENEWAL (OUTPATIENT)
Age: 84
End: 2024-11-11

## 2025-03-04 ENCOUNTER — RX RENEWAL (OUTPATIENT)
Age: 85
End: 2025-03-04

## 2025-04-24 ENCOUNTER — APPOINTMENT (OUTPATIENT)
Dept: NEUROLOGY | Facility: CLINIC | Age: 85
End: 2025-04-24
Payer: MEDICARE

## 2025-04-24 VITALS
HEIGHT: 61 IN | WEIGHT: 160 LBS | DIASTOLIC BLOOD PRESSURE: 85 MMHG | SYSTOLIC BLOOD PRESSURE: 183 MMHG | BODY MASS INDEX: 30.21 KG/M2

## 2025-04-24 DIAGNOSIS — G50.0 TRIGEMINAL NEURALGIA: ICD-10-CM

## 2025-04-24 PROCEDURE — G2211 COMPLEX E/M VISIT ADD ON: CPT

## 2025-04-24 PROCEDURE — 99214 OFFICE O/P EST MOD 30 MIN: CPT

## 2025-07-24 ENCOUNTER — RX RENEWAL (OUTPATIENT)
Age: 85
End: 2025-07-24